# Patient Record
Sex: MALE | Race: WHITE | NOT HISPANIC OR LATINO | Employment: FULL TIME | URBAN - METROPOLITAN AREA
[De-identification: names, ages, dates, MRNs, and addresses within clinical notes are randomized per-mention and may not be internally consistent; named-entity substitution may affect disease eponyms.]

---

## 2017-01-13 ENCOUNTER — TELEPHONE (OUTPATIENT)
Dept: FAMILY MEDICINE | Facility: CLINIC | Age: 52
End: 2017-01-13

## 2017-01-13 DIAGNOSIS — R19.7 DIARRHEA, UNSPECIFIED TYPE: Primary | ICD-10-CM

## 2017-01-13 NOTE — TELEPHONE ENCOUNTER
Let's check on Monday, if the stool remains loose, then repeat test is needed.  Yasmin Rodríguez MD  [facility]  [phone]  r

## 2017-01-13 NOTE — TELEPHONE ENCOUNTER
PT calls re 12/29/16 visit for c-diff   Took last dose vanco one week ago. I do not have anywhere near pain I had in hospital but stools are still soft, is this something to be concerned about?    3 loose stools today but admits to overeating last night.  General questions about how long before stool returns to baseline?   Florentin Gardiner, RN

## 2017-01-16 ENCOUNTER — CARE COORDINATION (OUTPATIENT)
Dept: CARE COORDINATION | Facility: CLINIC | Age: 52
End: 2017-01-16

## 2017-01-16 NOTE — Clinical Note
[place] CARE COORDINATION  [address] [address]  [phone]    January 20, 2017      Jung Severino  [address] [facility] [address]    Dear Jung,  I am the Clinic Care Coordinator that works with your primary care provider's clinic. I wanted to introduce myself and provide you with my contact information for you to be able to call me with any questions or concerns. I have been trying to reach you recently to introduce Clinic Care Coordination and to see if there was anything I could assist you with.  I recently tried to call and was unable to reach you. Below is a description of what Clinic Care Coordination is and how I can further assist you.     The Clinic Care Coordinator role is a Registered Nurse and/or  who understands the health care system. The goal of Clinic Care Coordination is to help you manage your health and improve access to the [facility] system in the most efficient manner.  The Registered Nurse can assist you in meeting your health care goals by providing education, coordinating services, and strengthening the communication among your providers. The  can assist you with financial, behavioral, psychosocial, and chemical dependency and counseling/psychiatric resources.    Please feel free to keep this letter and contact information to contact me at [phone] with any further questions or concerns that may arise. We at [facility] are focused on providing you with the highest-quality healthcare experience possible and that all starts with you.     Sincerely,     Tracy Altamirano Care Coordinator RN  [facility]  [phone]    Enclosed: I have enclosed a copy of a 24 Hour Access Plan. This has helpful phone numbers for you to call when needed. Please keep this in an easy to access place to use as needed.

## 2017-01-16 NOTE — PROGRESS NOTES
Clinic Care Coordination Contact  [facility]/Voicemail    Referral Source: Care Team (triage RN )  Clinical Data: Care Coordinator Outreach C-diff / alcohol withdrawal   Outreach attempted x 1.  Left message on voicemail with call back information and requested return call.  Plan:  Care Coordinator will try to reach patient again in 1-2 business days.    Tracy Altamirano Care Coordinator RN  [facility]  [phone]

## 2017-01-16 NOTE — TELEPHONE ENCOUNTER
No problem, we can do lab again, but I will give it until Friday.  Yasmin Rodríguez MD  [facility]  [phone]

## 2017-01-16 NOTE — TELEPHONE ENCOUNTER
Pt reports.  For the first time this morning, semi-formed. They were pretty loose over the weekend.   If no improvement or stools loosen up pt prefers to call back tomorrow for lab orders/schedule LO appointment.    Dr. Rodríguez, OK for nurse to place lab order (same as 12/20/16) for c Diff Toxin B PCR  if needed?  Florentin Gardiner, RN

## 2017-01-16 NOTE — Clinical Note
Health Care Home - Access Care Plan    About Me  Patient Name:  Jung Severino    YOB: 1965  Age:                            51 year old   [place] MRN:         [id number] Telephone Information:     Home Phone [phone]   Mobile [phone]   Home Phone [phone]       Address:    [address] [place] [address] Email address:  [email or web address]      Emergency Contact(s)  Name Relationship Lgl Grd Work Phone Home Phone Mobile Phone   1. CAROLYN SEVERINO Spouse  [phone]    2. TERRY SEVERINO Daughter   [phone]              Health Maintenance:      My Access Plan  Medical Emergency 911   Questions or concerns during clinic hours Primary Clinic Line, I will call the clinic directly: Primary Clinic: [facility]- [phone]   24 Hour Appointment Line [phone] or  [phone])  (toll free)   24 Hour Nurse Line [phone] (toll free)   Questions or concerns outside clinic hours 24 Hour Appointment Line, I will call the after-hours on-call line:   [facility] [phone] or [phone] ([phone]) (toll-free)   Preferred Urgent Care Preferred Urgent Care: [facility], [phone]   Preferred Hospital Preferred Hospital: [facility]  [phone]   Preferred Pharmacy [facility] [place] [id number] [place] [address] [address]     Behavioral Health Crisis Line [facility], [phone] or 911     My Care Team Members     Relationship Specialty Notifications Yasmin Evans MD PCP - General Family Practice  3/14/16    Phone: [phone] Fax: [phone]           My Medical and Care Information  Problem List   Patient Active Problem List   Diagnosis     Family history of other cardiovascular diseases     Closed fracture of head of radius     Contusion of face, scalp, and neck except eye(s)     Injury, other and  unspecified, hand, except finger     Malaise and fatigue     Anxiety     Mild major depression (H)     CARDIOVASCULAR SCREENING; LDL GOAL LESS THAN 130     Alcohol withdrawal syndrome (H)     C. difficile colitis     Alcohol abuse        Current Medications and Allergies:  See printed Medication Report

## 2017-01-18 NOTE — PROGRESS NOTES
Clinic Care Coordination Contact  [facility]/Voicemail    Referral Source: Care Team (triage RN)  Clinical Data: Care Coordinator Outreach  Outreach attempted x 2.  Left message on voicemail with call back information and requested return call.  Plan: Care Coordinator will f/u in 2 business days with [facility] letter.    Tracy Altamirano Care Coordinator RN  [facility]  [phone]

## 2018-01-05 ENCOUNTER — TELEPHONE (OUTPATIENT)
Dept: FAMILY MEDICINE | Facility: CLINIC | Age: 53
End: 2018-01-05

## 2018-01-05 NOTE — TELEPHONE ENCOUNTER
1/5/2018    Call Regarding Preventive Health Screening Colonoscopy    Attempt 1    Message on voicemail     Comments:       Outreach   SB

## 2018-01-13 NOTE — TELEPHONE ENCOUNTER
1/13/2018    Call Regarding Preventive Health Screening Colonoscopy    Attempt 2    Message on voicemail     Comments:       Outreach   SB

## 2018-01-19 DIAGNOSIS — F51.01 PRIMARY INSOMNIA: ICD-10-CM

## 2018-01-19 NOTE — LETTER
January 23, 2018      Jung Severino  [address] [facility] [address]        Dear ,    Your provider sent a 15 day refill of trazodone to your pharmacy yesterday. This is a reminder from Dr. Rodríguez to schedule an office visit. No further refills will be approved until your next appointment.    Sincerely,    FRANCIA Lerma for Yasmin Rodríguez MD

## 2018-01-19 NOTE — TELEPHONE ENCOUNTER
"Requested Prescriptions   Pending Prescriptions Disp Refills     traZODone (DESYREL) 100 MG tablet [Pharmacy Med Name: TRAZODONE 100MG TABLETS] 90 tablet 0     Sig: TAKE 1 TABLET(100 MG) BY MOUTH EVERY NIGHT AS NEEDED FOR SLEEP    Serotonin Modulators Failed    1/19/2018 11:34 AM       Failed - Recent or future visit with authorizing provider's specialty    Patient had office visit in the last year or has a visit in the next 30 days with authorizing provider.  See \"Patient Info\" tab in inbasket, or \"Choose Columns\" in Meds & Orders section of the refill encounter.            Passed - Patient is age 18 or older        Last Written Prescription Date:  12/19/16  Last Fill Quantity: 90,  # refills: 3   Last Office Visit with G, P or [facility] prescribing provider:  12/29/16 w/Dr Rodríguez   Future Office Visit:       "

## 2018-01-23 RX ORDER — TRAZODONE HYDROCHLORIDE 100 MG/1
TABLET ORAL
Qty: 15 TABLET | Refills: 0 | Status: SHIPPED | OUTPATIENT
Start: 2018-01-23 | End: 2018-03-05

## 2018-02-16 DIAGNOSIS — F51.01 PRIMARY INSOMNIA: ICD-10-CM

## 2018-02-16 NOTE — TELEPHONE ENCOUNTER
2/16/2018    Call Regarding Preventive Health Screening Colonoscopy     Attempt 3     Message on voicemail      Comments:         Outreach ,  Wanda Herrera

## 2018-02-16 NOTE — TELEPHONE ENCOUNTER
"Requested Prescriptions   Pending Prescriptions Disp Refills     traZODone (DESYREL) 100 MG tablet [Pharmacy Med Name: TRAZODONE 100MG TABLETS]  Over a year since last office visit  Last Written Prescription Date:  1/23/2018  Last Fill Quantity: 15 tablet,  # refills: 0   Last office visit: 12/29/2016 with prescribing provider:  Marcos    15 tablet 0     Sig: TAKE 1 TABLET(100 MG) BY MOUTH EVERY NIGHT AS NEEDED FOR SLEEP    Serotonin Modulators Failed    2/16/2018 12:48 PM       Failed - Recent or future visit with authorizing provider's specialty    Patient had office visit in the last year or has a visit in the next 30 days with authorizing provider.  See \"Patient Info\" tab in inbasket, or \"Choose Columns\" in Meds & Orders section of the refill encounter.            Passed - Patient is age 18 or older             "

## 2018-02-19 RX ORDER — TRAZODONE HYDROCHLORIDE 100 MG/1
TABLET ORAL
Refills: 0
Start: 2018-02-19

## 2018-02-19 NOTE — TELEPHONE ENCOUNTER
Denied. No future appointment.    Per 1/19/18 refill encounter Dr. Rodríguez noted:        Given 15 days supply. Last refill        1/23/18 Letter was mailed.  Florentin Gardiner RN

## 2018-02-28 DIAGNOSIS — F51.01 PRIMARY INSOMNIA: ICD-10-CM

## 2018-02-28 DIAGNOSIS — L30.8 OTHER ECZEMA: ICD-10-CM

## 2018-03-01 NOTE — TELEPHONE ENCOUNTER
"Requested Prescriptions   Pending Prescriptions Disp Refills     triamcinolone (KENALOG) 0.1 % cream [Pharmacy Med Name: TRIAMCINOLONE 0.1% CREAM 80GM]  Last Written Prescription Date:  12/29/16  Last Fill Quantity: 85.2 G,  # refills: 3   Last office visit: 12/29/16 with prescribing provider:  JACKIE   Future Office Visit:     80 g 0     Sig: APPLY EXTERNALLY TO THE AFFECTED AREA TWICE DAILY    Topical Steroid Protocol Failed    2/28/2018 12:31 PM       Failed - Recent or future visit with authorizing provider's specialty    Patient had office visit in the last year or has a visit in the next 30 days with authorizing provider.  See \"Patient Info\" tab in inbasket, or \"Choose Columns\" in Meds & Orders section of the refill encounter.            Passed - Patient is age 6 or older       Passed - Authorizing prescriber's most recent note related to this medication read.       Passed - High potency steroid not ordered        traZODone (DESYREL) 100 MG tablet [Pharmacy Med Name: TRAZODONE 100MG TABLETS]  Last Written Prescription Date:  1/23/18  Last Fill Quantity: 15 TABLET,  # refills: 0   Last office visit: 12/29/16 with prescribing provider:  JACKIE   Future Office Visit:     15 tablet 0     Sig: TAKE 1 TABLET(100 MG) BY MOUTH EVERY NIGHT AS NEEDED FOR SLEEP    Serotonin Modulators Failed    2/28/2018 12:31 PM       Failed - Recent or future visit with authorizing provider's specialty    Patient had office visit in the last year or has a visit in the next 30 days with authorizing provider.  See \"Patient Info\" tab in inbasket, or \"Choose Columns\" in Meds & Orders section of the refill encounter.            Passed - Patient is age 18 or older          "

## 2018-03-02 RX ORDER — TRIAMCINOLONE ACETONIDE 1 MG/G
CREAM TOPICAL
Refills: 0
Start: 2018-03-02

## 2018-03-02 RX ORDER — TRAZODONE HYDROCHLORIDE 100 MG/1
TABLET ORAL
Refills: 0
Start: 2018-03-02

## 2018-03-02 NOTE — TELEPHONE ENCOUNTER
Denied.    Message handled by Nurse Triage with [facility] - provider name: Yasmin Rodríguez MD.  Florentin Gardiner RN

## 2018-03-05 ENCOUNTER — OFFICE VISIT (OUTPATIENT)
Dept: FAMILY MEDICINE | Facility: CLINIC | Age: 53
End: 2018-03-05
Payer: COMMERCIAL

## 2018-03-05 VITALS
HEIGHT: 68 IN | RESPIRATION RATE: 20 BRPM | OXYGEN SATURATION: 95 % | HEART RATE: 95 BPM | DIASTOLIC BLOOD PRESSURE: 129 MMHG | SYSTOLIC BLOOD PRESSURE: 214 MMHG | TEMPERATURE: 98.8 F | WEIGHT: 212 LBS | BODY MASS INDEX: 32.13 KG/M2

## 2018-03-05 DIAGNOSIS — I10 BENIGN ESSENTIAL HYPERTENSION: Primary | ICD-10-CM

## 2018-03-05 DIAGNOSIS — L30.8 OTHER ECZEMA: ICD-10-CM

## 2018-03-05 DIAGNOSIS — Z12.11 SCREEN FOR COLON CANCER: ICD-10-CM

## 2018-03-05 DIAGNOSIS — F51.01 PRIMARY INSOMNIA: ICD-10-CM

## 2018-03-05 LAB
ANION GAP SERPL CALCULATED.3IONS-SCNC: 4 MMOL/L (ref 3–14)
BUN SERPL-MCNC: 20 MG/DL (ref 7–30)
CALCIUM SERPL-MCNC: 9 MG/DL (ref 8.5–10.1)
CHLORIDE SERPL-SCNC: 103 MMOL/L (ref 94–109)
CHOLEST SERPL-MCNC: 223 MG/DL
CO2 SERPL-SCNC: 29 MMOL/L (ref 20–32)
CREAT SERPL-MCNC: 0.84 MG/DL (ref 0.66–1.25)
CREAT UR-MCNC: 118 MG/DL
ERYTHROCYTE [DISTWIDTH] IN BLOOD BY AUTOMATED COUNT: 11.9 % (ref 10–15)
GFR SERPL CREATININE-BSD FRML MDRD: >90 ML/MIN/1.7M2
GLUCOSE SERPL-MCNC: 110 MG/DL (ref 70–99)
HCT VFR BLD AUTO: 45.8 % (ref 40–53)
HDLC SERPL-MCNC: 63 MG/DL
HGB BLD-MCNC: 16.2 G/DL (ref 13.3–17.7)
LDLC SERPL CALC-MCNC: 121 MG/DL
MCH RBC QN AUTO: 34.5 PG (ref 26.5–33)
MCHC RBC AUTO-ENTMCNC: 35.4 G/DL (ref 31.5–36.5)
MCV RBC AUTO: 98 FL (ref 78–100)
MICROALBUMIN UR-MCNC: 170 MG/L
MICROALBUMIN/CREAT UR: 144.07 MG/G CR (ref 0–17)
NONHDLC SERPL-MCNC: 160 MG/DL
PLATELET # BLD AUTO: 173 10E9/L (ref 150–450)
POTASSIUM SERPL-SCNC: 3.9 MMOL/L (ref 3.4–5.3)
RBC # BLD AUTO: 4.69 10E12/L (ref 4.4–5.9)
SODIUM SERPL-SCNC: 136 MMOL/L (ref 133–144)
TRIGL SERPL-MCNC: 194 MG/DL
TSH SERPL DL<=0.005 MIU/L-ACNC: 1.83 MU/L (ref 0.4–4)
WBC # BLD AUTO: 5.3 10E9/L (ref 4–11)

## 2018-03-05 PROCEDURE — 80061 LIPID PANEL: CPT | Performed by: FAMILY MEDICINE

## 2018-03-05 PROCEDURE — 85027 COMPLETE CBC AUTOMATED: CPT | Performed by: FAMILY MEDICINE

## 2018-03-05 PROCEDURE — 36415 COLL VENOUS BLD VENIPUNCTURE: CPT | Performed by: FAMILY MEDICINE

## 2018-03-05 PROCEDURE — 82043 UR ALBUMIN QUANTITATIVE: CPT | Performed by: FAMILY MEDICINE

## 2018-03-05 PROCEDURE — 80048 BASIC METABOLIC PNL TOTAL CA: CPT | Performed by: FAMILY MEDICINE

## 2018-03-05 PROCEDURE — 84443 ASSAY THYROID STIM HORMONE: CPT | Performed by: FAMILY MEDICINE

## 2018-03-05 PROCEDURE — 99214 OFFICE O/P EST MOD 30 MIN: CPT | Performed by: FAMILY MEDICINE

## 2018-03-05 RX ORDER — TRIAMCINOLONE ACETONIDE 1 MG/G
CREAM TOPICAL 2 TIMES DAILY
Qty: 85.2 G | Refills: 3 | Status: SHIPPED | OUTPATIENT
Start: 2018-03-05 | End: 2019-03-04

## 2018-03-05 RX ORDER — TRAZODONE HYDROCHLORIDE 100 MG/1
100 TABLET ORAL AT BEDTIME
Qty: 90 TABLET | Refills: 3 | Status: SHIPPED | OUTPATIENT
Start: 2018-03-05 | End: 2019-03-04

## 2018-03-05 RX ORDER — HYDROCHLOROTHIAZIDE 25 MG/1
25 TABLET ORAL DAILY
Qty: 90 TABLET | Refills: 3 | Status: SHIPPED | OUTPATIENT
Start: 2018-03-05 | End: 2020-03-26

## 2018-03-05 ASSESSMENT — PATIENT HEALTH QUESTIONNAIRE - PHQ9: 5. POOR APPETITE OR OVEREATING: SEVERAL DAYS

## 2018-03-05 ASSESSMENT — ANXIETY QUESTIONNAIRES
GAD7 TOTAL SCORE: 1
3. WORRYING TOO MUCH ABOUT DIFFERENT THINGS: NOT AT ALL
5. BEING SO RESTLESS THAT IT IS HARD TO SIT STILL: NOT AT ALL
IF YOU CHECKED OFF ANY PROBLEMS ON THIS QUESTIONNAIRE, HOW DIFFICULT HAVE THESE PROBLEMS MADE IT FOR YOU TO DO YOUR WORK, TAKE CARE OF THINGS AT HOME, OR GET ALONG WITH OTHER PEOPLE: NOT DIFFICULT AT ALL
2. NOT BEING ABLE TO STOP OR CONTROL WORRYING: NOT AT ALL
1. FEELING NERVOUS, ANXIOUS, OR ON EDGE: NOT AT ALL
7. FEELING AFRAID AS IF SOMETHING AWFUL MIGHT HAPPEN: NOT AT ALL
6. BECOMING EASILY ANNOYED OR IRRITABLE: NOT AT ALL

## 2018-03-05 NOTE — MR AVS SNAPSHOT
After Visit Summary   3/5/2018    Jung Severino    MRN: [id number]           Patient Information     Date Of Birth          1965        Visit Information        Provider Department      3/5/2018 1:30 PM Yasmin Rodríguez MD [facility]        Today's Diagnoses     Benign essential hypertension    -  1    Screen for colon cancer        Primary insomnia        Other eczema           Follow-ups after your visit        Additional Services     GASTROENTEROLOGY ADULT REF PROCEDURE ONLY       Last Lab Result: Creatinine (mg/dL)       Date                     Value                 12/24/2016               0.98             ----------  There is no height or weight on file to calculate BMI.     Needed:  No  Language:  English    Patient will be contacted to schedule procedure.     Please be aware that coverage of these services is subject to the terms and limitations of your health insurance plan.  Call member services at your health plan with any benefit or coverage questions.  Any procedures must be performed at a [facility] facility OR coordinated by your clinic's referral office.    Please bring the following with you to your appointment:    (1) Any X-Rays, CTs or MRIs which have been performed.  Contact the facility where they were done to arrange for  prior to your scheduled appointment.    (2) List of current medications   (3) This referral request   (4) Any documents/labs given to you for this referral                  Your next 10 appointments already scheduled     Mar 06, 2018 11:00 AM CST   Nurse Only with SAVANNAH MATHEW MA/LPN   [facility]    [address] [place] [id number] [phone]              Who to contact     If you have questions or need follow up information about today's clinic visit or your schedule please contact [facility] directly at [phone].  Normal or  "non-critical lab and imaging results will be communicated to you by MyChart, letter or phone within 4 business days after the clinic has received the results. If you do not hear from us within 7 days, please contact the clinic through [employer] or phone. If you have a critical or abnormal lab result, we will notify you by phone as soon as possible.  Submit refill requests through [employer] or call your pharmacy and they will forward the refill request to us. Please allow 3 business days for your refill to be completed.          Additional Information About Your Visit        [employer] Information     [employer] gives you secure access to your electronic health record. If you see a primary care provider, you can also send messages to your care team and make appointments. If you have questions, please call your primary care clinic.  If you do not have a primary care provider, please call [phone] and they will assist you.        Care EveryWhere ID     This is your Care EveryWhere ID. This could be used by other organizations to access your [facility] medical records  [id number]        Your Vitals Were     Pulse Temperature Respirations Height Pulse Oximetry BMI (Body Mass Index)    95 98.8  F (37.1  C) (Oral) 20 5' 8\" (1.727 m) 95% 32.23 kg/m2       Blood Pressure from Last 3 Encounters:   03/05/18 (!) 214/129   12/29/16 129/88   12/24/16 130/84    Weight from Last 3 Encounters:   03/05/18 212 lb (96.2 kg)   12/29/16 183 lb (83 kg)   12/24/16 180 lb (81.6 kg)              We Performed the Following     Albumin Random Urine Quantitative with Creat Ratio     Basic metabolic panel     CBC with platelets     DEPRESSION ACTION PLAN (DAP)     GASTROENTEROLOGY ADULT REF PROCEDURE ONLY     Lipid panel reflex to direct LDL Non-fasting     TSH with free T4 reflex          Today's Medication Changes          These changes are accurate as of 3/5/18  2:29 PM.  If you have any questions, ask your nurse or doctor.               Start " taking these medicines.        Dose/Directions    hydrochlorothiazide 25 MG tablet   Commonly known as:  HYDRODIURIL   Used for:  Benign essential hypertension   Started by:  Yasmin Rodríguez MD        Dose:  25 mg   Take 1 tablet (25 mg) by mouth daily   Quantity:  90 tablet   Refills:  3         These medicines have changed or have updated prescriptions.        Dose/Directions    traZODone 100 MG tablet   Commonly known as:  DESYREL   This may have changed:  See the new instructions.   Used for:  Primary insomnia   Changed by:  Yasmin Rodríguez MD        Dose:  100 mg   Take 1 tablet (100 mg) by mouth At Bedtime   Quantity:  90 tablet   Refills:  3            Where to get your medicines      These medications were sent to [facility] [id number] - [facility] [address]  [address], [facility] [address]     Phone:  [phone]     hydrochlorothiazide 25 MG tablet    traZODone 100 MG tablet    triamcinolone 0.1 % cream                Primary Care Provider Office Phone # Fax #    Yasmin Rodríguez [phone] [address] [address] [facility] [address]        Equal Access to Services     [facility] AH: Hadii deidre ku hadasho Soomaali, waaxda luqadaha, qaybta kaalmada adeegyada, lay blank haytonon benton masterson . So [facility] [phone].    ATENCIÓN: Si habla español, tiene a swain disposición servicios gratuitos de asistencia lingüística. Llame al [phone].    We comply with applicable federal civil rights laws and [place] laws. We do not discriminate on the basis of race, color, national origin, age, disability, sex, sexual orientation, or gender identity.            Thank you!     Thank you for choosing [facility]  for your care. Our goal is always to provide you with excellent care. Hearing back from our patients is one way we can continue to improve our services. Please take a few minutes to complete the written survey that  you may receive in the mail after your visit with us. Thank you!             Your Updated Medication List - Protect others around you: Learn how to safely use, store and throw away your medicines at www.disposemymeds.org.          This list is accurate as of 3/5/18  2:29 PM.  Always use your most recent med list.                   Brand Name Dispense Instructions for use Diagnosis    ADVIL PO      Take 200 mg by mouth at onset of headache for moderate pain        hydrochlorothiazide 25 MG tablet    HYDRODIURIL    90 tablet    Take 1 tablet (25 mg) by mouth daily    Benign essential hypertension       multivitamin, therapeutic with minerals Tabs tablet     30 each    Take 1 tablet by mouth daily    Alcohol withdrawal, uncomplicated (H)       traZODone 100 MG tablet    DESYREL    90 tablet    Take 1 tablet (100 mg) by mouth At Bedtime    Primary insomnia       triamcinolone 0.1 % cream    KENALOG    85.2 g    Apply topically 2 times daily    Other eczema       ZANTAC PO      Take 150 mg by mouth every morning

## 2018-03-05 NOTE — PROGRESS NOTES
SUBJECTIVE:   Jung Severino is a 52 year old male who presents to clinic today for the following health issues:      Medication Followup of Trazodone  He is taking trazodone tu for insomnia.    Taking Medication as prescribed: yes    Side Effects:  None    Medication Helping Symptoms:  yes       Hypertension Follow-up      Outpatient blood pressures are not being checked.    Pt has been gaining weight as he has not been exercising, and he did have high blood pressure in the past when he was overweight.    Low Salt Diet: no added salt    He has been sick with URI symptoms., and he has been taking sudafed.      Problem list and histories reviewed & adjusted, as indicated.  Additional history: as documented    Patient Active Problem List   Diagnosis     Family history of other cardiovascular diseases     Closed fracture of head of radius     Contusion of face, scalp, and neck except eye(s)     Injury, other and unspecified, hand, except finger     Malaise and fatigue     Anxiety     Mild major depression (H)     CARDIOVASCULAR SCREENING; LDL GOAL LESS THAN 130     Alcohol withdrawal syndrome (H)     C. difficile colitis     Alcohol abuse     Benign essential hypertension     Past Surgical History:   Procedure Laterality Date     NOT IN USE         Social History   Substance Use Topics     Smoking status: Never Smoker     Smokeless tobacco: Current User     Types: Chew      Comment: chewing tobacco     Alcohol use No     Family History   Problem Relation Age of Onset     HEART DISEASE Father      CANCER No family hx of      DIABETES No family hx of          Current Outpatient Prescriptions   Medication Sig Dispense Refill     traZODone (DESYREL) 100 MG tablet Take 1 tablet (100 mg) by mouth At Bedtime 90 tablet 3     triamcinolone (KENALOG) 0.1 % cream Apply topically 2 times daily 85.2 g 3     hydrochlorothiazide (HYDRODIURIL) 25 MG tablet Take 1 tablet (25 mg) by mouth daily 90 tablet 3     multivitamin,  "therapeutic with minerals (THERA-VIT-M) TABS tablet Take 1 tablet by mouth daily 30 each 0     RaNITidine HCl (ZANTAC PO) Take 150 mg by mouth every morning        Ibuprofen (ADVIL PO) Take 200 mg by mouth at onset of headache for moderate pain       [DISCONTINUED] traZODone (DESYREL) 100 MG tablet TAKE 1 TABLET(100 MG) BY MOUTH EVERY NIGHT AS NEEDED FOR SLEEP 15 tablet 0     Allergies   Allergen Reactions     Sulfa Drugs      Tetanus [Tetanus Toxoids] Nausea and Vomiting     High Fever x4 days       Reviewed and updated as needed this visit by clinical staff  Tobacco  Allergies  Meds  Med Hx  Surg Hx  Fam Hx  Soc Hx      Reviewed and updated as needed this visit by Provider         ROS:  CONSTITUTIONAL: NEGATIVE for fever, chills, change in weight  ENT/MOUTH: runny nose, sore throta, and congestion with cough.  CV: NEGATIVE for chest pain, palpitations or peripheral edema    OBJECTIVE:     BP (!) 214/129 (BP Location: Right arm, Patient Position: Chair, Cuff Size: Adult Large)  Pulse 95  Temp 98.8  F (37.1  C) (Oral)  Resp 20  Ht 5' 8\" (1.727 m)  Wt 212 lb (96.2 kg)  SpO2 95%  BMI 32.23 kg/m2  Body mass index is 32.23 kg/(m^2).  Head: Normocephalic, atraumatic.  Eyes: Conjunctiva clear, non icteric. PERRLA.  Ears: External ears nl, TM is nl   Nose: Septum midline, nasal mucosa congested. No discharge.  There is no tenderness over the maxillary sinuses, there is no tenderness over the frontal sinuses  Mouth / Throat: Normal dentition.  No oral lesions. Pharynx mild erythematous, tonsils no exudate/hypertrophy.  Neck: Supple, no enlarged LN, trachea midline.  LUNGS:  CTA B/L, no wheezing or crackles.  CVS : RRR, no murmur, no rub.        Diagnostic Test Results:  Results for orders placed or performed in visit on 03/05/18 (from the past 24 hour(s))   CBC with platelets   Result Value Ref Range    WBC 5.3 4.0 - 11.0 10e9/L    RBC Count 4.69 4.4 - 5.9 10e12/L    Hemoglobin 16.2 13.3 - 17.7 g/dL    " Hematocrit 45.8 40.0 - 53.0 %    MCV 98 78 - 100 fl    MCH 34.5 (H) 26.5 - 33.0 pg    MCHC 35.4 31.5 - 36.5 g/dL    RDW 11.9 10.0 - 15.0 %    Platelet Count 173 150 - 450 10e9/L       ASSESSMENT/PLAN:             1. Screen for colon cancer  Refer to   - GASTROENTEROLOGY ADULT REF PROCEDURE ONLY    2. Primary insomnia  Refill given, controlled while on medications.  - traZODone (DESYREL) 100 MG tablet; Take 1 tablet (100 mg) by mouth At Bedtime  Dispense: 90 tablet; Refill: 3    3. Other eczema    - triamcinolone (KENALOG) 0.1 % cream; Apply topically 2 times daily  Dispense: 85.2 g; Refill: 3    4. Benign essential hypertension  This is hypertensive urgency, pt is compliant, and will follow up closely, he denies any chest pain or sever headache/dizziness, will start on HCTZ and recheck in AM tomorrow.  Meanwhile, stop sudafed and other cold medicine until then.  - hydrochlorothiazide (HYDRODIURIL) 25 MG tablet; Take 1 tablet (25 mg) by mouth daily  Dispense: 90 tablet; Refill: 3  - Basic metabolic panel  - Lipid panel reflex to direct LDL Non-fasting  - CBC with platelets  - TSH with free T4 reflex  - Albumin Random Urine Quantitative with Creat Ratio    Follow up in Am.    Yasmin Rodríguez MD  [facility]

## 2018-03-06 ENCOUNTER — TELEPHONE (OUTPATIENT)
Dept: FAMILY MEDICINE | Facility: CLINIC | Age: 53
End: 2018-03-06

## 2018-03-06 ASSESSMENT — ANXIETY QUESTIONNAIRES: GAD7 TOTAL SCORE: 1

## 2018-03-06 ASSESSMENT — PATIENT HEALTH QUESTIONNAIRE - PHQ9: SUM OF ALL RESPONSES TO PHQ QUESTIONS 1-9: 2

## 2018-03-06 NOTE — TELEPHONE ENCOUNTER
Can we call Nabeel, his Blood pressure was significantly high, and he suppose to come and do BP check today, but he did not.  I'm very upset about this, his blood pressure was hypertensive urgency and he needs close follow up.  I recommend that he comes in for BP check or head to the ER for blood pressure management if he doesn't wish to come in here.  Yasmin Rodríguez MD  [facility]  [phone]

## 2018-03-07 ENCOUNTER — NURSE TRIAGE (OUTPATIENT)
Dept: NURSING | Facility: CLINIC | Age: 53
End: 2018-03-07

## 2018-03-07 ENCOUNTER — TELEPHONE (OUTPATIENT)
Dept: FAMILY MEDICINE | Facility: CLINIC | Age: 53
End: 2018-03-07

## 2018-03-07 NOTE — TELEPHONE ENCOUNTER
No answer/ home #  Left message cell to call [facility] back ASAP  for urgent message from your doctor.   Florentin Gardiner RN

## 2018-03-08 NOTE — TELEPHONE ENCOUNTER
No problem, but if there is anyway he can get his blood pressure checked and let me know about it, maybe at a pharmacy there, I really don't want him to be walking around with such a high blood pressure.  But I'm not sure what is the best way to contact him.  Yasmin Rodríguez MD  [facility]  [phone]

## 2018-03-08 NOTE — TELEPHONE ENCOUNTER
See FNA telephone encounter. Patient was informed. Also sent pt. CMP.LYt message to check his BP while out of town.    Neyda ABDULLAHI RN, BSN, PHN  [facility] RN

## 2018-03-08 NOTE — TELEPHONE ENCOUNTER
Patient states he had an urgent message from clinic to call.  FNA informed patient of Epic note from PCP of 3/6/18.  Patient states he was not able to make appointment due to weather conditions and work related travel.  He can try to make a clinic appointment for 3/12/18, or go to an  this weekend as he will be back in town.  Requests PCP/clinic advise via [facility] as he is out of town and doesn't have cell phone.  Routed to PCP Pool.

## 2018-03-08 NOTE — TELEPHONE ENCOUNTER
Clinic Action Needed:  Please contact patient via [employer].  Reason for Call:  Patient states he had an urgent message from clinic to call.  FNA informed patient of Epic note from PCP of 3/6/18.  Patient states he was not able to make appointment due to weather conditions and work related travel.  He can try to make a clinic appointment for 3/12/18, or go to an  this weekend as he will be back in town.  Requests PCP/clinic advise via [employer] as he is out of town and doesn't have cell phone.  Routed to:  PCP Pool

## 2018-05-03 ENCOUNTER — TELEPHONE (OUTPATIENT)
Dept: FAMILY MEDICINE | Facility: CLINIC | Age: 53
End: 2018-05-03

## 2018-05-03 NOTE — LETTER
[facility]  [address] [address] [facility] [id number] [phone]  May 3, 2018    Jung Severino  [address] [address] [facility] [address]    Dear Jung,    I care about your health and have reviewed your health plan. I have reviewed your medical conditions, medication list, and lab results and am making recommendations based on this review, to better manage your health.    You are in particular need of attention regarding:  -High Blood Pressure    I am recommending that you:  {recommendations:-schedule a FOLLOWUP OFFICE APPOINTMENT with me to check your blood pressure.       Here is a list of Health Maintenance topics that are due now or due soon:  Health Maintenance Due   Topic Date Due     HIV SCREEN (SYSTEM ASSIGNED)  08/31/1981     HEPATITIS C SCREENING  08/31/1981     COLON CANCER SCREEN (SYSTEM ASSIGNED)  08/31/2013       Please call us at [phone] (or use [facility]) to address the above recommendations.     Thank you for trusting [facility] and we appreciate the opportunity to serve you.  We look forward to supporting your healthcare needs in the future.    Healthy Regards,    Yasmin Rodríguez MD

## 2018-05-03 NOTE — TELEPHONE ENCOUNTER
Panel Management Review      Patient has the following on his problem list:     Depression / Dysthymia review    Measure:  Needs PHQ-9 score of 4 or less during index window.  Administer PHQ-9 and if score is 5 or more, send encounter to provider for next steps.    5 - 7 month window range:     PHQ-9 SCORE 9/18/2013 1/30/2015 3/5/2018   Total Score 3 0 -   Total Score - - 2       If PHQ-9 recheck is 5 or more, route to provider for next steps.    Patient is due for:  None    Hypertension   Last three blood pressure readings:  BP Readings from Last 3 Encounters:   03/05/18 (!) 214/129   12/29/16 129/88   12/24/16 130/84     Blood pressure: FAILED    HTN Guidelines:  Age 18-59 BP range:  Less than 140/90  Age 60-85 with Diabetes:  Less than 140/90  Age 60-85 without Diabetes:  less than 150/90      Composite cancer screening  Chart review shows that this patient is due/due soon for the following Colonoscopy  Summary:    Patient is due/failing the following:   BP CHECK    Action needed:   Patient needs office visit with AA for BP check.    Type of outreach:    Sent letter.  Unable to leave voice mail    Questions for provider review:    None                                                                                                                                    Denise Sahu CMA       Chart routed to Care Team .

## 2018-07-19 ENCOUNTER — TELEPHONE (OUTPATIENT)
Dept: FAMILY MEDICINE | Facility: CLINIC | Age: 53
End: 2018-07-19

## 2018-07-19 NOTE — TELEPHONE ENCOUNTER
My-chart message sent    Stephanie Clemons/[facility]  [place]---[facility]

## 2018-08-13 ENCOUNTER — TELEPHONE (OUTPATIENT)
Dept: FAMILY MEDICINE | Facility: CLINIC | Age: 53
End: 2018-08-13

## 2018-08-13 NOTE — TELEPHONE ENCOUNTER
Panel Management Review      Patient has the following on his problem list:     Depression / Dysthymia review    Measure:  Needs PHQ-9 score of 4 or less during index window.  Administer PHQ-9 and if score is 5 or more, send encounter to provider for next steps.      PHQ-9 SCORE 9/18/2013 1/30/2015 3/5/2018   Total Score 3 0 -   Total Score - - 2       If PHQ-9 recheck is 5 or more, route to provider for next steps.    Patient is due for:  PHQ9    Hypertension   Last three blood pressure readings:  BP Readings from Last 3 Encounters:   03/05/18 (!) 214/129   12/29/16 129/88   12/24/16 130/84     Blood pressure: FAILED    HTN Guidelines:  Age 18-59 BP range:  Less than 140/90  Age 60-85 with Diabetes:  Less than 140/90  Age 60-85 without Diabetes:  less than 150/90      Composite cancer screening  Chart review shows that this patient is due/due soon for the following Colonoscopy  Summary:    Patient is due/failing the following:   BP CHECK, COLONOSCOPY and PHQ9    Action needed:   Patient needs office visit for depression and blood pressure, also schedule colonoscopy.    Type of outreach:    routed to panel pool for outreach    Questions for provider review:    None                                                                                                                                    [facility]       Chart routed to Care Team .

## 2019-01-04 ENCOUNTER — TELEPHONE (OUTPATIENT)
Dept: FAMILY MEDICINE | Facility: CLINIC | Age: 54
End: 2019-01-04

## 2019-01-04 NOTE — TELEPHONE ENCOUNTER
Panel Management Review      Patient has the following on his problem list:     Depression / Dysthymia review    Measure:  Needs PHQ-9 score of 4 or less during index window.  Administer PHQ-9 and if score is 5 or more, send encounter to provider for next steps.        PHQ-9 SCORE 9/18/2013 1/30/2015 3/5/2018   PHQ-9 Total Score 3 0 -   PHQ-9 Total Score - - 2       If PHQ-9 recheck is 5 or more, route to provider for next steps.    Patient is due for:  PHQ9    Hypertension   Last three blood pressure readings:  BP Readings from Last 3 Encounters:   03/05/18 (!) 214/129   12/29/16 129/88   12/24/16 130/84     Blood pressure: FAILED    HTN Guidelines:  Age 18-59 BP range:  Less than 140/90  Age 60-85 with Diabetes:  Less than 140/90  Age 60-85 without Diabetes:  less than 150/90      Composite cancer screening  Chart review shows that this patient is due/due soon for the following Colonoscopy  Summary:    Patient is due/failing the following:   BP CHECK, COLONOSCOPY and PHQ9    Action needed:   Patient needs office visit for hypertension and depression. and Patient needs referral/order: colonoscopy    Type of outreach:    routed to panel pool for outreach    Questions for provider review:    None                                                                                   Zoraida Blue     Chart routed to Care Team .

## 2019-01-04 NOTE — LETTER
[facility]  [address] [address] [id number] [phone]  January 4, 2019    Jung Severino  [address] [address] [facility] [address]    Dear Jung,    I care about your health and have reviewed your health plan. I have reviewed your medical conditions, medication list, and lab results and am making recommendations based on this review, to better manage your health.    You are in particular need of attention regarding:  -Depression  -High Blood Pressure  -Colon Cancer Screening    I am recommending that you:  {recommendations:-schedule a NURSE-ONLY BLOOD PRESSURE APPOINTMENT within the next 1-4 weeks.  -schedule a COLONOSCOPY to look for colon cancer (due every 10 years or 5 years in higher risk situations.)   Colon cancer is now the second leading cause of death in the [place] for both men and women and there are over 130,000 new cases and 50,000 deaths per year from colon cancer.  Colonoscopies can prevent 90-95% of these deaths.  Problem lesions can be removed before they ever become cancer.  This test is not only looking for cancer, but also getting rid of precancerious lesions.  If you do not wish to do a colonoscopy or cannot afford to do one, at this time, there is another option. It is called a FIT test or Fecal Immunochemical Occult Blood Test (take home stool sample kit).  It does not replace the colonoscopy for colorectal cancer screening, but it can detect hidden bleeding in the lower colon.  It does need to be repeated every year and if a positive result is obtained, you would be referred for a colonoscopy.  If you have completed either one of these tests at another facility, please have the records sent to our clinic so that we can best coordinate your care.       Here is a list of Health Maintenance topics that are due now or due soon:  Health Maintenance Due   Topic Date Due     HIV SCREEN (SYSTEM ASSIGNED)  08/31/1981      HEPATITIS C SCREENING  08/31/1981     DTAP/TDAP/TD IMMUNIZATION (1 - Tdap) 08/31/1988     COLON CANCER SCREEN (SYSTEM ASSIGNED)  08/31/2013     ZOSTER IMMUNIZATION (1 of 2) 08/31/2013     ADVANCE DIRECTIVE PLANNING Q5 YRS  08/31/2018     INFLUENZA VACCINE (1) 09/01/2018     PHQ-9 Q6 MONTHS  09/05/2018       Please call us at [phone] (or use [employer]) to address the above recommendations.     Thank you for trusting [facility] and we appreciate the opportunity to serve you.  We look forward to supporting your healthcare needs in the future.    Healthy Regards,    Yasmin Rodríguez MD ss

## 2019-03-04 ENCOUNTER — OFFICE VISIT (OUTPATIENT)
Dept: FAMILY MEDICINE | Facility: CLINIC | Age: 54
End: 2019-03-04
Payer: COMMERCIAL

## 2019-03-04 VITALS
DIASTOLIC BLOOD PRESSURE: 126 MMHG | WEIGHT: 205 LBS | TEMPERATURE: 98.6 F | RESPIRATION RATE: 20 BRPM | SYSTOLIC BLOOD PRESSURE: 170 MMHG | BODY MASS INDEX: 31.17 KG/M2 | HEART RATE: 100 BPM

## 2019-03-04 DIAGNOSIS — L30.8 OTHER ECZEMA: ICD-10-CM

## 2019-03-04 DIAGNOSIS — F51.01 PRIMARY INSOMNIA: ICD-10-CM

## 2019-03-04 DIAGNOSIS — L02.219 CELLULITIS AND ABSCESS OF TRUNK: Primary | ICD-10-CM

## 2019-03-04 DIAGNOSIS — L03.319 CELLULITIS AND ABSCESS OF TRUNK: Primary | ICD-10-CM

## 2019-03-04 PROCEDURE — 99214 OFFICE O/P EST MOD 30 MIN: CPT | Mod: 25 | Performed by: FAMILY MEDICINE

## 2019-03-04 PROCEDURE — 10060 I&D ABSCESS SIMPLE/SINGLE: CPT | Performed by: FAMILY MEDICINE

## 2019-03-04 RX ORDER — DOXYCYCLINE HYCLATE 100 MG
100 TABLET ORAL 2 TIMES DAILY
Qty: 20 TABLET | Refills: 0 | Status: SHIPPED | OUTPATIENT
Start: 2019-03-04 | End: 2019-03-14

## 2019-03-04 RX ORDER — TRAZODONE HYDROCHLORIDE 100 MG/1
100 TABLET ORAL AT BEDTIME
Qty: 90 TABLET | Refills: 3 | Status: SHIPPED | OUTPATIENT
Start: 2019-03-04 | End: 2020-03-10

## 2019-03-04 RX ORDER — SULFAMETHOXAZOLE AND TRIMETHOPRIM 400; 80 MG/1; MG/1
1 TABLET ORAL 2 TIMES DAILY
Qty: 20 TABLET | Refills: 0 | Status: CANCELLED | OUTPATIENT
Start: 2019-03-04

## 2019-03-04 RX ORDER — TRIAMCINOLONE ACETONIDE 1 MG/G
CREAM TOPICAL 2 TIMES DAILY
Qty: 85.2 G | Refills: 3 | Status: SHIPPED | OUTPATIENT
Start: 2019-03-04 | End: 2020-03-23

## 2019-03-04 SDOH — HEALTH STABILITY: MENTAL HEALTH: HOW OFTEN DO YOU HAVE 6 OR MORE DRINKS ON ONE OCCASION?: LESS THAN MONTHLY

## 2019-03-04 ASSESSMENT — ANXIETY QUESTIONNAIRES
IF YOU CHECKED OFF ANY PROBLEMS ON THIS QUESTIONNAIRE, HOW DIFFICULT HAVE THESE PROBLEMS MADE IT FOR YOU TO DO YOUR WORK, TAKE CARE OF THINGS AT HOME, OR GET ALONG WITH OTHER PEOPLE: NOT DIFFICULT AT ALL
1. FEELING NERVOUS, ANXIOUS, OR ON EDGE: SEVERAL DAYS
7. FEELING AFRAID AS IF SOMETHING AWFUL MIGHT HAPPEN: NOT AT ALL
2. NOT BEING ABLE TO STOP OR CONTROL WORRYING: NOT AT ALL
5. BEING SO RESTLESS THAT IT IS HARD TO SIT STILL: NOT AT ALL
3. WORRYING TOO MUCH ABOUT DIFFERENT THINGS: NOT AT ALL
GAD7 TOTAL SCORE: 2
6. BECOMING EASILY ANNOYED OR IRRITABLE: NOT AT ALL

## 2019-03-04 ASSESSMENT — ENCOUNTER SYMPTOMS
RESPIRATORY NEGATIVE: 1
FEVER: 0
CARDIOVASCULAR NEGATIVE: 1
ROS SKIN COMMENTS: ABSCESS

## 2019-03-04 ASSESSMENT — PATIENT HEALTH QUESTIONNAIRE - PHQ9
SUM OF ALL RESPONSES TO PHQ QUESTIONS 1-9: 1
5. POOR APPETITE OR OVEREATING: SEVERAL DAYS

## 2019-03-04 NOTE — PROGRESS NOTES
"HPI    SUBJECTIVE:   Jung Severino is a 53 year old male who presents to clinic today for the following health issues:    Concern - Mass  Onset: has been there a long time but has grown substantially in the past week    Description:   Lump in left mid back    Intensity: 8/10    Progression of Symptoms:  worsening    Accompanying Signs & Symptoms:  Hurts to lie down or if it is bumped.  Swelling, bruising with a small white spot.  Temperature of 100 over the weekend.    Previous history of similar problem:   Had one on his left cheek which was removed 15 years ago.    Precipitating factors:   Worsened by: touch    Alleviating factors:  Improved by: heating pad, Advil    Therapies Tried and outcome: As documented    Small mass on L flank for years, really not bothersome.  Seemed to get much more tender, small warner.  Hasn't tried to drain it himself.  No kalyani fever, but elevated temp.  Felt a bit ill this am, feels better now.    Notes that he had something on his face that he needed surgically removed about 15 years ago.  It never blew up like this one did.    Review of Systems   Constitutional: Positive for malaise/fatigue. Negative for fever.   Respiratory: Negative.    Cardiovascular: Negative.    Skin:        abscess         Physical Exam   Constitutional: He is oriented to person, place, and time. He appears well-developed and well-nourished.   Pulmonary/Chest:           Small umbilication on L flank and large eccentric area of induration and erythema tracking laterally/anteriorly   Neurological: He is alert and oriented to person, place, and time.   Skin: Skin is warm and dry.   Vitals reviewed.    (L03.319,  L02.219) Cellulitis and abscess of trunk  (primary encounter diagnosis)  Comment: Verbal consent obtained.  Area prepped with betadine.  Anesthesia with 1% lidocaine with epi.  Incision into abscess made with #11 blade.  Contents expressed and space thoroughly probed.  Packed with 1/4\" packing.  " Dry dressing applied.  Patient tolerated procedure well.    Concerns for MRSA, pt allergic to sulfa, unable top use Bactrim  Plan: doxycycline hyclate (VIBRA-TABS) 100 MG tablet,        DRAIN SKIN ABSCESS SIMPLE/SINGLE        Cautioned to seek emergency care if pain continues to worsen, development of constitutional sx.    (F51.01) Primary insomnia  Comment:   Plan: traZODone (DESYREL) 100 MG tablet            (L30.8) Other eczema  Comment:   Plan: triamcinolone (KENALOG) 0.1 % external cream              RTC in 2d for dressing change    Josh Salomon MD

## 2019-03-05 ASSESSMENT — ANXIETY QUESTIONNAIRES: GAD7 TOTAL SCORE: 2

## 2019-03-06 ENCOUNTER — ALLIED HEALTH/NURSE VISIT (OUTPATIENT)
Dept: NURSING | Facility: CLINIC | Age: 54
End: 2019-03-06
Payer: COMMERCIAL

## 2019-03-06 DIAGNOSIS — Z48.00 CHANGE OF DRESSING: Primary | ICD-10-CM

## 2019-03-06 PROCEDURE — 99207 ZZC NO CHARGE NURSE ONLY: CPT

## 2019-03-08 ENCOUNTER — ALLIED HEALTH/NURSE VISIT (OUTPATIENT)
Dept: NURSING | Facility: CLINIC | Age: 54
End: 2019-03-08
Payer: COMMERCIAL

## 2019-03-08 DIAGNOSIS — Z48.00 CHANGE OF DRESSING: Primary | ICD-10-CM

## 2019-03-08 DIAGNOSIS — L02.91 ABSCESS: ICD-10-CM

## 2019-03-08 PROCEDURE — 99207 ZZC NO CHARGE NURSE ONLY: CPT

## 2019-03-08 NOTE — PROGRESS NOTES
"Dressing change/packing change to abscess on the left trunk area.  Patient had abscess drained 4 days ago.  Packing was then changed 2 days ago. The dressing was removed and there was a small/moderate amount of serosanguinous drainage on the gauze.  There was still a small amount of redness and swelling. Very much reduced from 2 days ago. Patient was still very sensitive to the touch stating it was  but getting better.  Packing removed.  Again noted small amount of serosanguinous fluid with a pungent odor.    Had Dr. Salomon come and take a look at the wound healing and he stated that the wound seemed to be healing well.    Irrigated wound with sterile saline. Cleansed area around wound with Hibiclens and saline.  Dried area and re-packed with 1/4\" packing until resistance was met. Covered with gauze, telfa and tegaderm. Patient tolerated dressing change well.  Advised to follow up in 3 days.     Aicha Romero RN  "

## 2019-03-11 ENCOUNTER — ALLIED HEALTH/NURSE VISIT (OUTPATIENT)
Dept: NURSING | Facility: CLINIC | Age: 54
End: 2019-03-11
Payer: COMMERCIAL

## 2019-03-11 DIAGNOSIS — L02.91 ABSCESS: ICD-10-CM

## 2019-03-11 DIAGNOSIS — Z48.00 CHANGE OF DRESSING: Primary | ICD-10-CM

## 2019-03-11 PROCEDURE — 99207 ZZC NO CHARGE NURSE ONLY: CPT

## 2019-03-11 NOTE — PROGRESS NOTES
Dressing change/packing change to abscess on the left trunk area.  Patient had abscess drained 1 week ago. The dressing was removed and there was a small/moderate amount of serosanguinous drainage on the gauze.  There was a small amount of redness and swelling. Very much reduced from 3 days ago. Patient was not as sensitive to the touch stating it was  but getting better.  Packing removed.  Again noted small amount of serosanguinous fluid with a pungent odor.     Irrigated wound with sterile saline. Cleansed area around wound with Hibiclens and saline.  Dried area and covered with a pressure bandage of gauze, telfa and tegaderm. Patient tolerated dressing change well.  Advised patient to continue to monitor for increased swelling, redness and drainage.  Remove dressing and reapply new dressing in 3-5 days or as needed.  To follow up if wound does not seem to be healing or having any difficulties.  Patient will be traveling this week.     Aicha Romero RN

## 2019-04-23 ENCOUNTER — TELEPHONE (OUTPATIENT)
Dept: FAMILY MEDICINE | Facility: CLINIC | Age: 54
End: 2019-04-23

## 2019-04-23 NOTE — TELEPHONE ENCOUNTER
Panel Management Review      Patient has the following on his problem list:     Hypertension   Last three blood pressure readings:  BP Readings from Last 3 Encounters:   03/04/19 (!) 170/126   03/05/18 (!) 214/129   12/29/16 129/88     Blood pressure: FAILED    HTN Guidelines:  Less than 140/90      Composite cancer screening  Chart review shows that this patient is due/due soon for the following Colonoscopy  Summary:    Patient is due/failing the following:   COLONOSCOPY and PHYSICAL    Action needed:   Patient needs office visit for physical and colonoscopy.    Type of outreach:    Phone, left message for patient to call back.     Questions for provider review:    None                                                                                                                                    Lynn Rosario CMA (AAMA)    Chart routed to Care Team.

## 2019-04-23 NOTE — LETTER
[address], [address]  [facility] [address]  [phone]  April 25, 2019    Jung Severino  [address] [address] [address]      Dear Jung,    I care about your health and have reviewed your health plan.  I have reviewed your medical conditions, medication list, and lab results and am making recommendations  based on this review, to better manage your health.    You are particularly in need of attention regarding:  -Colon Cancer Screening and -Wellness (Physical) Visit    I am recommending that you:  -schedule a WELLNESS (Physical) APPOINTMENT with me.   (If you go elsewhere for Wellness visits then please disregard this reminder.)    Here is a list of Health Maintenance topics that are due now or due soon:  Health Maintenance Due   Topic Date Due     PREVENTIVE CARE VISIT  1965     HIV SCREEN (SYSTEM ASSIGNED)  08/31/1981     HEPATITIS C SCREENING  08/31/1981     DTAP/TDAP/TD IMMUNIZATION (1 - Tdap) 08/31/1988     COLON CANCER SCREEN (SYSTEM ASSIGNED)  08/31/2013     ZOSTER IMMUNIZATION (1 of 2) 08/31/2013     ADVANCE DIRECTIVE PLANNING Q5 YRS  08/31/2018     INFLUENZA VACCINE (1) 09/01/2018     Please call us at [phone] (or use [employer]) to address the above   recommendations.    Thank you for trusting [facility] and we appreciate the opportunity to serve you.  We look forward to supporting your healthcare needs in the future.    Healthy Regards,    Josh Salomon MD/polina

## 2019-09-27 ENCOUNTER — HEALTH MAINTENANCE LETTER (OUTPATIENT)
Age: 54
End: 2019-09-27

## 2020-03-07 DIAGNOSIS — F51.01 PRIMARY INSOMNIA: ICD-10-CM

## 2020-03-10 RX ORDER — TRAZODONE HYDROCHLORIDE 100 MG/1
TABLET ORAL
Qty: 90 TABLET | Refills: 0 | Status: SHIPPED | OUTPATIENT
Start: 2020-03-10 | End: 2020-03-26

## 2020-03-10 NOTE — TELEPHONE ENCOUNTER
Medication is being filled for 1 time refill only due to:  Patient needs to be seen because it has been more than one year since last visit.      mychart sent and pharmacy note sent  Annetta Lester RN, BSN

## 2020-03-20 ENCOUNTER — DOCUMENTATION ONLY (OUTPATIENT)
Dept: FAMILY MEDICINE | Facility: CLINIC | Age: 55
End: 2020-03-20

## 2020-03-24 ENCOUNTER — APPOINTMENT (OUTPATIENT)
Dept: CT IMAGING | Facility: CLINIC | Age: 55
End: 2020-03-24
Attending: EMERGENCY MEDICINE
Payer: COMMERCIAL

## 2020-03-24 ENCOUNTER — HOSPITAL ENCOUNTER (EMERGENCY)
Facility: CLINIC | Age: 55
Discharge: HOME | End: 2020-03-24
Attending: EMERGENCY MEDICINE | Admitting: EMERGENCY MEDICINE
Payer: COMMERCIAL

## 2020-03-24 VITALS
BODY MASS INDEX: 29.67 KG/M2 | SYSTOLIC BLOOD PRESSURE: 153 MMHG | DIASTOLIC BLOOD PRESSURE: 117 MMHG | WEIGHT: 195.77 LBS | TEMPERATURE: 97.9 F | HEART RATE: 117 BPM | OXYGEN SATURATION: 96 % | RESPIRATION RATE: 20 BRPM | HEIGHT: 68 IN

## 2020-03-24 DIAGNOSIS — E83.42 HYPOMAGNESEMIA: ICD-10-CM

## 2020-03-24 DIAGNOSIS — S30.1XXA TRAUMATIC ECCHYMOSIS OF FLANK, INITIAL ENCOUNTER: ICD-10-CM

## 2020-03-24 DIAGNOSIS — S30.0XXA TRAUMATIC HEMATOMA OF LOWER BACK, INITIAL ENCOUNTER: ICD-10-CM

## 2020-03-24 DIAGNOSIS — E87.6 HYPOKALEMIA: ICD-10-CM

## 2020-03-24 DIAGNOSIS — F10.930 ALCOHOL WITHDRAWAL, UNCOMPLICATED (H): ICD-10-CM

## 2020-03-24 LAB
ALBUMIN SERPL-MCNC: 4 G/DL (ref 3.4–5)
ALBUMIN UR-MCNC: 20 MG/DL
ALP SERPL-CCNC: 99 U/L (ref 40–150)
ALT SERPL W P-5'-P-CCNC: 45 U/L (ref 0–70)
ANION GAP SERPL CALCULATED.3IONS-SCNC: 8 MMOL/L (ref 3–14)
APPEARANCE UR: CLEAR
APTT PPP: 27 SEC (ref 22–37)
AST SERPL W P-5'-P-CCNC: 98 U/L (ref 0–45)
BACTERIA #/AREA URNS HPF: ABNORMAL /HPF
BASOPHILS # BLD AUTO: 0.1 10E9/L (ref 0–0.2)
BASOPHILS NFR BLD AUTO: 0.5 %
BILIRUB SERPL-MCNC: 1 MG/DL (ref 0.2–1.3)
BILIRUB UR QL STRIP: NEGATIVE
BUN SERPL-MCNC: 18 MG/DL (ref 7–30)
CALCIUM SERPL-MCNC: 9.6 MG/DL (ref 8.5–10.1)
CHLORIDE SERPL-SCNC: 104 MMOL/L (ref 94–109)
CO2 SERPL-SCNC: 26 MMOL/L (ref 20–32)
COLOR UR AUTO: ABNORMAL
CREAT BLD-MCNC: 1.1 MG/DL (ref 0.66–1.25)
CREAT SERPL-MCNC: 0.97 MG/DL (ref 0.66–1.25)
DIFFERENTIAL METHOD BLD: ABNORMAL
EOSINOPHIL # BLD AUTO: 0 10E9/L (ref 0–0.7)
EOSINOPHIL NFR BLD AUTO: 0.1 %
ERYTHROCYTE [DISTWIDTH] IN BLOOD BY AUTOMATED COUNT: 11.8 % (ref 10–15)
ETHANOL SERPL-MCNC: 0.02 G/DL
GFR SERPL CREATININE-BSD FRML MDRD: 69 ML/MIN/{1.73_M2}
GFR SERPL CREATININE-BSD FRML MDRD: 86 ML/MIN/{1.73_M2}
GLUCOSE SERPL-MCNC: 145 MG/DL (ref 70–99)
GLUCOSE UR STRIP-MCNC: NEGATIVE MG/DL
HCT VFR BLD AUTO: 41.1 % (ref 40–53)
HGB BLD-MCNC: 14.1 G/DL (ref 13.3–17.7)
HGB UR QL STRIP: ABNORMAL
IMM GRANULOCYTES # BLD: 0.1 10E9/L (ref 0–0.4)
IMM GRANULOCYTES NFR BLD: 0.5 %
INR PPP: 1 (ref 0.86–1.14)
KETONES UR STRIP-MCNC: NEGATIVE MG/DL
LEUKOCYTE ESTERASE UR QL STRIP: NEGATIVE
LYMPHOCYTES # BLD AUTO: 0.7 10E9/L (ref 0.8–5.3)
LYMPHOCYTES NFR BLD AUTO: 7.7 %
MAGNESIUM SERPL-MCNC: 1.3 MG/DL (ref 1.6–2.3)
MCH RBC QN AUTO: 35.5 PG (ref 26.5–33)
MCHC RBC AUTO-ENTMCNC: 34.3 G/DL (ref 31.5–36.5)
MCV RBC AUTO: 104 FL (ref 78–100)
MONOCYTES # BLD AUTO: 0.6 10E9/L (ref 0–1.3)
MONOCYTES NFR BLD AUTO: 6.8 %
MUCOUS THREADS #/AREA URNS LPF: PRESENT /LPF
NEUTROPHILS # BLD AUTO: 7.7 10E9/L (ref 1.6–8.3)
NEUTROPHILS NFR BLD AUTO: 84.4 %
NITRATE UR QL: NEGATIVE
NRBC # BLD AUTO: 0 10*3/UL
NRBC BLD AUTO-RTO: 0 /100
PH UR STRIP: 7 PH (ref 5–7)
PLATELET # BLD AUTO: 137 10E9/L (ref 150–450)
POTASSIUM SERPL-SCNC: 3.2 MMOL/L (ref 3.4–5.3)
PROT SERPL-MCNC: 8.9 G/DL (ref 6.8–8.8)
RBC # BLD AUTO: 3.97 10E12/L (ref 4.4–5.9)
RBC #/AREA URNS AUTO: 1 /HPF (ref 0–2)
SODIUM SERPL-SCNC: 138 MMOL/L (ref 133–144)
SOURCE: ABNORMAL
SP GR UR STRIP: 1 (ref 1–1.03)
SQUAMOUS #/AREA URNS AUTO: <1 /HPF (ref 0–1)
TRANS CELLS #/AREA URNS HPF: 2 /HPF (ref 0–1)
UROBILINOGEN UR STRIP-MCNC: NORMAL MG/DL (ref 0–2)
WBC # BLD AUTO: 9.2 10E9/L (ref 4–11)
WBC #/AREA URNS AUTO: 6 /HPF (ref 0–5)

## 2020-03-24 PROCEDURE — 83735 ASSAY OF MAGNESIUM: CPT | Performed by: EMERGENCY MEDICINE

## 2020-03-24 PROCEDURE — 25000128 H RX IP 250 OP 636: Performed by: EMERGENCY MEDICINE

## 2020-03-24 PROCEDURE — 96361 HYDRATE IV INFUSION ADD-ON: CPT

## 2020-03-24 PROCEDURE — 74177 CT ABD & PELVIS W/CONTRAST: CPT

## 2020-03-24 PROCEDURE — 82565 ASSAY OF CREATININE: CPT

## 2020-03-24 PROCEDURE — 25000132 ZZH RX MED GY IP 250 OP 250 PS 637: Performed by: EMERGENCY MEDICINE

## 2020-03-24 PROCEDURE — 96375 TX/PRO/DX INJ NEW DRUG ADDON: CPT

## 2020-03-24 PROCEDURE — 99285 EMERGENCY DEPT VISIT HI MDM: CPT | Mod: 25

## 2020-03-24 PROCEDURE — 25000125 ZZHC RX 250: Performed by: EMERGENCY MEDICINE

## 2020-03-24 PROCEDURE — 80053 COMPREHEN METABOLIC PANEL: CPT | Performed by: EMERGENCY MEDICINE

## 2020-03-24 PROCEDURE — 80320 DRUG SCREEN QUANTALCOHOLS: CPT | Performed by: EMERGENCY MEDICINE

## 2020-03-24 PROCEDURE — 96366 THER/PROPH/DIAG IV INF ADDON: CPT

## 2020-03-24 PROCEDURE — 85730 THROMBOPLASTIN TIME PARTIAL: CPT | Performed by: EMERGENCY MEDICINE

## 2020-03-24 PROCEDURE — 81001 URINALYSIS AUTO W/SCOPE: CPT | Performed by: EMERGENCY MEDICINE

## 2020-03-24 PROCEDURE — 85025 COMPLETE CBC W/AUTO DIFF WBC: CPT | Performed by: EMERGENCY MEDICINE

## 2020-03-24 PROCEDURE — 93005 ELECTROCARDIOGRAM TRACING: CPT

## 2020-03-24 PROCEDURE — 96376 TX/PRO/DX INJ SAME DRUG ADON: CPT

## 2020-03-24 PROCEDURE — 96365 THER/PROPH/DIAG IV INF INIT: CPT | Mod: 59

## 2020-03-24 PROCEDURE — 25800030 ZZH RX IP 258 OP 636: Performed by: EMERGENCY MEDICINE

## 2020-03-24 PROCEDURE — 85610 PROTHROMBIN TIME: CPT | Performed by: EMERGENCY MEDICINE

## 2020-03-24 RX ORDER — DIAZEPAM 5 MG
5 TABLET ORAL EVERY 6 HOURS PRN
Qty: 12 TABLET | Refills: 0 | Status: SHIPPED | OUTPATIENT
Start: 2020-03-24 | End: 2020-03-26

## 2020-03-24 RX ORDER — LORAZEPAM 2 MG/ML
1 INJECTION INTRAMUSCULAR
Status: COMPLETED | OUTPATIENT
Start: 2020-03-24 | End: 2020-03-24

## 2020-03-24 RX ORDER — POTASSIUM CHLORIDE 1500 MG/1
40 TABLET, EXTENDED RELEASE ORAL ONCE
Status: COMPLETED | OUTPATIENT
Start: 2020-03-24 | End: 2020-03-24

## 2020-03-24 RX ORDER — ONDANSETRON 4 MG/1
4 TABLET, ORALLY DISINTEGRATING ORAL EVERY 8 HOURS PRN
Qty: 10 TABLET | Refills: 0 | Status: SHIPPED | OUTPATIENT
Start: 2020-03-24 | End: 2020-03-26

## 2020-03-24 RX ORDER — LORAZEPAM 2 MG/ML
1 INJECTION INTRAMUSCULAR ONCE
Status: COMPLETED | OUTPATIENT
Start: 2020-03-24 | End: 2020-03-24

## 2020-03-24 RX ORDER — IOPAMIDOL 755 MG/ML
500 INJECTION, SOLUTION INTRAVASCULAR ONCE
Status: COMPLETED | OUTPATIENT
Start: 2020-03-24 | End: 2020-03-24

## 2020-03-24 RX ORDER — METRONIDAZOLE 500 MG/1
500 TABLET ORAL 3 TIMES DAILY
Qty: 14 TABLET | Refills: 1 | Status: SHIPPED | OUTPATIENT
Start: 2020-03-24 | End: 2020-04-02

## 2020-03-24 RX ORDER — MAGNESIUM SULFATE HEPTAHYDRATE 40 MG/ML
2 INJECTION, SOLUTION INTRAVENOUS ONCE
Status: COMPLETED | OUTPATIENT
Start: 2020-03-24 | End: 2020-03-24

## 2020-03-24 RX ORDER — CIPROFLOXACIN 500 MG/1
500 TABLET, FILM COATED ORAL 2 TIMES DAILY
Qty: 14 TABLET | Refills: 0 | Status: SHIPPED | OUTPATIENT
Start: 2020-03-24 | End: 2020-04-02

## 2020-03-24 RX ADMIN — LORAZEPAM 1 MG: 2 INJECTION INTRAMUSCULAR; INTRAVENOUS at 22:07

## 2020-03-24 RX ADMIN — LORAZEPAM 1 MG: 2 INJECTION INTRAMUSCULAR; INTRAVENOUS at 20:24

## 2020-03-24 RX ADMIN — POTASSIUM CHLORIDE 40 MEQ: 1500 TABLET, EXTENDED RELEASE ORAL at 22:08

## 2020-03-24 RX ADMIN — MAGNESIUM SULFATE 2 G: 2 INJECTION INTRAVENOUS at 21:02

## 2020-03-24 RX ADMIN — IOPAMIDOL 98 ML: 755 INJECTION, SOLUTION INTRAVENOUS at 20:38

## 2020-03-24 RX ADMIN — LORAZEPAM 1 MG: 2 INJECTION INTRAMUSCULAR; INTRAVENOUS at 21:12

## 2020-03-24 RX ADMIN — MAGNESIUM SULFATE 2 G: 2 INJECTION INTRAVENOUS at 22:07

## 2020-03-24 RX ADMIN — SODIUM CHLORIDE 64 ML: 9 INJECTION, SOLUTION INTRAVENOUS at 20:39

## 2020-03-24 RX ADMIN — SODIUM CHLORIDE 1000 ML: 9 INJECTION, SOLUTION INTRAVENOUS at 20:22

## 2020-03-24 ASSESSMENT — ENCOUNTER SYMPTOMS
FEVER: 0
CHILLS: 0
WOUND: 0
VOMITING: 0
DIFFICULTY URINATING: 0
COUGH: 0
NAUSEA: 1
BLOOD IN STOOL: 0
SHORTNESS OF BREATH: 0
ABDOMINAL PAIN: 0
NECK PAIN: 0
ANAL BLEEDING: 0
FACIAL ASYMMETRY: 0
COLOR CHANGE: 1
TREMORS: 1
HEMATURIA: 0
DIARRHEA: 1
BACK PAIN: 1
HEADACHES: 0

## 2020-03-24 ASSESSMENT — MIFFLIN-ST. JEOR: SCORE: 1692.5

## 2020-03-24 NOTE — ED AVS SNAPSHOT
[facility] Emergency Department  [address]  [facility] [address]  Phone:  [phone]  Fax:  [phone]                                    Jung Severino   MRN: [id number]    Department:  [facility] Emergency Department   Date of Visit:  3/24/2020           After Visit Summary Signature Page    I have received my discharge instructions, and my questions have been answered. I have discussed any challenges I see with this plan with the nurse or doctor.    ..........................................................................................................................................  Patient/Patient Representative Signature      ..........................................................................................................................................  Patient Representative Print Name and Relationship to Patient    ..................................................               ................................................  Date                                   Time    ..........................................................................................................................................  Reviewed by Signature/Title    ...................................................              ..............................................  Date                                               Time          22EPIC Rev 08/18

## 2020-03-25 LAB — INTERPRETATION ECG - MUSE: NORMAL

## 2020-03-25 NOTE — DISCHARGE INSTRUCTIONS
Discharge Instructions  Diverticulitis (possible)  There are findings on your CT scan of possible mild diverticulitis. You have no localized pain in that area of your abdomen, but you could be early in the process. Start antibiotics if you develop pain in the left lower abdomen. The antibiotics, specifically metronidazole, can give you a very unpleasant reaction if taken with alcohol, so avoid alcohol. Diverticulitis is an infection of a diverticulum, which is a tiny sack-like structure that protrudes off the wall of the colon (large intestine).  These sacks are created over years of increased pressure in the colon (this is diverticulosis), usually as a result of a diet without enough fruits, vegetables, and whole grains. Because these sacks are small, bacteria can get trapped inside them and cause an infection (this is divericulitis).  This infection often causes abdominal (belly) pain, fever, nausea (sick to stomach), and vomiting (throwing up). Diverticulitis is usually treated at home.  However, sometimes diverticulitis needs treatment in the hospital and may even need surgery.    Generally, every Emergency Department visit should have a follow-up clinic visit with either a primary or a specialty clinic/provider. Please follow-up as instructed by your emergency provider today.    Return to the Emergency Department if:   You get an oral temperature above 102oF or as directed by your provider.  You have blood in your stools (bright red or black, tarry stools), or have blood in your vomit.  You keep vomiting or cannot drink liquids or cannot keep your medicine down.  You cannot have a bowel movement or you cannot pass gas.  Your stomach gets bloated or bigger.  You faint or become very weak.  Your pain is too bad to tolerate.  You have new symptoms or anything that worries you.    What can I do to help myself?  Fill any antibiotic prescriptions the provider gave you and take them right away. Be sure to finish the  "whole antibiotic prescription.  For the first day or two at home drink only clear liquids.  This lets your intestines rest.  If your pain has improved after one or two days, you may start eating mild foods. Soda crackers, toast, plain noodles, gelatin, applesauce and bananas are good first choices.  Avoid foods that have acid, are spicy, fatty or fibrous (such as meats, coarse grains, vegetables). You may start eating these foods again in about 3-4 days when you are better.  Once you are back to normal, eat a high fiber diet of fruits, vegetables and whole grains. Some people think you should avoid eating nuts, seeds, and corn, but there is no definite proof this makes any difference in whether you will get diverticulitis again.   Pain and fever can be treated with Tylenol  (acetaminophen) or you might have been prescribed a pain medication.    Probiotics: If you have been given an antibiotic, you may want to also take a probiotic pill or eat yogurt with live cultures. Probiotics have \"good bacteria\" to help your intestines stay healthy. Studies have shown that probiotics help prevent diarrhea and other intestine problems (including C. diff infection) when you take antibiotics. You can buy these without a prescription in the pharmacy section of the store.  If you were given a prescription for medicine here today, be sure to read all of the information (including the package insert) that comes with your prescription.  This will include important information about the medicine, its side effects, and any warnings that you need to know about.  The pharmacist who fills the prescription can provide more information and answer questions you may have about the medicine.  If you have questions or concerns that the pharmacist cannot address, please call or return to the Emergency Department.     Remember that you can always come back to the Emergency Department if you are not able to see your regular provider in the amount of " time listed above, if you get any new symptoms, or if there is anything that worries you.

## 2020-03-25 NOTE — ED TRIAGE NOTES
Fall on Sunday night while going down the wooden steps. Feel down about 4 steps of stairs. Bruising noted to left lower back and left upper arm. Denies any head injury or LOC. Denies cervical tenderness. Per patient, put a hot pack onto his back and now has some blistering to skin. ABCs intact.

## 2020-03-25 NOTE — ED PROVIDER NOTES
".  History     Chief Complaint:  Fall    HPI   Jung Severino is a 54 year old, not anticoagulated male with a history of alcohol abuse and hypertension who presents for evaluation of left sided back pain and bruising after a fall. Two night ago, the patient was running down hardwood stairs in socks when he slipped and fell on his tailbone before sliding down approximately four stairs. He did not hit his head or neck upon impact, nor did he lose consciousness. After the fall, he was able to ambulate and reports only minimal bruising so he did not immediately present for evaluation. Since then, he has had intermittent nausea and pain to the left side, but still nothing significant. This morning, he noted that the bruise on his back nearly \"doubled in size.\" He took 800 mg Advil this morning, and 600 mg this afternoon for the pain. He has also been using cold and hot packs for the pain which has caused some skin changes as well. He also has some left rib pain, but denies any chest pain, dyspnea, abdominal pain, vomiting, hematuria, or difficulty urinating. He had a couple loose stools this morning, however they were non-bloody and somewhat formed. Secondary to the fall, he reports concern for possible alcohol withdrawal as he is a daily drinker, but has not drunk in about 24 hours. He has a history of withdrawal, but no significant complications including seizures, DTs, or otherwise. He successfully withdrew at home with Ativan in 2008 after a treatment and was sober for a period. He was also sober for a period after a hospitalization four years ago for C. Difficile. He denies any recent hospitalizations or antibiotic use.     Allergies:  Sulfa Drugs  Tetanus [Tetanus Toxoids]    Medications:    Zantac  Trazodone    Past Medical History:    Alcohol abuse  Anxiety   Hypertension  History of C. Difficile colitis     Past Surgical History:    The patient does not have any pertinent past surgical history.    Family " "History:    Heart disease    Social History:  Marital Status:   [2]  Presents with a family member.   Positive for chewing tobacco use.   Negative for smoking.   Positive for alcohol use.   Negative for drug use.      Review of Systems   Constitutional: Negative for chills and fever.   Respiratory: Negative for cough and shortness of breath.    Cardiovascular: Positive for chest pain (left rib). Negative for leg swelling.   Gastrointestinal: Positive for diarrhea and nausea. Negative for abdominal pain, anal bleeding, blood in stool and vomiting.   Genitourinary: Negative for difficulty urinating and hematuria.   Musculoskeletal: Positive for back pain (left). Negative for neck pain.   Skin: Positive for color change (bruising left back). Negative for wound.   Neurological: Positive for tremors. Negative for syncope, facial asymmetry and headaches.   All other systems reviewed and are negative.      Physical Exam     Patient Vitals for the past 24 hrs:   BP Temp Temp src Pulse Heart Rate Resp SpO2 Height Weight   03/24/20 2245 (!) 153/117 -- -- 117 116 20 96 % -- --   03/24/20 2230 (!) 173/106 -- -- 115 115 14 96 % -- --   03/24/20 2215 (!) 167/120 -- -- 117 115 25 97 % -- --   03/24/20 2145 (!) 158/101 -- -- 109 113 15 94 % -- --   03/24/20 2130 (!) 153/104 -- -- 106 110 14 -- -- --   03/24/20 2115 (!) 150/120 -- -- 113 111 20 95 % -- --   03/24/20 2045 (!) 178/115 -- -- -- -- -- -- -- --   03/24/20 2030 (!) 154/139 -- -- 115 111 13 97 % -- --   03/24/20 2015 (!) 158/119 -- -- 114 124 11 96 % -- --   03/24/20 2000 (!) 191/134 -- -- 138 149 17 -- -- --   03/24/20 1954 (!) 170/129 97.9  F (36.6  C) Oral 140 140 18 99 % 1.727 m (5' 8\") 88.8 kg (195 lb 12.3 oz)      Physical Exam  General: Adult male laying on the stretcher  Eyes: PERRL, Conjunctive within normal limits. No scleral icterus.  ENT: Moist mucous membranes, oropharynx clear.   CV: Normal S1S2, no murmur, rub or gallop. Tachycardic, regular.  Resp: " Clear to auscultation bilaterally, no wheezes, rales or rhonchi. Normal respiratory effort.  GI: Abdomen is soft, nontender and nondistended. No palpable masses. No rebound or guarding.  MSK: Significant tenderness over the left flank and back with palpable fullness. No extremity edema, nontender. Normal active range of motion of all 4 extremities..  Skin: Warm and dry. Diffuse ecchymosis over the left flank and back. There are a couple of clear small blisters overlying the ecchymotic region.   Neuro: Alert and oriented. Mild tremors. Responds appropriately to all questions and commands. No focal findings appreciated. Normal muscle tone.  Psych: Normal mood and affect. Pleasant.    Emergency Department Course   ECG:  Indication: Alcohol Withdrawal  Time: 2010  Vent. Rate 121 bpm. DE interval 138. QRS duration 86. QT/QTc 318/451. P-R-T axis 60 49 44   Sinus tachycardia  Nonspecific ST abnormality  Abnormal ECG. Read time: 2012.    Imaging:  Radiographic findings were communicated with the patient who voiced understanding of the findings.  CT Chest/Abdomen/Pelvis w/ IV contrast:   1.  In the left flank and posterior aspect of the left back to the left of midline dorsal to the paraspinous musculature there is diffuse high density fluid most compatible with blood products related to diffuse ecchymosis with a more focal subcutaneous   hematoma along the left paraspinal musculature dorsally in the subcutaneous tissues measuring 9.2 x 3.7 x 9.1 cm.   2.  No evidence for underlying acute fracture or dislocation. Old healed posterior left 10th rib fracture. AVN right femoral head without evidence for articular surface collapse.   3.  No evidence for acute thoracic or abdominal aortic injury. No free fluid in the pelvis.   4.  No evidence for pneumothorax or pulmonary contusion. 4 mm mean diameter pulmonary nodule left major fissure. Continued follow-up recommended in 12 months.   5.  Hepatomegaly with diffuse fatty  infiltration of the liver.   6.  No evidence for solid organ injury in the upper abdomen, as per radiology.    Laboratory:  CBC: WBC: 9.2, HGB: 14.1, PLT: 137 (L)  CMP: Glucose 145 (H), K: 3.2 (L), Protein total: 8.9 (H), AST: 98 (H), o/w WNL (Creatinine: 0.97)  Alcohol ethyl: 0.02 (H)  Magnesium: 1.3 (L)  INR: 1.300  PTT: 27    Creatinine POCT: 1.1     UA with Microscopic: Blood: Trace (A), Albumin: 20 (A), WBC: 6 (H), Bacteria: Few (A), Transitional epithelial: 2 (H), Mucous: Present (A), o/w WNL    Procedures:  None    Interventions:  2022 NS 1L IV  2024 Ativan, 1 mg, IV injection  2102 MgS, 2 g, IV infusion   2112 Ativan, 1 mg, IV injection  2207 Ativan, 1 mg, IV injection   MgS, 2 g, IV infusion  2208 Klor-Con, 40 mEq, PO    Emergency Department Course:  Nursing notes and vitals reviewed.   2010: I performed an exam of the patient as documented above.     EKG obtained in the ED, see results above.     IV was inserted and blood was drawn for laboratory testing, results above. Medicine administered as documented above.   The patient was sent for a CT chest/abdomen/pelvis while in the emergency department, results above.      2154: I rechecked the patient and discussed the results of his workup thus far. He notes he is feeling improved. I discussed the results of the CT scan. He denies any recent or current abdominal pain.  I offered admission for further observation and monitoring and pain control due to the hematoma and pain in his flank/back, however he declines at this point.     The patient provided a urine sample here in the emergency department. This was sent for laboratory testing, findings above.      Findings and plan explained to the Patient. Patient discharged home with instructions regarding supportive care, medications, and reasons to return. The importance of close follow-up was reviewed. The patient was prescribed Valium, Zofran, Cipro, and Flagyl.    I personally reviewed the laboratory and  imaging results with the Patient and answered all related questions prior to discharge.    Impression & Plan      Medical Decision Making:  Jung Severino is a 54 year old male with a history of alcoholism who presents to the emergency department with concern for left back and side pain after a fall two days ago down stairs. He notes he stopped drinking yesterday and is having mild withdrawal symptoms as well. He reports coming to the emergency department today as the area of bruising seemed to be enlarged over the last 24 hours. He denies any new trauma. He has no difficulty breathing or chest pain. Here in the emergency department, he is tachycardic and hypertensive on arrival likely secondary to his alcohol withdrawal. He is not anemic. He has evidence of a large flank hematoma and ecchymoses which is confirmed on CT scan without evidence of acute fractures or solid organ injuries. There is also incidental evidence of perhaps mild diverticulitis, although he does not have any focal tenderness in the left abdomen. Ultimately, his alcohol withdrawal symptoms were improved with Ativan. Given a multitude of symptoms in the setting of alcohol withdrawal and hematoma with need for pain control, he was offered admission however he preferred to be at home. I believe he has a muscle spasm related to trauma as well and that valium may be effective for both his spasm and alcohol withdrawal. He lives at home with his wife and appears to be of sound judgement and mind. He was offered detox but declined. He has had periods of sobriety before and a history of withdrawal symptoms with successful treatment at home. He has never had alcohol withdrawal seizures or DTs. Given his overall significant improvement, I feel comfortable discharging him home per his wishes. He can return at any time if symptoms worsen or he changes his mind. Symptomatic prescriptions were given including Valium, Zofran, and metronidazole and Cipro to  take if he should develop left lower quadrant abdominal pain. He is aware that metronidazole will cause significant side effects when used with alcohol and thus he should avoid the coincidental use. All questions answered prior to discharge.     Diagnosis:    ICD-10-CM    1. Traumatic hematoma of lower back, initial encounter  S30.0XXA UA with Microscopic   2. Traumatic ecchymosis of flank, initial encounter  S30.1XXA    3. Alcohol withdrawal, uncomplicated (H)  F10.230    4. Hypomagnesemia  E83.42    5. Hypokalemia  E87.6        Disposition:  discharged to home    Discharge Medications:  Discharge Medication List as of 3/24/2020 11:11 PM      START taking these medications    Details   ciprofloxacin (CIPRO) 500 MG tablet Take 1 tablet (500 mg) by mouth 2 times daily for 7 days, Disp-14 tablet,R-0, Local Print      diazepam (VALIUM) 5 MG tablet Take 1 tablet (5 mg) by mouth every 6 hours as needed for muscle spasms or withdrawal, Disp-12 tablet,R-0, Local Print      metroNIDAZOLE (FLAGYL) 500 MG tablet Take 1 tablet (500 mg) by mouth 3 times daily for 7 days, Disp-14 tablet,R-1, Local PrintEat yogurt or cottage cheese daily to prevent diarrhea that can be caused by taking this medication.      ondansetron (ZOFRAN ODT) 4 MG ODT tab Take 1 tablet (4 mg) by mouth every 8 hours as needed for nausea or vomiting, Disp-10 tablet,R-0, Local Print             Scribe Disclosure:  I, Debby Maria Guadalupe, am serving as a scribe on 3/24/2020 at 8:10 PM to personally document services performed by Marni Bradley MD based on my observations and the provider's statements to me.       3/24/2020   [facility] EMERGENCY DEPARTMENT       Marni Bradley MD  03/25/20 0113

## 2020-03-26 ENCOUNTER — VIRTUAL VISIT (OUTPATIENT)
Dept: FAMILY MEDICINE | Facility: CLINIC | Age: 55
End: 2020-03-26
Payer: COMMERCIAL

## 2020-03-26 DIAGNOSIS — L30.8 OTHER ECZEMA: ICD-10-CM

## 2020-03-26 DIAGNOSIS — K57.32 DIVERTICULITIS OF COLON: ICD-10-CM

## 2020-03-26 DIAGNOSIS — F32.0 MILD MAJOR DEPRESSION (H): ICD-10-CM

## 2020-03-26 DIAGNOSIS — F10.930 ALCOHOL WITHDRAWAL SYNDROME WITHOUT COMPLICATION (H): Primary | ICD-10-CM

## 2020-03-26 DIAGNOSIS — I10 BENIGN ESSENTIAL HYPERTENSION: ICD-10-CM

## 2020-03-26 DIAGNOSIS — F51.01 PRIMARY INSOMNIA: ICD-10-CM

## 2020-03-26 PROCEDURE — 99214 OFFICE O/P EST MOD 30 MIN: CPT | Mod: TEL | Performed by: FAMILY MEDICINE

## 2020-03-26 RX ORDER — DIAZEPAM 5 MG
5 TABLET ORAL EVERY 6 HOURS PRN
Qty: 6 TABLET | Refills: 0 | Status: SHIPPED | OUTPATIENT
Start: 2020-03-26 | End: 2020-04-02

## 2020-03-26 RX ORDER — NIFEDIPINE 30 MG
30 TABLET, EXTENDED RELEASE ORAL DAILY
Qty: 30 TABLET | Refills: 1 | Status: SHIPPED | OUTPATIENT
Start: 2020-03-26 | End: 2021-04-27

## 2020-03-26 RX ORDER — TRIAMCINOLONE ACETONIDE 1 MG/G
CREAM TOPICAL 2 TIMES DAILY
Qty: 80 G | Refills: 3 | Status: SHIPPED | OUTPATIENT
Start: 2020-03-26 | End: 2021-04-27

## 2020-03-26 RX ORDER — ONDANSETRON 4 MG/1
4 TABLET, ORALLY DISINTEGRATING ORAL EVERY 8 HOURS PRN
Qty: 10 TABLET | Refills: 0 | Status: SHIPPED | OUTPATIENT
Start: 2020-03-26 | End: 2020-04-02

## 2020-03-26 RX ORDER — TRAZODONE HYDROCHLORIDE 100 MG/1
100 TABLET ORAL AT BEDTIME
Qty: 90 TABLET | Refills: 3 | Status: SHIPPED | OUTPATIENT
Start: 2020-03-26 | End: 2021-04-27

## 2020-03-26 NOTE — PROGRESS NOTES
"Jung Severino is a 54 year old male who is being evaluated via a billable telephone visit.      The patient has been notified of following:     \"This telephone visit will be conducted via a call between you and your physician/provider. We have found that certain health care needs can be provided without the need for a physical exam.  This service lets us provide the care you need with a short phone conversation.  If a prescription is necessary we can send it directly to your pharmacy.  If lab work is needed we can place an order for that and you can then stop by our lab to have the test done at a later time.    If during the course of the call the physician/provider feels a telephone visit is not appropriate, you will not be charged for this service.\"     Jung Severino complains of   Chief Complaint   Patient presents with     ed follow up       I have reviewed and updated the patient's Past Medical History, Social History, Family History and Medication List.    ALLERGIES  Sulfa drugs and Tetanus [tetanus toxoids]    ED/UC Followup:    Facility: [facility]  Date of visit: 3/24/20  Reason for visit: Traumatic hematoma of lower back, initial encounter   Traumatic ecchymosis of flank, initial encounter   Alcohol withdrawal, uncomplicated (H)   Hypomagnesemia   Hypokalemia   Current Status: Better     The pain is very sever when he came in, but it is getting better, pt has blisters on the lower back due to heat pads, that is getting better.  His nausea is improved, but when he is eating solid food, he has been getting diarrhea, pt is still having muscle spasm,  And he has been taking valium for his anxiety and muscle spasm.   He is still having problem with sleeping at night, and he is asking for refill on his trazodone as it is helping with his sleep.      Assessment/Plan:  1. Benign essential hypertension  Start on new medicine :   - NIFEdipine ER (ADALAT CC) 30 MG 24 hr tablet; Take 1 tablet (30 mg) by mouth " daily  Dispense: 30 tablet; Refill: 1  Recheck in 1 week.   At that time, will repeat BMP and magnesium.    2. Alcohol withdrawal syndrome without complication (H)    - diazepam (VALIUM) 5 MG tablet; Take 1 tablet (5 mg) by mouth every 6 hours as needed for muscle spasms or withdrawal  Dispense: 6 tablet; Refill: 0  - ondansetron (ZOFRAN ODT) 4 MG ODT tab; Take 1 tablet (4 mg) by mouth every 8 hours as needed for nausea or vomiting  Dispense: 10 tablet; Refill: 0    3. Diverticulitis of colon  Take Abx, low fiber diet.    4. Mild major depression (H)      5. Other eczema    - triamcinolone (KENALOG) 0.1 % external cream; Apply topically 2 times daily  Dispense: 80 g; Refill: 3    6. Primary insomnia  Continue on   - traZODone (DESYREL) 100 MG tablet; Take 1 tablet (100 mg) by mouth At Bedtime  Dispense: 90 tablet; Refill: 3    Phone call duration:  21 minutes  This was done as a phone visit in order to mitigate transmission of COVID-19 virus during the epidemic time, some of the medical decisions were made due to the limitations of phone visit (lack of physical exam, inability to do blood tests and so forth)    .    Yasmin Rodríguez MD

## 2020-04-02 ENCOUNTER — VIRTUAL VISIT (OUTPATIENT)
Dept: FAMILY MEDICINE | Facility: CLINIC | Age: 55
End: 2020-04-02
Payer: COMMERCIAL

## 2020-04-02 DIAGNOSIS — F10.10 ALCOHOL ABUSE: ICD-10-CM

## 2020-04-02 DIAGNOSIS — K57.32 DIVERTICULITIS OF COLON: ICD-10-CM

## 2020-04-02 DIAGNOSIS — S20.229A CONTUSION OF BACK, UNSPECIFIED LATERALITY, INITIAL ENCOUNTER: Primary | ICD-10-CM

## 2020-04-02 DIAGNOSIS — I10 ESSENTIAL HYPERTENSION: ICD-10-CM

## 2020-04-02 PROCEDURE — 99214 OFFICE O/P EST MOD 30 MIN: CPT | Mod: TEL | Performed by: FAMILY MEDICINE

## 2020-04-02 NOTE — PROGRESS NOTES
"Subjective     Jung Severino is a 54 year old male who is being evaluated via a billable telephone visit.      The patient has been notified of following:     \"This telephone visit will be conducted via a call between you and your physician/provider. We have found that certain health care needs can be provided without the need for a physical exam.  This service lets us provide the care you need with a short phone conversation.  If a prescription is necessary we can send it directly to your pharmacy.  If lab work is needed we can place an order for that and you can then stop by our lab to have the test done at a later time.    If during the course of the call the physician/provider feels a telephone visit is not appropriate, you will not be charged for this service.\"     Patient has given verbal consent for Telephone visit?  Yes    Jung Severino complains of No chief complaint on file.      ALLERGIES  Sulfa drugs and Tetanus [tetanus toxoids]    ED/UC Followup:    Facility:  [facility]  Date of visit: 3/24/20  Reason for visit: Fall, Alcohol withdrawal   Current Status: Pain is better since yesterday   The pain is better, there is still bruising on the back into the buttocks, and now he is can sit and drive his car and sleep without discomfort.  In regards to his alcohol use, he has been sober, with no nausea, his sleep pattern was disturbed, but now he is sleeping better.    In regards to his diverticulitis, his stool is back to normal, and no nausea, he is eating normally.     Hypertension Follow-up      Do you check your blood pressure regularly outside of the clinic? No     Are you following a low salt diet? No    Are your blood pressures ever more than 140 on the top number (systolic) OR more   than 90 on the bottom number (diastolic), for example 140/90? N/A.               Patient Active Problem List   Diagnosis     Family history of other cardiovascular diseases     Closed fracture of head of " radius     Contusion of face, scalp, and neck except eye(s)     Injury, other and unspecified, hand, except finger     Malaise and fatigue     Anxiety     Mild major depression (H)     CARDIOVASCULAR SCREENING; LDL GOAL LESS THAN 130     Alcohol withdrawal syndrome (H)     C. difficile colitis     Alcohol abuse     Benign essential hypertension     Diverticulitis of colon     Past Surgical History:   Procedure Laterality Date     NOT IN USE         Social History     Tobacco Use     Smoking status: Never Smoker     Smokeless tobacco: Current User     Types: Chew     Tobacco comment: chewing tobacco   Substance Use Topics     Alcohol use: Not Currently     Alcohol/week: 0.0 standard drinks     Binge frequency: Less than monthly     Family History   Problem Relation Age of Onset     Heart Disease Father      Cancer No family hx of      Diabetes No family hx of          Current Outpatient Medications   Medication Sig Dispense Refill     diazepam (VALIUM) 5 MG tablet Take 1 tablet (5 mg) by mouth every 6 hours as needed for muscle spasms or withdrawal 6 tablet 0     multivitamin, therapeutic with minerals (THERA-VIT-M) TABS tablet Take 1 tablet by mouth daily 30 each 0     NIFEdipine ER (ADALAT CC) 30 MG 24 hr tablet Take 1 tablet (30 mg) by mouth daily 30 tablet 1     traZODone (DESYREL) 100 MG tablet Take 1 tablet (100 mg) by mouth At Bedtime 90 tablet 3     triamcinolone (KENALOG) 0.1 % external cream Apply topically 2 times daily 80 g 3     Allergies   Allergen Reactions     Sulfa Drugs      Tetanus [Tetanus Toxoids] Nausea and Vomiting     High Fever x4 days       Reviewed and updated as needed this visit by Provider         Review of Systems   ROS COMP: CONSTITUTIONAL: NEGATIVE for fever, chills, change in weight  RESP: NEGATIVE for significant cough or SOB  CV: NEGATIVE for chest pain, palpitations or peripheral edema       Objective   Reported vitals:  There were no vitals taken for this visit.   healthy, alert  and no distress  Psych: Alert and oriented times 3; coherent speech, normal   rate and volume, able to articulate logical thoughts, able   to abstract reason, no tangential thoughts, no hallucinations   or delusions  His affect is normal.             Assessment/Plan:  1. Contusion of back, unspecified laterality, initial encounter  Improved, tylenol as needed    2. Alcohol abuse  Sober for 2 weeks, improving, continue on MVI, and congratulated about sobriety.   - **Comprehensive metabolic panel FUTURE anytime; Future  - Magnesium; Future    3. Diverticulitis of colon  Much improved, may advance diet in 1 week to normal/high fiber diet, refer to Colonoscopy in 2 to 3 months  - GASTROENTEROLOGY ADULT REF PROCEDURE ONLY [facility] [phone]; [facility]    4. Essential hypertension  Pt to check BP and let us know the results. Adjust medicine accordingly.      Follow up in 3 months.    Phone call duration:  12 minutes    Yasmin Rodríguez MD

## 2020-04-03 DIAGNOSIS — F10.10 ALCOHOL ABUSE: ICD-10-CM

## 2020-04-03 LAB
ALBUMIN SERPL-MCNC: 3.4 G/DL (ref 3.4–5)
ALP SERPL-CCNC: 77 U/L (ref 40–150)
ALT SERPL W P-5'-P-CCNC: 41 U/L (ref 0–70)
ANION GAP SERPL CALCULATED.3IONS-SCNC: 6 MMOL/L (ref 3–14)
AST SERPL W P-5'-P-CCNC: 76 U/L (ref 0–45)
BILIRUB SERPL-MCNC: 0.9 MG/DL (ref 0.2–1.3)
BUN SERPL-MCNC: 11 MG/DL (ref 7–30)
CALCIUM SERPL-MCNC: 8.6 MG/DL (ref 8.5–10.1)
CHLORIDE SERPL-SCNC: 106 MMOL/L (ref 94–109)
CO2 SERPL-SCNC: 25 MMOL/L (ref 20–32)
CREAT SERPL-MCNC: 0.91 MG/DL (ref 0.66–1.25)
GFR SERPL CREATININE-BSD FRML MDRD: >90 ML/MIN/{1.73_M2}
GLUCOSE SERPL-MCNC: 149 MG/DL (ref 70–99)
MAGNESIUM SERPL-MCNC: 2 MG/DL (ref 1.6–2.3)
POTASSIUM SERPL-SCNC: 3.8 MMOL/L (ref 3.4–5.3)
PROT SERPL-MCNC: 7.8 G/DL (ref 6.8–8.8)
SODIUM SERPL-SCNC: 137 MMOL/L (ref 133–144)

## 2020-04-03 PROCEDURE — 36415 COLL VENOUS BLD VENIPUNCTURE: CPT | Performed by: FAMILY MEDICINE

## 2020-04-03 PROCEDURE — 80053 COMPREHEN METABOLIC PANEL: CPT | Performed by: FAMILY MEDICINE

## 2020-04-03 PROCEDURE — 83735 ASSAY OF MAGNESIUM: CPT | Performed by: FAMILY MEDICINE

## 2020-04-10 ENCOUNTER — TELEPHONE (OUTPATIENT)
Dept: FAMILY MEDICINE | Facility: CLINIC | Age: 55
End: 2020-04-10

## 2020-04-10 NOTE — TELEPHONE ENCOUNTER
Nabeel was told to call in his BP readings per Dr Rodríguez    131/89 pulse 85--taken a couple of hours ago.    Please let patient know if this is good.

## 2020-04-10 NOTE — TELEPHONE ENCOUNTER
I think it is better, let's continue on same dose and follow up with him in July.  Yasmin Rodríguez MD  [facility]  [phone]  r

## 2020-05-22 ENCOUNTER — TELEPHONE (OUTPATIENT)
Dept: FAMILY MEDICINE | Facility: CLINIC | Age: 55
End: 2020-05-22

## 2020-05-22 NOTE — TELEPHONE ENCOUNTER
Called patient back.  The last 2 weeks he has been a lot more active and at end of day ankles are swollen.  Feels it is helping bp.  Was (L) ankle first and now if both of them.  Near normal in am.  Does not have compression stockings as this is a new issue.  Feeling fine in general.  He only has a few pills left so wanting to know if you want to change his med?  Please advise.  Call him back with plan.  Bettie Cardenas RN

## 2020-05-22 NOTE — TELEPHONE ENCOUNTER
Nabeel thinks he is having a side effect of his BP medication.  He has swollen ankles.    BP is better with the medication    BP this morning was 131/96    He would like to know what he should do.

## 2020-05-26 NOTE — TELEPHONE ENCOUNTER
Dr. Marcos RASHEED   Spoke to patient - agreeable to video visit - RN sent instructions via my chart and scheduled for 6/1/2020  Patient did stop taking the blood pressure medication and the swelling has gone down in his ankles. He has not checked blood pressure since stopping. Ankles were the size of calfs and had to stop medication, his ankles are now normal sized.   RN asked patient to check bp when home today     Tracy Altamirano, Registered Nurse   [facility]

## 2020-06-01 ENCOUNTER — VIRTUAL VISIT (OUTPATIENT)
Dept: FAMILY MEDICINE | Facility: CLINIC | Age: 55
End: 2020-06-01
Payer: COMMERCIAL

## 2020-06-01 DIAGNOSIS — I10 ESSENTIAL HYPERTENSION: ICD-10-CM

## 2020-06-01 DIAGNOSIS — I10 ESSENTIAL HYPERTENSION: Primary | ICD-10-CM

## 2020-06-01 PROCEDURE — 99213 OFFICE O/P EST LOW 20 MIN: CPT | Mod: 95 | Performed by: FAMILY MEDICINE

## 2020-06-01 RX ORDER — LOSARTAN POTASSIUM 50 MG/1
TABLET ORAL
Qty: 90 TABLET | OUTPATIENT
Start: 2020-06-01

## 2020-06-01 RX ORDER — LOSARTAN POTASSIUM 50 MG/1
50 TABLET ORAL DAILY
Qty: 30 TABLET | Refills: 1 | Status: SHIPPED | OUTPATIENT
Start: 2020-06-01 | End: 2020-10-08

## 2020-06-01 NOTE — PROGRESS NOTES
"Jung Severino is a 54 year old male who is being evaluated via a billable telephone visit.      The patient has been notified of following:     \"This telephone visit will be conducted via a call between you and your physician/provider. We have found that certain health care needs can be provided without the need for a physical exam.  This service lets us provide the care you need with a short phone conversation.  If a prescription is necessary we can send it directly to your pharmacy.  If lab work is needed we can place an order for that and you can then stop by our lab to have the test done at a later time.    Telephone visits are billed at different rates depending on your insurance coverage. During this emergency period, for some insurers they may be billed the same as an in-person visit.  Please reach out to your insurance provider with any questions.    If during the course of the call the physician/provider feels a telephone visit is not appropriate, you will not be charged for this service.\"    Patient has given verbal consent for Telephone visit?  Yes    What phone number would you like to be contacted at? [phone]    How would you like to obtain your AVS? Brian Vincent     Jung Severino is a 54 year old male who presents via phone visit today for the following health issues:    HPI  Hypertension Follow-up      Do you check your blood pressure regularly outside of the clinic? Yes     Are you following a low salt diet? Yes    Are your blood pressures ever more than 140 on the top number (systolic) OR more   than 90 on the bottom number (diastolic), for example 140/90? No  131/89 last checked     Patient had a reaction to last BP med (Nifedipine), pt has noticed swelling in the left ankle, then into both lower legs, so he stopped it in the last week.   After he stopped it, the swelling went down.             Patient Active Problem List   Diagnosis     Family history of other cardiovascular " diseases     Closed fracture of head of radius     Contusion of face, scalp, and neck except eye(s)     Injury, other and unspecified, hand, except finger     Malaise and fatigue     Anxiety     Mild major depression (H)     CARDIOVASCULAR SCREENING; LDL GOAL LESS THAN 130     Alcohol withdrawal syndrome (H)     C. difficile colitis     Alcohol abuse     Benign essential hypertension     Diverticulitis of colon     Past Surgical History:   Procedure Laterality Date     NOT IN USE         Social History     Tobacco Use     Smoking status: Never Smoker     Smokeless tobacco: Current User     Types: Chew     Tobacco comment: chewing tobacco   Substance Use Topics     Alcohol use: Not Currently     Alcohol/week: 0.0 standard drinks     Binge frequency: Less than monthly     Family History   Problem Relation Age of Onset     Heart Disease Father      Cancer No family hx of      Diabetes No family hx of          Current Outpatient Medications   Medication Sig Dispense Refill     losartan (COZAAR) 50 MG tablet Take 1 tablet (50 mg) by mouth daily 30 tablet 1     multivitamin, therapeutic with minerals (THERA-VIT-M) TABS tablet Take 1 tablet by mouth daily 30 each 0     NIFEdipine ER (ADALAT CC) 30 MG 24 hr tablet Take 1 tablet (30 mg) by mouth daily 30 tablet 1     traZODone (DESYREL) 100 MG tablet Take 1 tablet (100 mg) by mouth At Bedtime 90 tablet 3     triamcinolone (KENALOG) 0.1 % external cream Apply topically 2 times daily 80 g 3       Reviewed and updated as needed this visit by Provider         Review of Systems   CONSTITUTIONAL: NEGATIVE for fever, chills, change in weight  CV: leg swelling while on the medicine.       Objective   Reported vitals:  There were no vitals taken for this visit.   healthy, alert and no distress  PSYCH: Alert and oriented times 3; coherent speech, normal   rate and volume, able to articulate logical thoughts, able   to abstract reason, no tangential thoughts, no hallucinations   or  delusions  His affect is normal  RESP: No cough, no audible wheezing, able to talk in full sentences  Remainder of exam unable to be completed due to telephone visits            Assessment/Plan:  1. Essential hypertension  Side effects with Nifedipine, will stop it and start on   - losartan (COZAAR) 50 MG tablet; Take 1 tablet (50 mg) by mouth daily  Dispense: 30 tablet; Refill: 1  Take 1/2 a pill for 1 week, then increase to 1 tab daily.    When asked about alcohol use, pt has stopped alcohol completely since the ER visit in 3/24.    Phone call duration:  8 minutes    Yasmin Rodríguez MD

## 2020-10-08 DIAGNOSIS — I10 ESSENTIAL HYPERTENSION: ICD-10-CM

## 2020-10-08 RX ORDER — LOSARTAN POTASSIUM 50 MG/1
TABLET ORAL
Qty: 30 TABLET | Refills: 1 | Status: SHIPPED | OUTPATIENT
Start: 2020-10-08 | End: 2021-04-27

## 2020-10-08 RX ORDER — LOSARTAN POTASSIUM 50 MG/1
TABLET ORAL
Qty: 90 TABLET | OUTPATIENT
Start: 2020-10-08

## 2020-10-08 NOTE — TELEPHONE ENCOUNTER
Routing refill request to provider for review/approval because:  Failing bp  Allergy listed    Bettie Cardenas, RN

## 2020-10-08 NOTE — LETTER
[facility]  [address] [facility] [address] [address]  [phone]          October 8, 2020    Jung Severino                                                                                                                     [address] [address] [facility] [address]            Dear Jung,  We have made several attempts to contact you.  We have sent in a refill for your medication but we will need to get you to schedule an office visit prior to any further refills.  You can call the clinic and we can assist you in scheduling. [phone]        Sincerely,         Yasmin Rodríguez MD

## 2020-10-08 NOTE — TELEPHONE ENCOUNTER
Approved, but I wonder if patient is ok to come in for Office visit.  Yasmin Rodríguez MD  [facility]  [phone]

## 2021-01-09 ENCOUNTER — HEALTH MAINTENANCE LETTER (OUTPATIENT)
Age: 56
End: 2021-01-09

## 2021-04-27 ENCOUNTER — OFFICE VISIT (OUTPATIENT)
Dept: FAMILY MEDICINE | Facility: CLINIC | Age: 56
End: 2021-04-27
Payer: COMMERCIAL

## 2021-04-27 VITALS
DIASTOLIC BLOOD PRESSURE: 122 MMHG | BODY MASS INDEX: 32.71 KG/M2 | OXYGEN SATURATION: 96 % | WEIGHT: 215.8 LBS | HEIGHT: 68 IN | HEART RATE: 131 BPM | SYSTOLIC BLOOD PRESSURE: 182 MMHG | TEMPERATURE: 98.1 F

## 2021-04-27 DIAGNOSIS — L03.319 CELLULITIS AND ABSCESS OF TRUNK: Primary | ICD-10-CM

## 2021-04-27 DIAGNOSIS — I10 ESSENTIAL HYPERTENSION: ICD-10-CM

## 2021-04-27 DIAGNOSIS — L30.8 OTHER ECZEMA: ICD-10-CM

## 2021-04-27 DIAGNOSIS — L02.219 CELLULITIS AND ABSCESS OF TRUNK: Primary | ICD-10-CM

## 2021-04-27 DIAGNOSIS — F51.01 PRIMARY INSOMNIA: ICD-10-CM

## 2021-04-27 PROCEDURE — 99214 OFFICE O/P EST MOD 30 MIN: CPT | Performed by: PHYSICIAN ASSISTANT

## 2021-04-27 RX ORDER — TRIAMCINOLONE ACETONIDE 1 MG/G
CREAM TOPICAL 2 TIMES DAILY
Qty: 80 G | Refills: 3 | Status: SHIPPED | OUTPATIENT
Start: 2021-04-27 | End: 2022-01-20

## 2021-04-27 RX ORDER — LOSARTAN POTASSIUM 50 MG/1
50 TABLET ORAL DAILY
Qty: 90 TABLET | Refills: 1 | Status: SHIPPED | OUTPATIENT
Start: 2021-04-27 | End: 2021-11-08

## 2021-04-27 RX ORDER — DOXYCYCLINE HYCLATE 100 MG
100 TABLET ORAL 2 TIMES DAILY
Qty: 20 TABLET | Refills: 0 | Status: SHIPPED | OUTPATIENT
Start: 2021-04-27 | End: 2021-05-07

## 2021-04-27 RX ORDER — TRAZODONE HYDROCHLORIDE 100 MG/1
100 TABLET ORAL AT BEDTIME
Qty: 90 TABLET | Refills: 3 | Status: SHIPPED | OUTPATIENT
Start: 2021-04-27 | End: 2022-01-20

## 2021-04-27 ASSESSMENT — MIFFLIN-ST. JEOR: SCORE: 1778.36

## 2021-04-27 ASSESSMENT — PAIN SCALES - GENERAL: PAINLEVEL: EXTREME PAIN (8)

## 2021-04-27 NOTE — PROGRESS NOTES
Assessment & Plan     Essential hypertension    Re-start losartan. Patient will call or send a [employer] message in about a month with some blood pressure readings (his wife is a nurse).     - losartan (COZAAR) 50 MG tablet; Take 1 tablet (50 mg) by mouth daily      Other eczema    Refilled.    - triamcinolone (KENALOG) 0.1 % external cream; Apply topically 2 times daily      Primary insomnia    Refilled. Works well.    - traZODone (DESYREL) 100 MG tablet; Take 1 tablet (100 mg) by mouth At Bedtime      Cellulitis and abscess of trunk    Start on antibiotic. Hot pack. Follow-up in 3 days if it is not draining and is still painful to consider I & D.     - doxycycline hyclate (VIBRA-TABS) 100 MG tablet; Take 1 tablet (100 mg) by mouth 2 times daily for 10 days               There are no Patient Instructions on file for this visit.    No follow-ups on file.    NICOLE [facility]    Carlton Lees is a 55 year old who presents for the following health issues     HPI     Concern - Cyst on Back  Onset: 2 weeks  Description: Middle Back / left side  Intensity: moderate  Progression of Symptoms: worsening  Accompanying Signs & Symptoms: painful, red, raised, doesn't feel good  Previous history of similar problem: fall of 2019  Precipitating factors:        Worsened by: laying supine or back against a chair   Alleviating factors:        Improved by: none  Therapies tried and outcome: Advil - not effective      Hypertension Follow-up       Do you check your blood pressure regularly outside of the clinic? No     Are you following a low salt diet? No    Are your blood pressures ever more than 140 on the top number (systolic) OR more   than 90 on the bottom number (diastolic), for example 140/90? Yes      How many servings of fruits and vegetables do you eat daily?  0-1    On average, how many sweetened beverages do you drink each day (Examples: soda, juice, sweet tea, etc.  Do NOT  "count diet or artificially sweetened beverages)?   0    How many days per week do you exercise enough to make your heart beat faster? 3 or less    How many minutes a day do you exercise enough to make your heart beat faster? 9 or less    How many days per week do you miss taking your medication? 0      Review of Systems   Constitutional, HEENT, cardiovascular, pulmonary, gi and gu systems are negative, except as otherwise noted.        Objective    BP (!) 182/122 (BP Location: Right arm, Patient Position: Sitting, Cuff Size: Adult Large)   Pulse 131   Temp 98.1  F (36.7  C) (Oral)   Ht 1.727 m (5' 8\")   Wt 97.9 kg (215 lb 12.8 oz)   SpO2 96%   BMI 32.81 kg/m    Body mass index is 32.81 kg/m .       Physical Exam   GENERAL: healthy, alert and no distress  EYES: Eyes grossly normal to inspection, PERRL and conjunctivae and sclerae normal  MS: no gross musculoskeletal defects noted, no edema  SKIN: Abscess present on left side of mid back. There is mild erythema surrounding the area. Tender to palpation. Some areas are soft and fluctuant but overall it is firm and non-fluctuant. It seems to be oblong in shape and is about 2-3 cm in length by 1 cm in width.  NEURO: Normal strength and tone, mentation intact and speech normal  PSYCH: mentation appears normal, affect normal/bright                "

## 2021-04-27 NOTE — PATIENT INSTRUCTIONS
Take the complete course of the antibiotic.     Apply hot packs as often as possible.    Follow-up on Friday if not improving and not draining.

## 2021-04-30 ENCOUNTER — OFFICE VISIT (OUTPATIENT)
Dept: FAMILY MEDICINE | Facility: CLINIC | Age: 56
End: 2021-04-30
Payer: COMMERCIAL

## 2021-04-30 VITALS
SYSTOLIC BLOOD PRESSURE: 156 MMHG | WEIGHT: 216.1 LBS | OXYGEN SATURATION: 95 % | RESPIRATION RATE: 18 BRPM | DIASTOLIC BLOOD PRESSURE: 106 MMHG | TEMPERATURE: 98.3 F | BODY MASS INDEX: 32.86 KG/M2 | HEART RATE: 118 BPM

## 2021-04-30 DIAGNOSIS — I10 ESSENTIAL HYPERTENSION: ICD-10-CM

## 2021-04-30 DIAGNOSIS — L02.219 ABSCESS, TRUNK: Primary | ICD-10-CM

## 2021-04-30 PROCEDURE — 99213 OFFICE O/P EST LOW 20 MIN: CPT | Mod: 25 | Performed by: NURSE PRACTITIONER

## 2021-04-30 PROCEDURE — 10060 I&D ABSCESS SIMPLE/SINGLE: CPT | Performed by: NURSE PRACTITIONER

## 2021-04-30 NOTE — PROGRESS NOTES
Pre-Visit Planning :     Future Appointments   Date Time Provider Department Center   4/30/2021  2:40 PM Britney Schaeffer APRN CNP [facility]      Arrival Time for this Appointment:  2:30 PM      Appointment Notes for this encounter:    Abcess on back follow-up from 4/27, still not draining and has not improved (see 4/27 visit for further detail)  Concern - Cyst on Back    4/27/21;      Onset: 2 weeks                   Description: Middle Back / left side                   Intensity: moderate                  Progression of Symptoms: worsening                 Accompanying Signs & Symptoms: painful, red, raised, doesn't feel good                 Previous history of similar problem: fall of 2019                 Precipitating factors:                 Worsened by: laying supine or back against a chair                       Alleviating factors:                 Improved by: none     Therapies tried and outcome: Advil - not effective       Questionnaires Reviewed/Assigned:   PHQ-9. ASHISH-7    Are there any additional questions or concerns you'd like to review with your provider during your visit? N/A          Last OV with provider: 4/27/2021; Terrell English PA-C; Cellulitis and abscess of trunk; Essential hypertension; Other eczema; Primary insomnia            6/1/2020; Dr. Rodríguez; Essential hypertension              Hospital ER Visits:  3/24/2020; [facility]; Dr. Marni Bradley MD; Traumatic hematoma of lower back, initial encounter; Traumatic ecchymosis of flank, initial encounter; Alcohol withdrawal, uncomplicated (H); Hypomagnesemia; Hypokalemia    Is the visit preventive? NO     Specialty Visits:  N/A     Imaging and Lab Review:   4/3/2020; AST- 76  High                                                3/24/2020;  EXAM: CT CHEST/ABDOMEN/PELVIS W CONTRAST; INDICATION: 54-year-old male with recent fall and left chest/rib pain. Patient also notes left back pain with increasing bruising on the left flank and back;                     IMPRESSION:1.  In the left flank and posterior aspect of the left back to the left of midline dorsal to the paraspinous musculature there is diffuse high density fluid most compatible with blood products related to diffuse ecchymosis with a                        more focal subcutaneous  hematoma along the left paraspinal musculature dorsally in the subcutaneous tissues measuring 9.2 x 3.7 x 9.1 cm.2.  No evidence for underlying acute fracture or dislocation. 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 3.  No evidence for acute thoracic or abdominal aortic injury. No free fluid in the pelvis.4.  No evidence for pneumothorax or pulmonary contusion. 4 mm mean               diameter pulmonary nodule left major fissure. Continued follow-up recommended in 12 months.5.  Hepatomegaly with diffuse fatty infiltration of the liver.6.  No evidence for solid organ injury in the upper abdomen.                                       7.  Minimal wall thickening involving the proximal sigmoid colon with associated mild wall thickening subtle early inflammatory change. Findings suspicious for early or mild diverticulitis      Recent Procedures:  2/25/2008; SURGEON:  Surinder Means MD-Orthopaedic Surgery;  PROCEDURE:  Left knee arthroscopy with arthroscopic partial medial and lateral meniscectomies ; POSTOPERATIVE DIAGNOSIS:  Left knee pain with medial and lateral meniscus tears    MEDS ;    Current Outpatient Medications   Medication     doxycycline hyclate (VIBRA-TABS) 100 MG tablet     losartan (COZAAR) 50 MG tablet     multivitamin, therapeutic with minerals (THERA-VIT-M) TABS tablet     traZODone (DESYREL) 100 MG tablet     triamcinolone (KENALOG) 0.1 % external cream     No current facility-administered medications for this visit.       Is there anything on your medication list that needs to be updated?  Ask pt @ check-in     Health  "Maintenance Due   Topic Date Due     PREVENTIVE CARE VISIT  Never done     ANNUAL REVIEW OF HM ORDERS  Never done     ADVANCE CARE PLANNING  Never done     COLORECTAL CANCER SCREENING  Never done     HIV SCREENING  Never done     COVID-19 Vaccine (1) Never done     HEPATITIS C SCREENING  Never done     DTAP/TDAP/TD IMMUNIZATION (1 - Tdap) Never done     ZOSTER IMMUNIZATION (1 of 2) Never done     PHQ-9  09/04/2019     INFLUENZA VACCINE (1) 09/01/2020     Preferred pharmacy: [id number] - [facility] [address]     MyChart:  YES    Questionnaire Review :  Lastly, it appears there are some questions your care team has for you.    If MyChart ACTIVE \"If you get a chance to do your questions on [employer], your appointment will be much faster and smoother so feel free to sign in and go through those, otherwise please plan on arriving early so that you have time to complete them.     Patient preferred phone number: [phone]    Valerie Fay, Registered Nurse, Patient Advocate Liaison [facility]   [phone]    "

## 2021-04-30 NOTE — PROGRESS NOTES
"    Assessment & Plan     Abscess, trunk  Discussed treatment options for symptoms, patient opted for incision and drainage.   Discussed risk and benefits of procedure. Consent was obtained.   Area cleansed in aseptic fashion. 0.25 ml 1% lidocaine with epi injected to superficial tissues of fluctuance. A small puncture incision was made without difficulty, large amount of purulent drainage expressed. Abscess dressed. Discussed after care. Patient to complete his antibiotic therapy.   Patient tolerated procedure well.   Patient opted to discuss capsule excision with Dermatology given reoccurrence x 3.   - DERMATOLOGY ADULT REFERRAL  - DRAIN SKIN ABSCESS SIMPLE/SINGLE    Essential hypertension  Improving. Restarted losartan three days ago without side effects. Will have medically trained family check his blood pressure when they return home next week and notify clinic if sustaining >150/90's.  Follow-up 1 month; sooner as needed.                Tobacco Cessation:   reports that he has never smoked. His smokeless tobacco use includes chew.      BMI:   Estimated body mass index is 32.86 kg/m  as calculated from the following:    Height as of 4/27/21: 1.727 m (5' 8\").    Weight as of this encounter: 98 kg (216 lb 1.6 oz).           Return in about 4 weeks (around 5/28/2021) for Hypertension, Preventive Visit with PCP.    JUANA Turner [facility]    Carlton Lees is a 55 year old who presents for the following health issues     HPI     Follow-up to cellulitis and abscess, seen 4/27/21.   Is taking doxycyline as ordered.   Some hot packing but hard to reach area, no drainage noted.     Losartan 50 mg restarted 3 days ago as well, blood pressure with slight improvement.   Denies chest pain, shortness of breath, headache, dizziness,       Review of Systems   Constitutional, HEENT, cardiovascular, pulmonary, gi and gu systems are negative, except as otherwise noted.      Objective  "   BP (!) 156/106 (BP Location: Right arm, Patient Position: Chair, Cuff Size: Adult Regular)   Pulse 118   Temp 98.3  F (36.8  C) (Oral)   Resp 18   Wt 98 kg (216 lb 1.6 oz)   SpO2 95%   BMI 32.86 kg/m    Body mass index is 32.86 kg/m .  Physical Exam   GENERAL: healthy, alert and no distress  RESP: regular rate and effort  SKIN: left flank with approximately 2x4 cm abscess, easily fluctuant and indurated. Large amount of purulent drainage expressed from abscess, immediate relief to patient.

## 2021-10-23 ENCOUNTER — HEALTH MAINTENANCE LETTER (OUTPATIENT)
Age: 56
End: 2021-10-23

## 2021-11-05 DIAGNOSIS — I10 ESSENTIAL HYPERTENSION: ICD-10-CM

## 2021-11-05 NOTE — LETTER
November 10, 2021      Jung Severino  [address] [address] [address]      Dear Jung,    We recently received a call from your pharmacy requesting a refill of your medication.    A review of your chart indicates that an appointment is required with your provider.  Please call the clinic to schedule your appointment.    We have authorized one refill of your medication to allow time for you to schedule.   If you have a history of diabetes or high cholesterol, please come in fasting for the appointment. Fasting entails nothing to eat or drink 8 hours prior to your appointment; with the exception on water. You may take your medication the day of the appointment.    Thank you,      Terrell English PA-C

## 2021-11-08 RX ORDER — LOSARTAN POTASSIUM 50 MG/1
TABLET ORAL
Qty: 30 TABLET | Refills: 0 | Status: ON HOLD | OUTPATIENT
Start: 2021-11-08 | End: 2021-12-28

## 2021-11-08 NOTE — TELEPHONE ENCOUNTER
Please call, pt needs to be seen.  Yasmin Rodríguez MD  [facility]  [phone]

## 2021-11-08 NOTE — TELEPHONE ENCOUNTER
Routing refill request to provider for review/approval because:  Labs not current:  HOLLY NUÑEZ  Elevated BP    Nalini Greene RN on 11/8/2021 at 10:00 AM

## 2021-12-02 ENCOUNTER — VIRTUAL VISIT (OUTPATIENT)
Dept: INTERNAL MEDICINE | Facility: CLINIC | Age: 56
End: 2021-12-02
Payer: COMMERCIAL

## 2021-12-02 DIAGNOSIS — R05.9 COUGH: Primary | ICD-10-CM

## 2021-12-02 PROCEDURE — 99213 OFFICE O/P EST LOW 20 MIN: CPT | Mod: TEL | Performed by: INTERNAL MEDICINE

## 2021-12-02 NOTE — PROGRESS NOTES
Jung Severino is a 56 year oldwho is being evaluated via a billable telephone visit.       What phone number would you like to be contacted at? [phone]      Assessment & Plan  Cough mild with abrupt onset of symptoms last evening.  Associated symptoms include fever chills body aches.  Today check COVID-19 PCR and nasal smear for influenza AB.     11 minutes spent on the date of the encounter doing chart review, patient visit and documentation        Subjective   Abrupt onset of symptoms including slight cough worse when recumbent plus headache body aches chills fever.  Abrupt onset to the date and time yesterday evening typical for influenza COVID-19 not ruled out.  Pandemic.     Review of Systems   No blood in stool urine or sputum medication list reviewed reconciled.       Jose Forbes MD  Answers for HPI/ROS submitted by the patient on 12/2/2021  How many servings of fruits and vegetables do you eat daily?: 2-3  On average, how many sweetened beverages do you drink each day (Examples: soda, juice, sweet tea, etc.  Do NOT count diet or artificially sweetened beverages)?: 0  How many minutes a day do you exercise enough to make your heart beat faster?: 9 or less  How many days a week do you exercise enough to make your heart beat faster?: 3 or less  How many days per week do you miss taking your medication?: 0

## 2021-12-03 ENCOUNTER — LAB (OUTPATIENT)
Dept: URGENT CARE | Facility: URGENT CARE | Age: 56
End: 2021-12-03
Attending: INTERNAL MEDICINE
Payer: COMMERCIAL

## 2021-12-03 DIAGNOSIS — R05.9 COUGH: ICD-10-CM

## 2021-12-03 LAB
FLUAV AG SPEC QL IA: NEGATIVE
FLUBV AG SPEC QL IA: NEGATIVE

## 2021-12-03 PROCEDURE — U0003 INFECTIOUS AGENT DETECTION BY NUCLEIC ACID (DNA OR RNA); SEVERE ACUTE RESPIRATORY SYNDROME CORONAVIRUS 2 (SARS-COV-2) (CORONAVIRUS DISEASE [COVID-19]), AMPLIFIED PROBE TECHNIQUE, MAKING USE OF HIGH THROUGHPUT TECHNOLOGIES AS DESCRIBED BY CMS-2020-01-R: HCPCS

## 2021-12-03 PROCEDURE — U0005 INFEC AGEN DETEC AMPLI PROBE: HCPCS

## 2021-12-03 PROCEDURE — 87804 INFLUENZA ASSAY W/OPTIC: CPT | Performed by: INTERNAL MEDICINE

## 2021-12-03 ASSESSMENT — PATIENT HEALTH QUESTIONNAIRE - PHQ9: SUM OF ALL RESPONSES TO PHQ QUESTIONS 1-9: 0

## 2021-12-04 LAB — SARS-COV-2 RNA RESP QL NAA+PROBE: POSITIVE

## 2021-12-06 ENCOUNTER — TELEPHONE (OUTPATIENT)
Dept: FAMILY MEDICINE | Facility: CLINIC | Age: 56
End: 2021-12-06
Payer: COMMERCIAL

## 2021-12-06 NOTE — TELEPHONE ENCOUNTER
LMTCB    Please advise patient as below from Dr. Forbes      ----- Message from Jose Forbes MD sent at 12/4/2021  9:19 AM [facility] -----  Please call patient and inform him that he has COVID-19 illness and should rest fluids arthritis strength Tylenol 2 tablets 3 times a day on a scheduled basis for 7 days plus watch his oximetry readings if his oximetry readings go into the low 90s that is 91 90-90 or less than 90 he should seek out emergency room consultation or if his symptoms worsen.  Please call patient for me today.

## 2021-12-07 ENCOUNTER — HOSPITAL ENCOUNTER (EMERGENCY)
Facility: CLINIC | Age: 56
Discharge: HOME | End: 2021-12-07
Attending: PHYSICIAN ASSISTANT | Admitting: PHYSICIAN ASSISTANT
Payer: COMMERCIAL

## 2021-12-07 ENCOUNTER — NURSE TRIAGE (OUTPATIENT)
Dept: NURSING | Facility: CLINIC | Age: 56
End: 2021-12-07
Payer: COMMERCIAL

## 2021-12-07 VITALS
DIASTOLIC BLOOD PRESSURE: 111 MMHG | TEMPERATURE: 99.8 F | RESPIRATION RATE: 18 BRPM | OXYGEN SATURATION: 97 % | SYSTOLIC BLOOD PRESSURE: 144 MMHG | HEART RATE: 107 BPM

## 2021-12-07 DIAGNOSIS — U07.1 COVID-19: ICD-10-CM

## 2021-12-07 PROCEDURE — 99282 EMERGENCY DEPT VISIT SF MDM: CPT

## 2021-12-07 ASSESSMENT — ENCOUNTER SYMPTOMS
FEVER: 1
SHORTNESS OF BREATH: 0
VOMITING: 0
COUGH: 1
FATIGUE: 1
CHILLS: 1
CHEST TIGHTNESS: 0
NAUSEA: 0
ABDOMINAL PAIN: 0

## 2021-12-07 NOTE — DISCHARGE INSTRUCTIONS
Discharge Instructions  COVID-19    COVID-19 is the disease caused by a new coronavirus. The virus spreads from person-to-person primarily by droplets when an infected person coughs or sneezes and the droplets are then breathed in by another person. There are tests available to diagnose COVID-19. You may have been diagnosed with COVID, may be being tested for COVID and have a pending test result, or may have been exposed to COVID.    Symptoms of COVID-19  Many people have no symptoms or mild symptoms.  Symptoms may usually appear 4 to 5 days (up to 14 days) after contact with a person with COVID-19. Some people will get severe symptoms and pneumonia. Usual symptoms are:     ? Fever  ? Cough  ? Trouble breathing    Less common symptoms are: Headache, body aches, sore throat, sneezing, diarrhea, loss of taste or smell.    Isolation and Quarantine    You may have been seen because you have symptoms, had an exposure, or had some other concern about possible COVID. The best way to stop the spread of the virus is to avoid contact with others.    Isolation refers to sick people staying away from people who are not sick. A person in quarantine is limiting activity because they were exposed and are waiting to see if they might become sick.    If you test positive for COVID, you should stay home (isolation) for at least 10 days after your symptoms began, and for 24 hours with no fever and improvement of symptoms--whichever is longer. (Your fever should be gone for 24 hours without using fever-reducing medicine). If you have no symptoms, you should stay home (isolation) for 10 days from the day of the test. If you have been vaccinated for COVID, the vaccination will not cause you to test positive so a positive test result generally is a  true positive .    For example, if you have a fever and cough for 6 days, you need to stay home 4 more days with no fever for a total of 10 days. Or, if you have a fever and cough for 10 days,  you need to stay home one more day with no fever for a total of 11 days.    If you have a high-risk exposure to COVID (you spent 15 minutes or more within six feet of somebody who has COVID), you should stay home (quarantine) for 14 days, unless you are vaccinated. Even if you test negative for COVID, the CDC recommends a 14-day quarantine from the time of your last exposure to that individual (unless you are vaccinated). There are options for a shortened (<14 day quarantine) you can review at:  https://www.health.[facility]./diseases/coronavirus/close.html#long    If you live in the same house as somebody with COVID and cannot separate from them, you will need to quarantine for 14-days after that person's isolation (infectious) period. That means that you may need to quarantine for 24-days after that person became symptomatic/ill.    If you are vaccinated and do not develop symptoms, you do not need to quarantine after exposure.    If you have symptoms but a negative test, you should stay at home until you are symptom-free and without fever for 24 hours, using the same judgment you would for when it is safe to return to work/school from strep throat, influenza, or the common cold. If you worsen, you should consider being re-evaluated.    If you are being tested for COVID because of symptoms and your test is pending, you should stay home until you know your test result.    If I have COVID, how should I protect myself and others?    Do not go to work or school. Have a friend or relative do your shopping. Do not use public transportation (bus, train) or ridesharing (Lyft, Uber).    Separate yourself from other people in your home. As much as possible, you should stay in one room and away from other people in your home. Also, use a separate bathroom, if possible. Avoid handling pets or other animals while sick.     Wear a facemask if you need to be around other people and cover your mouth and nose with a tissue when  you cough or sneeze.     Avoid sharing personal household items. You should not share dishes, drinking glasses, forks/knives/spoons, towels, or bedding with other people in your home. After using these items, they should be washed with soap and water. Clean parts of your home that are touched often (doorknobs, faucets, countertops, etc.) daily.     Wash your hands often with soap and water for at least 20 seconds or use an alcohol-based hand  containing at least 60% alcohol.     Avoid touching your face.    Treat your symptoms. You can take Acetaminophen (Tylenol) to treat body aches and fever as needed for comfort. Ibuprofen (Advil or Motrin) can be used as well if you still have symptoms after taking Tylenol. Drink fluids. Rest.    Watch for worsening symptoms such as shortness of breath/difficulty breathing or very severe weakness.    Employers/workplaces are being asked by the Centers for Disease Control (CDC) to not request notes/documentation for you to return to work or prove that you were ill. You may choose to show your employer this paperwork. Also, repeat testing should not be required to return to work.    Exercise/Sports in rare cases, COVID could affect your heart in a way that makes exercise or participation in sports dangerous.    If you have a mild COVID illness (fever, cough, sore throat, and similar symptoms but no difficulty breathing or abnormalities of the lung): After your COVID symptoms have resolved, wait 14-days before returning to activity.  If you have more than a mild illness (meaning that you have problems with your breathing or lungs) or if you participate in competitive or strenuous activity or have a history of heart disease: Please see your primary doctor/provider prior to return to activity/competition.    Antibody treatments are available for patients with mild to moderate COVID illness in order to prevent severe illness. In general, only patients with risk factors for  severe illness are eligible for treatment. For more information, to see if you are eligible, and to find treatment, go to the [facility]:  [email or web address]./diseases/coronavirus/mnrap.html     Return to the Emergency Department if:    If you are developing worsening breathing, shortness of breath, or feel worse you should seek medical attention.  If you are uncertain, contact your health care provider/clinic. If you need emergency medical attention, call 911 and tell them you have been ill.

## 2021-12-07 NOTE — ED TRIAGE NOTES
A&O x4, ABCs intact. Pt presents with COVID fever. Pt states that he talked to his PCP and his PCP sent him to ED because he shouldn't still have a fever. Pt was diagnosed with COVID this past Saturday. Pt taking tylenol every 4 hours and has not taken ibuprofen in the past day. Pt reports temp has been 100-100.8.

## 2021-12-07 NOTE — TELEPHONE ENCOUNTER
"Pt is caller.    Covid symptoms began 12/1/21, positive covid test 12/3/21.    Fever is persisting and worsening. Today 101.6F oral, without tylenol. Pt has been using tylenol and it does bring fever down a degree or so.     \"Lilttle\" cough, productive brownish/green or clear.    Pt has chills.     No difficulty breath.  No SOB with activity.  No sore throat.    Pt states he is getting better but has worsening fever.    Feels weak from fever, but is able to walk and stand normal.    Urinating at least every 8-12hrs. Drinking fluids well.  Watery, Diarrhea x2 yesterday. None today.  Headache is improving.     No chest pain or pressure or tightness.     Per RN Triage protocol, pt advised to be seen within 24 hours. Pt transferred to [facility] for appt, if no appt pt advised to go to [facility] to be evaluated. Caller given care advice per RN triage protocol. Caller advised to call back with questions, worsening symptoms, or new symptoms of concern. Caller verbalizes understanding and agreement of plan.    Alejandra Tamez RN   12/07/21 12:50 PM  [facility] Nurse Advisor    Reason for Disposition    [1] Fever AND [2] new-onset or worsening    [1] Fever AND [2] within 14 days of COVID-19 Exposure    Fever present > 3 days (72 hours)    Additional Information    Negative: SEVERE difficulty breathing (e.g., struggling for each breath, speaks in single words)    Negative: [1] SEVERE weakness (e.g., can't stand or can barely walk) AND [2] new-onset or WORSE    Negative: Difficult to awaken or acting confused (e.g., disoriented, slurred speech)    Negative: Bluish (or gray) lips or face now    Negative: Sounds like a life-threatening emergency to the triager    Negative: [1] Typical COVID-19 symptoms AND [2] lasting less than 3 weeks    Negative: [1] Chest pain, pressure, or tightness AND [2] new-onset or worsening    Negative: Shock suspected (e.g., cold/pale/clammy skin, too weak to stand, low BP, rapid pulse)    " Negative: Difficult to awaken or acting confused (e.g., disoriented, slurred speech)    Negative: [1] Difficulty breathing AND [2] bluish lips, tongue or face    Negative: New onset rash with multiple purple (or blood-colored) spots or dots    Negative: Sounds like a life-threatening emergency to the triager    Negative: Fever in a cancer patient who is currently (or recently) receiving chemotherapy or radiation therapy, or cancer patient who has metastatic or end-stage cancer and is receiving palliative care    Negative: Pregnant    Negative: Postpartum (from 0 to 6 weeks after delivery)    Negative: Fever onset within 24 hours of receiving vaccine    Negative: Other symptom is present, see that guideline  (e.g., symptoms of cough, runny nose, sore throat, earache, abdominal pain, diarrhea, vomiting)    Negative: [1] Headache AND [2] stiff neck (can't touch chin to chest)    Negative: IV drug abuse    Negative: Difficulty breathing    Negative: [1] Drinking very little AND [2] dehydration suspected (e.g., no urine > 12 hours, very dry mouth, very lightheaded)    Negative: Patient sounds very sick or weak to the triager  (Exception: mild weakness and hasn't taken fever medicine)    Negative: Fever > 104 F (40 C)    Negative: [1] Fever > 101 F (38.3 C) AND [2] age > 60    Negative: [1] Fever > 100.0 F (37.8 C) AND [2] bedridden (e.g., nursing home patient, CVA, chronic illness, recovering from surgery)    Negative: [1] Fever > 100.0 F (37.8 C) AND [2] indwelling urinary catheter (e.g., Monteiro, Coude)    Negative: [1] Fever > 100.0 F (37.8 C) AND [2] has port (portacath), central line, or PICC line    Negative: [1] Fever > 100.0 F (37.8 C) AND [2] diabetes mellitus or weak immune system (e.g., HIV positive, cancer chemo, splenectomy, organ transplant, chronic steroids)    Negative: [1] Fever > 100.0 F (37.8 C) AND [2] surgery in the last month    Negative: Transplant patient (e.g., kidney, liver, lung,  heart)    Protocols used: CORONAVIRUS (COVID-19) PERSISTING SYMPTOMS FOLLOW-UP CALL-A- 8.25.2021, FEVER-A-AH    COVID 19 Nurse Triage Plan/Patient Instructions    Please be aware that novel coronavirus (COVID-19) may be circulating in the community. If you develop symptoms such as fever, cough, or SOB or if you have concerns about the presence of another infection including coronavirus (COVID-19), please contact your health care provider or visit [email or web address].     Disposition/Instructions    In-Person Visit with provider recommended. Reference Visit Selection Guide.    Thank you for taking steps to prevent the spread of this virus.  o Limit your contact with others.  o Wear a simple mask to cover your cough.  o Wash your hands well and often.    Resources    [facility]: About COVID-19: [email or web address]    CDC: What to Do If You're Sick: www.cdc.gov/coronavirus/2019-ncov/about/steps-when-sick.html    CDC: Ending Home Isolation: www.cdc.gov/coronavirus/2019-ncov/hcp/disposition-in-home-patients.html     CDC: Caring for Someone: www.cdc.gov/coronavirus/2019-ncov/if-you-are-sick/care-for-someone.html     [facility]: Interim Guidance for Hospital Discharge to Home: www.health.[facility].mn.us/diseases/coronavirus/hcp/hospdischarge.pdf    [facility] clinical trials (COVID-19 research studies): [email or web address].[email or web address]-clinical-trials     Below are the COVID-19 hotlines at the [facility] ([facility]). Interpreters are available.   o For health questions: Call 715-326-6709 or 1-812.771.9024 (7 a.m. to 7 p.m.)  o For questions about schools and childcare: Call 010-316-7485 or 1-653.544.5080 (7 a.m. to 7 p.m.)

## 2021-12-07 NOTE — ED PROVIDER NOTES
History     Chief Complaint:  Fever       HPI   Jung Severino is a 56 year old male with history of high blood pressure, presents emergency room today for evaluation of a fever in the setting of COVID-19.  The patient has been battling Covid for 6 days.  His symptoms are fatigue, fever, congestion, cough, and feeling unwell.  He had a test this weekend that came back positive.  He is referred to the emergency room after talking to his primary and nurse line who said that his fever should be improving by now.  He has no worsening symptoms.  He has no chest pain, shortness of breath, hemoptysis.    ROS:  Review of Systems   Constitutional: Positive for chills, fatigue and fever.   HENT: Positive for congestion.    Respiratory: Positive for cough. Negative for chest tightness and shortness of breath.    Cardiovascular: Negative for chest pain.   Gastrointestinal: Negative for abdominal pain, nausea and vomiting.        All other systems reviewed and are negative.    Allergies:  Lisinopril  Sulfa Drugs  Tetanus [Tetanus Toxoids]     Medications:    losartan (COZAAR) 50 MG tablet  multivitamin, therapeutic with minerals (THERA-VIT-M) TABS tablet  traZODone (DESYREL) 100 MG tablet  triamcinolone (KENALOG) 0.1 % external cream        Past Medical History:    Past Medical History:   Diagnosis Date     Alcohol abuse 12/29/2016     Anxiety 12/23/2009     Benign essential hypertension 3/5/2018     C. difficile colitis 12/29/2016     [facility] hx-cardiovas dis NEC      Patient Active Problem List   Diagnosis     Family history of other cardiovascular diseases     Closed fracture of head of radius     Contusion of face, scalp, and neck except eye(s)     Injury, other and unspecified, hand, except finger     Malaise and fatigue     Anxiety     Mild major depression (H)     CARDIOVASCULAR SCREENING; LDL GOAL LESS THAN 130     Alcohol withdrawal syndrome (H)     C. difficile colitis     Alcohol abuse     Benign essential  hypertension     Diverticulitis of colon        Past Surgical History:    Past Surgical History:   Procedure Laterality Date     NOT IN USE          Family History:    family history includes Heart Disease in his father.    Social History:   reports that he has never smoked. His smokeless tobacco use includes chew. He reports previous alcohol use. He reports that he does not use drugs.  PCP: Yasmin Rodríguez     Physical Exam     Patient Vitals for the past 24 hrs:   BP Temp Pulse Resp SpO2   12/07/21 1715 (!) 144/111 -- 107 -- 97 %   12/07/21 1528 (!) 136/96 99.8  F (37.7  C) 102 18 99 %        Physical Exam  General: Well appearing, pleasant male, resting on exam bed  HEENT: No evidence of trauma.  Conjunctive are clear. Neck range of motion intact.  Nose and throat clear.  Respiratory: Good effort  Cardiovascular: Good distal perfusion  Gastrointestinal: Nondistended  Musculoskeletal: Atraumatic  Skin: Exposed skin clear.  Neurologic: Alert.  Psych:  Patient is cooperative, with normal affect.  Limited exam taken due to the patient's diagnosis.    Emergency Department Course   ECG:  None    Imaging:  No orders to display        Laboratory:  Labs Ordered and Resulted from Time of ED Arrival to Time of ED Departure - No data to display     Interventions:  Medications - No data to display     Impression & Plan      Medical Decision Making:  Jung Severino is a 56 year old male with history of hypertension, presents emergency room today for evaluation of a fever in the setting of COVID-19.  See HPI.  He is mildly tachycardic with a low-grade temperature.  He is in no acute distress.  He has no cardiopulmonary complaints aside from a cough.  He denies any worsening of his symptoms.  He is ultimately referred here from clinic because he cannot shake his fever.  I discussed that this is a normal course and that some people take even more than 7 to 10 days to improve, therefore he should continue to quarantine, use Tylenol  and ibuprofen for fever, and return with new or worsening symptoms.  He is comfortable with the plan is no further questions.  Return if worse and follow-up as needed.    Diagnosis:    ICD-10-CM    1. COVID-19  U07.1         Discharge Medications:  Discharge Medication List as of 12/7/2021  5:12 PM           12/7/2021   Brandon Martinez PA-C Cyr, Matthew R, PA-C  12/07/21 1722

## 2021-12-10 NOTE — TELEPHONE ENCOUNTER
Message was viewed on [employer]. Closing encounter   Terrell De La Cruz CMA on 12/10/2021 at 12:37 PM

## 2021-12-14 ENCOUNTER — APPOINTMENT (OUTPATIENT)
Dept: GENERAL RADIOLOGY | Facility: CLINIC | Age: 56
DRG: 177 | End: 2021-12-14
Attending: EMERGENCY MEDICINE
Payer: COMMERCIAL

## 2021-12-14 ENCOUNTER — HOSPITAL ENCOUNTER (INPATIENT)
Facility: CLINIC | Age: 56
LOS: 14 days | Discharge: HOME | DRG: 177 | End: 2021-12-28
Attending: EMERGENCY MEDICINE | Admitting: INTERNAL MEDICINE
Payer: COMMERCIAL

## 2021-12-14 ENCOUNTER — TELEPHONE (OUTPATIENT)
Dept: FAMILY MEDICINE | Facility: CLINIC | Age: 56
End: 2021-12-14

## 2021-12-14 ENCOUNTER — HOSPITAL ENCOUNTER (EMERGENCY)
Facility: CLINIC | Age: 56
Discharge: LEFT BEFORE DISCHARGE | DRG: 177 | End: 2021-12-14
Admitting: EMERGENCY MEDICINE
Payer: COMMERCIAL

## 2021-12-14 VITALS
SYSTOLIC BLOOD PRESSURE: 114 MMHG | HEART RATE: 101 BPM | OXYGEN SATURATION: 90 % | RESPIRATION RATE: 22 BRPM | DIASTOLIC BLOOD PRESSURE: 81 MMHG | TEMPERATURE: 97 F

## 2021-12-14 DIAGNOSIS — I10 ESSENTIAL HYPERTENSION: ICD-10-CM

## 2021-12-14 DIAGNOSIS — J12.82 PNEUMONIA DUE TO 2019 NOVEL CORONAVIRUS: ICD-10-CM

## 2021-12-14 DIAGNOSIS — U07.1 PNEUMONIA DUE TO 2019 NOVEL CORONAVIRUS: ICD-10-CM

## 2021-12-14 DIAGNOSIS — N17.9 AKI (ACUTE KIDNEY INJURY) (H): ICD-10-CM

## 2021-12-14 LAB
ALBUMIN SERPL-MCNC: 3.1 G/DL (ref 3.4–5)
ALBUMIN UR-MCNC: 70 MG/DL
ALP SERPL-CCNC: 69 U/L (ref 40–150)
ALT SERPL W P-5'-P-CCNC: 146 U/L (ref 0–70)
ANION GAP SERPL CALCULATED.3IONS-SCNC: 13 MMOL/L (ref 3–14)
ANION GAP SERPL CALCULATED.3IONS-SCNC: 8 MMOL/L (ref 3–14)
APPEARANCE UR: ABNORMAL
AST SERPL W P-5'-P-CCNC: 283 U/L (ref 0–45)
BACTERIA #/AREA URNS HPF: ABNORMAL /HPF
BASOPHILS # BLD AUTO: 0 10E3/UL (ref 0–0.2)
BASOPHILS # BLD AUTO: 0 10E3/UL (ref 0–0.2)
BASOPHILS NFR BLD AUTO: 0 %
BASOPHILS NFR BLD AUTO: 0 %
BILIRUB SERPL-MCNC: 0.4 MG/DL (ref 0.2–1.3)
BILIRUB UR QL STRIP: NEGATIVE
BUN SERPL-MCNC: 63 MG/DL (ref 7–30)
BUN SERPL-MCNC: 72 MG/DL (ref 7–30)
CALCIUM SERPL-MCNC: 9.1 MG/DL (ref 8.5–10.1)
CALCIUM SERPL-MCNC: 9.9 MG/DL (ref 8.5–10.1)
CHLORIDE BLD-SCNC: 104 MMOL/L (ref 94–109)
CHLORIDE BLD-SCNC: 104 MMOL/L (ref 94–109)
CO2 SERPL-SCNC: 19 MMOL/L (ref 20–32)
CO2 SERPL-SCNC: 24 MMOL/L (ref 20–32)
COLOR UR AUTO: YELLOW
CREAT SERPL-MCNC: 1.97 MG/DL (ref 0.66–1.25)
CREAT SERPL-MCNC: 2.03 MG/DL (ref 0.66–1.25)
CRP SERPL-MCNC: 97.3 MG/L (ref 0–8)
D DIMER PPP FEU-MCNC: 1.17 UG/ML FEU (ref 0–0.5)
EOSINOPHIL # BLD AUTO: 0 10E3/UL (ref 0–0.7)
EOSINOPHIL # BLD AUTO: 0 10E3/UL (ref 0–0.7)
EOSINOPHIL NFR BLD AUTO: 0 %
EOSINOPHIL NFR BLD AUTO: 0 %
ERYTHROCYTE [DISTWIDTH] IN BLOOD BY AUTOMATED COUNT: 12.7 % (ref 10–15)
ERYTHROCYTE [DISTWIDTH] IN BLOOD BY AUTOMATED COUNT: 12.9 % (ref 10–15)
GFR SERPL CREATININE-BSD FRML MDRD: 35 ML/MIN/1.73M2
GFR SERPL CREATININE-BSD FRML MDRD: 36 ML/MIN/1.73M2
GLUCOSE BLD-MCNC: 130 MG/DL (ref 70–99)
GLUCOSE BLD-MCNC: 145 MG/DL (ref 70–99)
GLUCOSE UR STRIP-MCNC: NEGATIVE MG/DL
HCT VFR BLD AUTO: 47.4 % (ref 40–53)
HCT VFR BLD AUTO: 49 % (ref 40–53)
HGB BLD-MCNC: 16.4 G/DL (ref 13.3–17.7)
HGB BLD-MCNC: 16.6 G/DL (ref 13.3–17.7)
HGB UR QL STRIP: ABNORMAL
HOLD SPECIMEN: NORMAL
HYALINE CASTS: 13 /LPF
IMM GRANULOCYTES # BLD: 0 10E3/UL
IMM GRANULOCYTES # BLD: 0.1 10E3/UL
IMM GRANULOCYTES NFR BLD: 0 %
IMM GRANULOCYTES NFR BLD: 1 %
KETONES UR STRIP-MCNC: NEGATIVE MG/DL
LEUKOCYTE ESTERASE UR QL STRIP: NEGATIVE
LYMPHOCYTES # BLD AUTO: 0.9 10E3/UL (ref 0.8–5.3)
LYMPHOCYTES # BLD AUTO: 1.4 10E3/UL (ref 0.8–5.3)
LYMPHOCYTES NFR BLD AUTO: 12 %
LYMPHOCYTES NFR BLD AUTO: 15 %
MCH RBC QN AUTO: 31.5 PG (ref 26.5–33)
MCH RBC QN AUTO: 31.6 PG (ref 26.5–33)
MCHC RBC AUTO-ENTMCNC: 33.9 G/DL (ref 31.5–36.5)
MCHC RBC AUTO-ENTMCNC: 34.6 G/DL (ref 31.5–36.5)
MCV RBC AUTO: 91 FL (ref 78–100)
MCV RBC AUTO: 93 FL (ref 78–100)
MONOCYTES # BLD AUTO: 0.6 10E3/UL (ref 0–1.3)
MONOCYTES # BLD AUTO: 1 10E3/UL (ref 0–1.3)
MONOCYTES NFR BLD AUTO: 11 %
MONOCYTES NFR BLD AUTO: 8 %
MUCOUS THREADS #/AREA URNS LPF: PRESENT /LPF
NEUTROPHILS # BLD AUTO: 6 10E3/UL (ref 1.6–8.3)
NEUTROPHILS # BLD AUTO: 6.7 10E3/UL (ref 1.6–8.3)
NEUTROPHILS NFR BLD AUTO: 74 %
NEUTROPHILS NFR BLD AUTO: 79 %
NITRATE UR QL: NEGATIVE
NRBC # BLD AUTO: 0 10E3/UL
NRBC # BLD AUTO: 0 10E3/UL
NRBC BLD AUTO-RTO: 0 /100
NRBC BLD AUTO-RTO: 0 /100
PH UR STRIP: 5.5 [PH] (ref 5–7)
PLATELET # BLD AUTO: 162 10E3/UL (ref 150–450)
PLATELET # BLD AUTO: 165 10E3/UL (ref 150–450)
POTASSIUM BLD-SCNC: 3.3 MMOL/L (ref 3.4–5.3)
POTASSIUM BLD-SCNC: 4 MMOL/L (ref 3.4–5.3)
PROT SERPL-MCNC: 8.5 G/DL (ref 6.8–8.8)
RBC # BLD AUTO: 5.19 10E6/UL (ref 4.4–5.9)
RBC # BLD AUTO: 5.27 10E6/UL (ref 4.4–5.9)
RBC URINE: 2 /HPF
SARS-COV-2 RNA RESP QL NAA+PROBE: POSITIVE
SODIUM SERPL-SCNC: 136 MMOL/L (ref 133–144)
SODIUM SERPL-SCNC: 136 MMOL/L (ref 133–144)
SP GR UR STRIP: 1.02 (ref 1–1.03)
UROBILINOGEN UR STRIP-MCNC: NORMAL MG/DL
WBC # BLD AUTO: 7.6 10E3/UL (ref 4–11)
WBC # BLD AUTO: 9.1 10E3/UL (ref 4–11)
WBC URINE: 2 /HPF

## 2021-12-14 PROCEDURE — 96375 TX/PRO/DX INJ NEW DRUG ADDON: CPT

## 2021-12-14 PROCEDURE — 96374 THER/PROPH/DIAG INJ IV PUSH: CPT

## 2021-12-14 PROCEDURE — 82040 ASSAY OF SERUM ALBUMIN: CPT | Performed by: EMERGENCY MEDICINE

## 2021-12-14 PROCEDURE — 999N000104 HC STATISTIC NO CHARGE

## 2021-12-14 PROCEDURE — 86140 C-REACTIVE PROTEIN: CPT | Performed by: PHYSICIAN ASSISTANT

## 2021-12-14 PROCEDURE — 84145 PROCALCITONIN (PCT): CPT | Performed by: PHYSICIAN ASSISTANT

## 2021-12-14 PROCEDURE — 87635 SARS-COV-2 COVID-19 AMP PRB: CPT | Performed by: EMERGENCY MEDICINE

## 2021-12-14 PROCEDURE — 99223 1ST HOSP IP/OBS HIGH 75: CPT | Mod: AI | Performed by: PHYSICIAN ASSISTANT

## 2021-12-14 PROCEDURE — 71046 X-RAY EXAM CHEST 2 VIEWS: CPT

## 2021-12-14 PROCEDURE — 80053 COMPREHEN METABOLIC PANEL: CPT | Performed by: EMERGENCY MEDICINE

## 2021-12-14 PROCEDURE — 258N000003 HC RX IP 258 OP 636: Performed by: EMERGENCY MEDICINE

## 2021-12-14 PROCEDURE — 81001 URINALYSIS AUTO W/SCOPE: CPT | Performed by: EMERGENCY MEDICINE

## 2021-12-14 PROCEDURE — 36415 COLL VENOUS BLD VENIPUNCTURE: CPT | Performed by: EMERGENCY MEDICINE

## 2021-12-14 PROCEDURE — 99285 EMERGENCY DEPT VISIT HI MDM: CPT | Mod: 25

## 2021-12-14 PROCEDURE — 85025 COMPLETE CBC W/AUTO DIFF WBC: CPT | Performed by: EMERGENCY MEDICINE

## 2021-12-14 PROCEDURE — 71046 X-RAY EXAM CHEST 2 VIEWS: CPT | Mod: 77

## 2021-12-14 PROCEDURE — 120N000001 HC R&B MED SURG/OB

## 2021-12-14 PROCEDURE — 250N000011 HC RX IP 250 OP 636: Performed by: EMERGENCY MEDICINE

## 2021-12-14 PROCEDURE — 85379 FIBRIN DEGRADATION QUANT: CPT | Performed by: PHYSICIAN ASSISTANT

## 2021-12-14 PROCEDURE — C9803 HOPD COVID-19 SPEC COLLECT: HCPCS

## 2021-12-14 PROCEDURE — 84300 ASSAY OF URINE SODIUM: CPT | Performed by: PHYSICIAN ASSISTANT

## 2021-12-14 RX ORDER — DEXAMETHASONE SODIUM PHOSPHATE 10 MG/ML
6 INJECTION, SOLUTION INTRAMUSCULAR; INTRAVENOUS ONCE
Status: COMPLETED | OUTPATIENT
Start: 2021-12-14 | End: 2021-12-14

## 2021-12-14 RX ADMIN — SODIUM CHLORIDE 1000 ML: 9 INJECTION, SOLUTION INTRAVENOUS at 19:20

## 2021-12-14 RX ADMIN — DEXAMETHASONE SODIUM PHOSPHATE 6 MG: 10 INJECTION, SOLUTION INTRAMUSCULAR; INTRAVENOUS at 19:19

## 2021-12-14 ASSESSMENT — ENCOUNTER SYMPTOMS
UNEXPECTED WEIGHT CHANGE: 1
SHORTNESS OF BREATH: 1
COUGH: 1
WEAKNESS: 1

## 2021-12-14 ASSESSMENT — ACTIVITIES OF DAILY LIVING (ADL)
ADLS_ACUITY_SCORE: 6.75
ADLS_ACUITY_SCORE: 6.75

## 2021-12-14 NOTE — LETTER
Jung Severino  [address] [id number]    Thank you for choosing [facility] today for your health care needs.     [facility] is transforming primary care  At [facility], we re dedicated to constantly improve how we serve the health care needs of our patients and communities. We re currently making changes to the way we deliver care.     Changes you ll notice include:    An emphasis on building a relationship with a primary care provider    Access to a PAL (personal advocate and liaison) to help guide you with your care needs    Appointment lengths tailored to your specific needs and greater access to a care team to help you and your provider improve and maintain your health and well-being    Improved online access to your care team    Benefits of a primary care provider  If you don t have a designated primary care provider, we encourage you to get to know our care team online and find a provider you d like to see. Most of our providers have a short video on their online provider page. Visit [email or web address] to explore our providers and locations.    Benefits of having a primary care provider include:      They get to know you - your health history, family history and goals, making it easier to make a health plan together.     You get to know them - making health-related conversations and decisions easier      Primary care doctors help you when you re sick or hurt - but also focus on keeping you healthy with preventive care and screenings.      A doctor who sees you regularly is more likely to notice changes in your health.     You ll be connected to a broad care team who partners with your provider to support you.    Patient Advocate Liaison (PAL)   To help make sure you get the right care, at the right time, we include PALs, or Patient Advocate Liaisons, as part of your care team. Your PAL will be your first line of contact. They ll advocate for your needs and help you  navigate our services, connecting you with care team members and community resources to ensure your care is well coordinated. You ll be introduced to a PAL in an upcoming visit.     Expanded care team access with tailored appointment lengths  Depending on your health care needs, you may have longer or shorter appointments and see additional care team providers - including Medication Therapy Management (MTM) pharmacists, diabetes educators, behavioral health clinicians, or social workers. At times, they may be included in your visit with your provider, or you may see them individually.     Online access to your health care records and care team  [employer] is our online tool that makes it easy to see your health care information and communicate with your care team.     [employer] allows you to:     View your health maintenance plan so you know when you re due for a preventive screening    Send secure messages to your care team    View your health history and visit summaries     Schedule appointments     Complete questionnaires and eCheck-in before appointments      Get care from your provider with an e-visit      View and pay your bill     Sign up at [employer]. Once you have an account, you also can download the mobile daxa.     Connecting to fast and convenient care  When you need fast, convenient care - consider one of the following options:       Video Visit: A convenient care option for visiting with your provider out of the comfort of your own home. Most of the things you come to the clinic to address with your provider can now be done virtually through a video. This includes your chronic medication follow up, questions or concerns you may have, and even your annual Medicare Wellness Visit.       Phone Visit: Another convenient option for follow up of common problems that may require a more in-depth discussion with your provider.       E-visit: When you need acute care quickly, or have a quick question about  your medication, an E-visit is completed through [employer] and your provider will respond within one business day.      Tracy Altamirano, Registered Nurse, PAL (Patient Advocate Liason)   [facility]   [phone]

## 2021-12-14 NOTE — TELEPHONE ENCOUNTER
Please call Nabeel, his BMP is showing acute kidney injury, and chest xray is showing developing pneumonia, I recommend going back to ER.

## 2021-12-14 NOTE — TELEPHONE ENCOUNTER
Wife of patient is calling in stating that they where in the ED today and they did lab work and a chest x-ray . Wife stated that her  has lost 20lbs in the past 2 weeks and that has made her worry.  Patient did not want to wait any longer and left and would like for provider to look at what they did at the ED and call with results and readings.    Neela Otero

## 2021-12-14 NOTE — ED TRIAGE NOTES
Unvaccinated, covid + on Dec 3rd, symptoms started Dec 1st presents with ongoing symptoms including, fever, SOB, hypoxia at home between 87 and 88%, 20lb weight loss and gen aches. 89-90% SpO2 in traige. Otherwise VSS on RA, 22 respirations.

## 2021-12-15 ENCOUNTER — APPOINTMENT (OUTPATIENT)
Dept: CT IMAGING | Facility: CLINIC | Age: 56
DRG: 177 | End: 2021-12-15
Attending: HOSPITALIST
Payer: COMMERCIAL

## 2021-12-15 LAB
ALBUMIN SERPL-MCNC: 2.6 G/DL (ref 3.4–5)
ALP SERPL-CCNC: 57 U/L (ref 40–150)
ALT SERPL W P-5'-P-CCNC: 117 U/L (ref 0–70)
ANION GAP SERPL CALCULATED.3IONS-SCNC: 7 MMOL/L (ref 3–14)
AST SERPL W P-5'-P-CCNC: 206 U/L (ref 0–45)
BILIRUB DIRECT SERPL-MCNC: 0.2 MG/DL (ref 0–0.2)
BILIRUB SERPL-MCNC: 0.5 MG/DL (ref 0.2–1.3)
BUN SERPL-MCNC: 43 MG/DL (ref 7–30)
CALCIUM SERPL-MCNC: 8.8 MG/DL (ref 8.5–10.1)
CHLORIDE BLD-SCNC: 110 MMOL/L (ref 94–109)
CO2 SERPL-SCNC: 23 MMOL/L (ref 20–32)
CREAT SERPL-MCNC: 1.23 MG/DL (ref 0.66–1.25)
CREAT UR-MCNC: 105 MG/DL
CRP SERPL-MCNC: 116 MG/L (ref 0–8)
D DIMER PPP FEU-MCNC: 1.02 UG/ML FEU (ref 0–0.5)
ERYTHROCYTE [DISTWIDTH] IN BLOOD BY AUTOMATED COUNT: 12.9 % (ref 10–15)
FRACT EXCRET NA UR+SERPL-RTO: 0.1 %
GFR SERPL CREATININE-BSD FRML MDRD: 64 ML/MIN/1.73M2
GLUCOSE BLD-MCNC: 166 MG/DL (ref 70–99)
HCT VFR BLD AUTO: 43 % (ref 40–53)
HGB BLD-MCNC: 14.1 G/DL (ref 13.3–17.7)
LACTATE SERPL-SCNC: 1.4 MMOL/L (ref 0.7–2)
MAGNESIUM SERPL-MCNC: 2.6 MG/DL (ref 1.6–2.3)
MCH RBC QN AUTO: 31.1 PG (ref 26.5–33)
MCHC RBC AUTO-ENTMCNC: 32.8 G/DL (ref 31.5–36.5)
MCV RBC AUTO: 95 FL (ref 78–100)
PLATELET # BLD AUTO: 162 10E3/UL (ref 150–450)
POTASSIUM BLD-SCNC: 4 MMOL/L (ref 3.4–5.3)
POTASSIUM BLD-SCNC: 4.6 MMOL/L (ref 3.4–5.3)
POTASSIUM BLD-SCNC: 4.6 MMOL/L (ref 3.4–5.3)
PROCALCITONIN SERPL-MCNC: 0.14 NG/ML
PROT SERPL-MCNC: 7.5 G/DL (ref 6.8–8.8)
RBC # BLD AUTO: 4.53 10E6/UL (ref 4.4–5.9)
SODIUM SERPL-SCNC: 140 MMOL/L (ref 133–144)
SODIUM UR-SCNC: 9 MMOL/L
WBC # BLD AUTO: 3.3 10E3/UL (ref 4–11)

## 2021-12-15 PROCEDURE — 85379 FIBRIN DEGRADATION QUANT: CPT | Performed by: PHYSICIAN ASSISTANT

## 2021-12-15 PROCEDURE — 250N000013 HC RX MED GY IP 250 OP 250 PS 637: Performed by: HOSPITALIST

## 2021-12-15 PROCEDURE — 82310 ASSAY OF CALCIUM: CPT | Performed by: PHYSICIAN ASSISTANT

## 2021-12-15 PROCEDURE — 83605 ASSAY OF LACTIC ACID: CPT | Performed by: HOSPITALIST

## 2021-12-15 PROCEDURE — 250N000011 HC RX IP 250 OP 636: Performed by: HOSPITALIST

## 2021-12-15 PROCEDURE — 86140 C-REACTIVE PROTEIN: CPT | Performed by: PHYSICIAN ASSISTANT

## 2021-12-15 PROCEDURE — 250N000012 HC RX MED GY IP 250 OP 636 PS 637: Performed by: PHYSICIAN ASSISTANT

## 2021-12-15 PROCEDURE — 84132 ASSAY OF SERUM POTASSIUM: CPT | Performed by: INTERNAL MEDICINE

## 2021-12-15 PROCEDURE — 250N000013 HC RX MED GY IP 250 OP 250 PS 637: Performed by: INTERNAL MEDICINE

## 2021-12-15 PROCEDURE — 83735 ASSAY OF MAGNESIUM: CPT | Performed by: PHYSICIAN ASSISTANT

## 2021-12-15 PROCEDURE — 250N000009 HC RX 250: Performed by: PHYSICIAN ASSISTANT

## 2021-12-15 PROCEDURE — C9113 INJ PANTOPRAZOLE SODIUM, VIA: HCPCS | Performed by: HOSPITALIST

## 2021-12-15 PROCEDURE — 120N000001 HC R&B MED SURG/OB

## 2021-12-15 PROCEDURE — 82248 BILIRUBIN DIRECT: CPT | Performed by: PHYSICIAN ASSISTANT

## 2021-12-15 PROCEDURE — 36415 COLL VENOUS BLD VENIPUNCTURE: CPT | Performed by: HOSPITALIST

## 2021-12-15 PROCEDURE — 99233 SBSQ HOSP IP/OBS HIGH 50: CPT | Performed by: HOSPITALIST

## 2021-12-15 PROCEDURE — 71275 CT ANGIOGRAPHY CHEST: CPT

## 2021-12-15 PROCEDURE — 258N000003 HC RX IP 258 OP 636: Performed by: PHYSICIAN ASSISTANT

## 2021-12-15 PROCEDURE — 250N000011 HC RX IP 250 OP 636: Performed by: INTERNAL MEDICINE

## 2021-12-15 PROCEDURE — XW033E5 INTRODUCTION OF REMDESIVIR ANTI-INFECTIVE INTO PERIPHERAL VEIN, PERCUTANEOUS APPROACH, NEW TECHNOLOGY GROUP 5: ICD-10-PCS | Performed by: HOSPITALIST

## 2021-12-15 PROCEDURE — 36415 COLL VENOUS BLD VENIPUNCTURE: CPT | Performed by: PHYSICIAN ASSISTANT

## 2021-12-15 PROCEDURE — 250N000013 HC RX MED GY IP 250 OP 250 PS 637: Performed by: PHYSICIAN ASSISTANT

## 2021-12-15 PROCEDURE — 258N000003 HC RX IP 258 OP 636: Performed by: HOSPITALIST

## 2021-12-15 PROCEDURE — 250N000009 HC RX 250: Performed by: INTERNAL MEDICINE

## 2021-12-15 PROCEDURE — 84132 ASSAY OF SERUM POTASSIUM: CPT | Performed by: PHYSICIAN ASSISTANT

## 2021-12-15 PROCEDURE — 250N000011 HC RX IP 250 OP 636: Performed by: PHYSICIAN ASSISTANT

## 2021-12-15 PROCEDURE — 85027 COMPLETE CBC AUTOMATED: CPT | Performed by: PHYSICIAN ASSISTANT

## 2021-12-15 RX ORDER — BENZONATATE 100 MG/1
100 CAPSULE ORAL
Status: DISCONTINUED | OUTPATIENT
Start: 2021-12-15 | End: 2021-12-28 | Stop reason: HOSPADM

## 2021-12-15 RX ORDER — IOPAMIDOL 755 MG/ML
500 INJECTION, SOLUTION INTRAVASCULAR ONCE
Status: COMPLETED | OUTPATIENT
Start: 2021-12-15 | End: 2021-12-15

## 2021-12-15 RX ORDER — CEFTRIAXONE 1 G/1
1 INJECTION, POWDER, FOR SOLUTION INTRAMUSCULAR; INTRAVENOUS EVERY 24 HOURS
Status: DISCONTINUED | OUTPATIENT
Start: 2021-12-15 | End: 2021-12-23

## 2021-12-15 RX ORDER — HYDRALAZINE HYDROCHLORIDE 20 MG/ML
10 INJECTION INTRAMUSCULAR; INTRAVENOUS EVERY 4 HOURS PRN
Status: DISCONTINUED | OUTPATIENT
Start: 2021-12-15 | End: 2021-12-28 | Stop reason: HOSPADM

## 2021-12-15 RX ORDER — PROCHLORPERAZINE MALEATE 5 MG
10 TABLET ORAL EVERY 6 HOURS PRN
Status: DISCONTINUED | OUTPATIENT
Start: 2021-12-15 | End: 2021-12-28 | Stop reason: HOSPADM

## 2021-12-15 RX ORDER — PROCHLORPERAZINE 25 MG
25 SUPPOSITORY, RECTAL RECTAL EVERY 12 HOURS PRN
Status: DISCONTINUED | OUTPATIENT
Start: 2021-12-15 | End: 2021-12-28 | Stop reason: HOSPADM

## 2021-12-15 RX ORDER — ALBUTEROL SULFATE 90 UG/1
2 AEROSOL, METERED RESPIRATORY (INHALATION) EVERY 4 HOURS PRN
Status: DISCONTINUED | OUTPATIENT
Start: 2021-12-15 | End: 2021-12-28 | Stop reason: HOSPADM

## 2021-12-15 RX ORDER — ONDANSETRON 2 MG/ML
4 INJECTION INTRAMUSCULAR; INTRAVENOUS EVERY 6 HOURS PRN
Status: DISCONTINUED | OUTPATIENT
Start: 2021-12-15 | End: 2021-12-28 | Stop reason: HOSPADM

## 2021-12-15 RX ORDER — AZITHROMYCIN 500 MG/5ML
500 INJECTION, POWDER, LYOPHILIZED, FOR SOLUTION INTRAVENOUS ONCE
Status: COMPLETED | OUTPATIENT
Start: 2021-12-15 | End: 2021-12-15

## 2021-12-15 RX ORDER — TRAZODONE HYDROCHLORIDE 100 MG/1
100 TABLET ORAL AT BEDTIME
Status: DISCONTINUED | OUTPATIENT
Start: 2021-12-15 | End: 2021-12-28 | Stop reason: HOSPADM

## 2021-12-15 RX ORDER — AMLODIPINE BESYLATE 5 MG/1
5 TABLET ORAL DAILY
Status: DISCONTINUED | OUTPATIENT
Start: 2021-12-15 | End: 2021-12-28 | Stop reason: HOSPADM

## 2021-12-15 RX ORDER — SODIUM CHLORIDE 9 MG/ML
INJECTION, SOLUTION INTRAVENOUS CONTINUOUS
Status: DISCONTINUED | OUTPATIENT
Start: 2021-12-15 | End: 2021-12-16

## 2021-12-15 RX ORDER — GUAIFENESIN AND DEXTROMETHORPHAN HYDROBROMIDE 600; 30 MG/1; MG/1
1 TABLET, EXTENDED RELEASE ORAL 2 TIMES DAILY
Status: DISCONTINUED | OUTPATIENT
Start: 2021-12-15 | End: 2021-12-28 | Stop reason: HOSPADM

## 2021-12-15 RX ORDER — AMOXICILLIN 250 MG
2 CAPSULE ORAL 2 TIMES DAILY PRN
Status: DISCONTINUED | OUTPATIENT
Start: 2021-12-15 | End: 2021-12-28 | Stop reason: HOSPADM

## 2021-12-15 RX ORDER — CODEINE PHOSPHATE AND GUAIFENESIN 10; 100 MG/5ML; MG/5ML
5-10 SOLUTION ORAL EVERY 4 HOURS PRN
Status: DISCONTINUED | OUTPATIENT
Start: 2021-12-15 | End: 2021-12-28 | Stop reason: HOSPADM

## 2021-12-15 RX ORDER — ACETAMINOPHEN 325 MG/1
975 TABLET ORAL EVERY 8 HOURS PRN
Status: DISCONTINUED | OUTPATIENT
Start: 2021-12-15 | End: 2021-12-28 | Stop reason: HOSPADM

## 2021-12-15 RX ORDER — POTASSIUM CHLORIDE 1500 MG/1
40 TABLET, EXTENDED RELEASE ORAL ONCE
Status: COMPLETED | OUTPATIENT
Start: 2021-12-15 | End: 2021-12-15

## 2021-12-15 RX ORDER — ONDANSETRON 4 MG/1
4 TABLET, ORALLY DISINTEGRATING ORAL EVERY 6 HOURS PRN
Status: DISCONTINUED | OUTPATIENT
Start: 2021-12-15 | End: 2021-12-28 | Stop reason: HOSPADM

## 2021-12-15 RX ORDER — AMOXICILLIN 250 MG
1 CAPSULE ORAL 2 TIMES DAILY PRN
Status: DISCONTINUED | OUTPATIENT
Start: 2021-12-15 | End: 2021-12-28 | Stop reason: HOSPADM

## 2021-12-15 RX ADMIN — DEXAMETHASONE 6 MG: 2 TABLET ORAL at 13:24

## 2021-12-15 RX ADMIN — SODIUM CHLORIDE 95 ML: 9 INJECTION, SOLUTION INTRAVENOUS at 12:07

## 2021-12-15 RX ADMIN — SODIUM CHLORIDE 50 ML: 9 INJECTION, SOLUTION INTRAVENOUS at 00:59

## 2021-12-15 RX ADMIN — BENZONATATE 100 MG: 100 CAPSULE, LIQUID FILLED ORAL at 13:25

## 2021-12-15 RX ADMIN — BENZONATATE 100 MG: 100 CAPSULE, LIQUID FILLED ORAL at 00:40

## 2021-12-15 RX ADMIN — GUAIFENESIN AND DEXTROMETHORPHAN HYDROBROMIDE 1 TABLET: 600; 30 TABLET, EXTENDED RELEASE ORAL at 08:29

## 2021-12-15 RX ADMIN — TRAZODONE HYDROCHLORIDE 100 MG: 100 TABLET ORAL at 21:58

## 2021-12-15 RX ADMIN — BENZONATATE 100 MG: 100 CAPSULE, LIQUID FILLED ORAL at 07:15

## 2021-12-15 RX ADMIN — BENZONATATE 100 MG: 100 CAPSULE, LIQUID FILLED ORAL at 19:34

## 2021-12-15 RX ADMIN — POTASSIUM CHLORIDE 40 MEQ: 1500 TABLET, EXTENDED RELEASE ORAL at 02:25

## 2021-12-15 RX ADMIN — GUAIFENESIN AND DEXTROMETHORPHAN HYDROBROMIDE 1 TABLET: 600; 30 TABLET, EXTENDED RELEASE ORAL at 21:58

## 2021-12-15 RX ADMIN — PANTOPRAZOLE SODIUM 40 MG: 40 INJECTION, POWDER, FOR SOLUTION INTRAVENOUS at 16:07

## 2021-12-15 RX ADMIN — ENOXAPARIN SODIUM 40 MG: 40 INJECTION SUBCUTANEOUS at 00:41

## 2021-12-15 RX ADMIN — AZITHROMYCIN MONOHYDRATE 500 MG: 500 INJECTION, POWDER, LYOPHILIZED, FOR SOLUTION INTRAVENOUS at 13:25

## 2021-12-15 RX ADMIN — TRAZODONE HYDROCHLORIDE 100 MG: 100 TABLET ORAL at 00:40

## 2021-12-15 RX ADMIN — GUAIFENESIN AND DEXTROMETHORPHAN HYDROBROMIDE 1 TABLET: 600; 30 TABLET, EXTENDED RELEASE ORAL at 00:40

## 2021-12-15 RX ADMIN — REMDESIVIR 200 MG: 100 INJECTION, POWDER, LYOPHILIZED, FOR SOLUTION INTRAVENOUS at 00:41

## 2021-12-15 RX ADMIN — AMLODIPINE BESYLATE 5 MG: 5 TABLET ORAL at 08:29

## 2021-12-15 RX ADMIN — CEFTRIAXONE 1 G: 1 INJECTION, POWDER, FOR SOLUTION INTRAMUSCULAR; INTRAVENOUS at 16:07

## 2021-12-15 RX ADMIN — REMDESIVIR 100 MG: 100 INJECTION, POWDER, LYOPHILIZED, FOR SOLUTION INTRAVENOUS at 19:35

## 2021-12-15 RX ADMIN — SODIUM CHLORIDE: 9 INJECTION, SOLUTION INTRAVENOUS at 00:41

## 2021-12-15 RX ADMIN — IOPAMIDOL 77 ML: 755 INJECTION, SOLUTION INTRAVENOUS at 12:07

## 2021-12-15 ASSESSMENT — ACTIVITIES OF DAILY LIVING (ADL)
ADLS_ACUITY_SCORE: 3.75
ADLS_ACUITY_SCORE: 3.75
ADLS_ACUITY_SCORE: 2.75
ADLS_ACUITY_SCORE: 3.75
ADLS_ACUITY_SCORE: 6.75
ADLS_ACUITY_SCORE: 3.75
ADLS_ACUITY_SCORE: 6.75
ADLS_ACUITY_SCORE: 3.75

## 2021-12-15 ASSESSMENT — MIFFLIN-ST. JEOR: SCORE: 1687.18

## 2021-12-15 NOTE — ED TRIAGE NOTES
Pt was seen in the ed earlier today shortness of breath, signed out AMA before lab results.  Pt was called back with abnormal labs.

## 2021-12-15 NOTE — PLAN OF CARE
End of Shift Summary  For vital signs and complete assessments, please see documentation flowsheets.     Pertinent assessments: A&O x4. Denies pain. LS fine crackles posteriorly, productive cough. Requiring 2 L O2. Up ad leny, urinal provided.     Major Shift Events: Uneventful.     Treatment Plan: Remdesivir, decadron, lovenox, supplemental oxygen, cough meds &  aggressive pulmo toilet, IS.

## 2021-12-15 NOTE — H&P
[facility]  Admission History and Physical Examination    NAME: Jung Severino   : 1965   MRN: [id number]     Date of Admission:  2021    Assessment & Plan   Jung Severino is a 56 year old UNVACCINATED male with a PMH significant for HTN, obesity who developed fever and chills on  and confirmed COVID positive on 12/3. He continues to have intermittent fever (up to 103) and fatigue and was seen in the ED on ; discharged as he was HD stable and not hypoxic. He returned yesterday to ED due to concerns for 20 lbs weight loss and episodes of hypoxia (upper 80's) since this weekend.  However he left ED after having only labs and CXR performed given the wait time. PCP called him today and informed him of ARF and asked him to return to the ED.     Work up in the ED today shows hypokalemia, ARF with Bun/Cr of 63/1.97 from 2.03 earlier today. WBC normal, and concentrated UA. CXR shows opacities c/w PNA.   O2 sats was 88% with activity and 92% at rest. He was started on steroids and supplemental O2 and recommended for admission.     #Acute hypoxic respiratory failure due to COVID 19  # COVID 19 pneumonitis   -  Mild hypoxia, CXR with opacities all c/w COVID 19  -  No pleuritic CP, less likely to be PE. CT imaging not ideal given TAMMI, but consider VQ scan if hypoxia worsens  -  Last LGF (100.0) 2 days ago, normal WBC so less suspicious for superimposed bacterial infection, but will check pro calcitonin given duration of his sxs   -  Cont Dexamethasone (1/10)  -  Discussed Remdesivir, pt wants to start (). LFTs elevated borderline, will follow, may need to stop Remdesvir if LFT cont to rise.  -  Cont 2L O2  -  Aggressive Pulm toilet, IS  -  Cough support with tessalon/mucinex/robitussin    #Acute renal failure   -  Suspect due to hypovolemia from poor po intake and on-going ARB use  -  FeNa and UA suggest pre-renal cause  -  Cont IVF hydration   -  Hold ARB   -  Follow labs   -   Consider renal US if Cr does not improve with IVF     #HTN  -  Losartan on hold due to TAMMI  -  Will add PRN hydralazine     Awaiting formal pharmacy reconciliation to resume home medications.     DVT Prophylaxis: subcutaneous lovenox   Code Status: Full Code  Dispo: Home in 2-3 days     Lynn Jerome PA-C    Primary Care Physician   Yasmin Rodríguez    Chief Complaint   abnl labs     History is obtained from the patient    Discussed with Dr. Mckeon in the ED, full chart review including lab work, imaging, and vital signs were reviewed.     History of Present Illness   Jung Severino is a 56 year old UNVACCINATED male with a PMH significant for HTN, obesity who developed fever and chills on 12/1 and confirmed COVID positive on 12/3. He continues to have intermittent fever (up to 103) and fatigue and was seen in the ED on 12/7; discharged as he was HD stable and not hypoxic. He returned yesterday to ED due to concerns for 20 lbs weight loss and episodes of hypoxia (upper 80's) since this weekend.  However he left ED after having only labs and CXR performed given the wait time. PCP called him today and informed him of ARF and asked him to return to the ED.     Work up in the ED today shows hypokalemia, ARF with Bun/Cr of 63/1.97 from 2.03 earlier today. WBC normal, and concentrated UA. CXR shows opacities c/w PNA.   O2 sats was 88% with activity and 92% at rest. He was started on steroids and supplemental O2 and recommended for admission.     Past Medical History    I have reviewed this patient's medical history and updated it with pertinent information if needed.   Past Medical History:   Diagnosis Date     Alcohol abuse 12/29/2016     Anxiety 12/23/2009     Benign essential hypertension 3/5/2018     C. difficile colitis 12/29/2016     [facility] hx-cardiovas dis NEC        Past Surgical History   I have reviewed this patient's surgical history and updated it with pertinent information if needed.  Past Surgical History:    Procedure Laterality Date     NOT IN USE         Prior to Admission Medications   Prior to Admission Medications   Prescriptions Last Dose Informant Patient Reported? Taking?   losartan (COZAAR) 50 MG tablet   No No   Sig: TAKE 1 TABLET(50 MG) BY MOUTH DAILY   multivitamin, therapeutic with minerals (THERA-VIT-M) TABS tablet   No No   Sig: Take 1 tablet by mouth daily   traZODone (DESYREL) 100 MG tablet   No No   Sig: Take 1 tablet (100 mg) by mouth At Bedtime   triamcinolone (KENALOG) 0.1 % external cream   No No   Sig: Apply topically 2 times daily      Facility-Administered Medications: None     Allergies   Allergies   Allergen Reactions     Lisinopril      Watery eyes.     Sulfa Drugs      Tetanus [Tetanus Toxoids] Nausea and Vomiting     High Fever x4 days       Social History   I have reviewed this patient's social history and updated it with pertinent information if needed. Jung CARMONA Amandadarryl  reports that he has never smoked. His smokeless tobacco use includes chew. He reports previous alcohol use. He reports that he does not use drugs.    Family History   I have reviewed this patient's family history and updated it with pertinent information if needed.   Family History   Problem Relation Age of Onset     Heart Disease Father      Cancer No family hx of      Diabetes No family hx of        Review of Systems   The 10 point Review of Systems is negative other than noted in the HPI or here.     Physical Exam   Temp: 97.2  F (36.2  C) Temp src: Oral BP: 122/84 Pulse: 86   Resp: 20 SpO2: 92 %      Vital Signs with Ranges  Temp:  [97  F (36.1  C)-97.2  F (36.2  C)] 97.2  F (36.2  C)  Pulse:  [] 86  Resp:  [20-22] 20  BP: (114-122)/(81-96) 122/84  SpO2:  [88 %-92 %] 92 %  0 lbs 0 oz    Constitutional: Awake, alert,  no apparent distress.  Eyes: Conjunctiva and pupils examined and normal.  HEENT: Moist mucous membranes, normal dentition.  Respiratory: Clear to auscultation bilaterally, no crackles or  wheezing.  Cardiovascular: Regular rate and rhythm, normal S1 and S2, and no murmur noted.  GI: Soft, non-distended, non-tender, bowel sounds present. No rebound tenderness or guarding.  Lymph/Hematologic: No anterior cervical or supraclavicular adenopathy.  Skin: No rashes, no cyanosis, no edema.  Musculoskeletal: No deformities noted.  No erythema or tenderness. Moving all extremities.  Neurologic: No focal deficits noted. Speech is clear. Coordination and strength grossly normal.   Psychiatric: Appropriate affect.    Data   Data reviewed today:      Imaging:   Recent Results (from the past 24 hour(s))   Chest XR,  PA & LAT    Narrative    XR CHEST 2 VW 12/14/2021 2:00 PM    HISTORY: SOB, productive cough    COMPARISON: 3/24/2020      Impression    IMPRESSION: Mild linear opacities in the left lung base could  represent developing pneumonia or linear atelectasis. No pleural  effusion or pneumothorax. Normal heart size.    EMA OVALLE MD         SYSTEM ID:  [id number]   XR Chest 2 Views    Narrative    EXAM: XR CHEST 2 VW  LOCATION: [facility]  DATE/TIME: 12/14/2021 8:37 PM    INDICATION: covid, hypoxia  COMPARISON: 12/14/2021 at 1351 hours and 07/02/2018 radiographs. 03/24/2020 CT.       Impression    IMPRESSION: No pleural fluid or pneumothorax. Heterogeneous pulmonary opacities could be seen with atypical infection such as COVID 19 pneumonia. These opacities are minimally more conspicuous than on the comparison study from earlier today. Normal size   of the heart.        Recent Labs   Lab 12/14/21  1915 12/14/21  1345   WBC 9.1 7.6   HGB 16.4 16.6   MCV 91 93    162    136   POTASSIUM 3.3* 4.0   CHLORIDE 104 104   CO2 19* 24   BUN 63* 72*   CR 1.97* 2.03*   ANIONGAP 13 8   GLADYS 9.1 9.9   * 145*   ALBUMIN 3.1*  --    PROTTOTAL 8.5  --    BILITOTAL 0.4  --    ALKPHOS 69  --    *  --    *  --            Lynn Jerome PA-C  [facility]  December 15,  2021  Securely message with the Vocera Web Console (learn more here)  Text page via Talkdesk Paging/Directory

## 2021-12-15 NOTE — PHARMACY-ADMISSION MEDICATION HISTORY
Admission medication history interview status for this patient is complete. See [facility] admission navigator for allergy information, prior to admission medications and immunization status.     Medication history interview done, indicate source(s): Patient  Medication history resources (including written lists, pill bottles, clinic record): [facility] dispense records  Pharmacy: [id number] [address] [phone]    Changes made to PTA medication list:  Added: Melatonin      Actions taken by pharmacist (provider contacted, etc):None     Additional medication history information:None    Medication reconciliation/reorder completed by provider prior to medication history?  Yes       Prior to Admission medications    Medication Sig Last Dose Taking? Auth Provider   losartan (COZAAR) 50 MG tablet TAKE 1 TABLET(50 MG) BY MOUTH DAILY 12/14/2021 at AM Yes Yasmin Rodríguez MD   melatonin 5 MG tablet Take 5 mg by mouth At Bedtime 12/13/2021 at HS Yes Unknown, Entered By History   multivitamin, therapeutic with minerals (THERA-VIT-M) TABS tablet Take 1 tablet by mouth daily 12/14/2021 at AM Yes Vianney Mas MD   traZODone (DESYREL) 100 MG tablet Take 1 tablet (100 mg) by mouth At Bedtime 12/13/2021 at HS Yes Terrell English PA-C   triamcinolone (KENALOG) 0.1 % external cream Apply topically 2 times daily Past Week at Unknown time Yes Terrell English PA-C

## 2021-12-15 NOTE — CONSULTS
"CLINICAL NUTRITION SERVICES  -  ASSESSMENT NOTE      MALNUTRITION:  % Weight Loss:  > 5% in 1 month (severe malnutrition)  % Intake:  </= 50% for >/= 5 days (severe malnutrition)  Subcutaneous Fat Loss:  Unable to observe  Muscle Loss:  Unable to observe  Fluid Retention:  None documented    Malnutrition Diagnosis: Severe malnutrition  In Context of:  Acute illness or injury        REASON FOR ASSESSMENT  Jung Severino is a 56 year old male seen by Registered Dietitian for Admission Nutrition Risk Screen for positive.    PMH of: Unvaccinated against COVID, HTN, obesity.    Admit 2/2: Acute respiratory failure, COVID+, TAMMI.    NUTRITION HISTORY  - Information obtained from patient and chart.  - Diet at home: Regular.  - Usual intakes: Meals TID.  - Barriers to PO intakes: Decreased appetite, some nausea, loss of taste/smell.  Feels he has been eating less than half of normal for the past \"4 weeks\".    - Use of oral supplements: None.  - Allergies: NKFA.      CURRENT NUTRITION ORDERS  Diet Order:     Regular    Current Intake/Tolerance:  Limited timeframe since admission.      NUTRITION FOCUSED PHYSICAL ASSESSMENT FOR DIAGNOSING MALNUTRITION)  No    Obtained from Chart/Interdisciplinary Team:  - Requiring 2 L NC  - No documentation of PI  - Stooling patterns reviewed    ANTHROPOMETRICS  Height: 5' 8\"  Weight: 196 lbs 12.8 oz  Body mass index is 29.92 kg/m .  Weight Status:  Overweight BMI 25-29.9  Weight History:  Wt Readings from Last 10 Encounters:   12/15/21 89.3 kg (196 lb 12.8 oz)   04/30/21 98 kg (216 lb 1.6 oz)   04/27/21 97.9 kg (215 lb 12.8 oz)   03/24/20 88.8 kg (195 lb 12.3 oz)   03/04/19 93 kg (205 lb)   03/05/18 96.2 kg (212 lb)   12/29/16 83 kg (183 lb)   12/24/16 81.6 kg (180 lb)   01/30/16 83.9 kg (185 lb)   05/22/15 86.2 kg (190 lb)     - No recent wts for comparison.  - Patient himself reports UBW at home was 213# and lost from this weight in the past 4 weeks.  Weighing 195# on home scale.  - 8% " wt loss in <1 month per report.    LABS  Labs reviewed:  Electrolytes  Potassium (mmol/L)   Date Value   12/15/2021 4.0   12/14/2021 3.3 (L)   12/14/2021 4.0   04/03/2020 3.8   03/24/2020 3.2 (L)   03/05/2018 3.9     Phosphorus (mg/dL)   Date Value   12/21/2016 3.0   12/18/2016 2.3 (L)   12/17/2016 3.2    Blood Glucose  Glucose (mg/dL)   Date Value   12/14/2021 130 (H)   12/14/2021 145 (H)   04/03/2020 149 (H)   03/24/2020 145 (H)   03/05/2018 110 (H)   12/22/2016 105 (H)   12/21/2016 120 (H)     Hemoglobin A1C POCT (%)   Date Value   12/12/2016 5.5    Inflammatory Markers  CRP Inflammation (mg/L)   Date Value   12/14/2021 97.3 (H)     WBC (10e9/L)   Date Value   03/24/2020 9.2   03/05/2018 5.3   12/21/2016 9.6     WBC Count (10e3/uL)   Date Value   12/14/2021 9.1   12/14/2021 7.6     Albumin (g/dL)   Date Value   12/14/2021 3.1 (L)   04/03/2020 3.4   03/24/2020 4.0   12/20/2016 2.8 (L)      Magnesium (mg/dL)   Date Value   12/15/2021 2.6 (H)   04/03/2020 2.0   03/24/2020 1.3 (L)   12/24/2016 2.2     Sodium (mmol/L)   Date Value   12/14/2021 136   12/14/2021 136   04/03/2020 137   03/24/2020 138   03/05/2018 136    Renal  Urea Nitrogen (mg/dL)   Date Value   12/14/2021 63 (H)   12/14/2021 72 (H)   04/03/2020 11   03/24/2020 18   03/05/2018 20     Creatinine (mg/dL)   Date Value   12/14/2021 1.97 (H)   12/14/2021 2.03 (H)   04/03/2020 0.91   03/24/2020 0.97   03/05/2018 0.84     Additional  Triglycerides (mg/dL)   Date Value   03/05/2018 194 (H)   12/17/2016 96   08/12/2009 117     Ketones Urine (mg/dL)   Date Value   12/14/2021 Negative   03/24/2020 Negative        B/P: 144/102, T: 98.2, P: 88, R: 18      MEDICATIONS  Medications reviewed:    remdesivir  100 mg Intravenous Q24H    And     [START ON 12/16/2021] sodium chloride 0.9%  50 mL Intravenous Q24H     amLODIPine  5 mg Oral Daily     benzonatate  100 mg Oral TID     dexamethasone  6 mg Oral Daily     dextromethorphan-guaiFENesin  1 tablet Oral BID      enoxaparin ANTICOAGULANT  40 mg Subcutaneous Q24H     traZODone  100 mg Oral At Bedtime        - MEDICATION INSTRUCTIONS -       sodium chloride 100 mL/hr at 12/15/21 0041          ASSESSED NUTRITION NEEDS PER APPROVED PRACTICE GUIDELINES:    Dosing Weight 89 kg   Estimated Energy Needs: 20-25 Kcal/Kg  Justification: maintenance  Estimated Protein Needs: 1-1.2 g pro/Kg  Justification: preservation of lean body mass  Estimated Fluid Needs: per MD      NUTRITION DIAGNOSIS:  Inadequate oral intake related to decreased appetite, nausea, and loss of taste smell w/ COVID as evidenced by meeting <50% needs w/ 8% wt loss over the past 4 weeks per report.    NUTRITION INTERVENTIONS  Recommendations / Nutrition Prescription  Diet per MD.  Encouraged high calorie/high protein self-selection, small/frequent meals as needed.    Ok for prn Ensure if skipping meals or having smaller portions.        Implementation  Nutrition education: Provided education on above.    Medical Food Supplement: As above.     Collaboration and Referral of Nutrition care: Discussed POC with team during rounds.     Nutrition Goals  Patient to consume at least 50-75% of meals or supplements TID.       MONITORING AND EVALUATION:  Progress towards goals will be monitored and evaluated per protocol and Practice Guidelines          Vanessa Nath RDN, LD  Clinical Dietitian  3rd floor/ICU: [phone]  All other floors: [phone]  Weekend/holiday: [phone]

## 2021-12-15 NOTE — ED PROVIDER NOTES
History   Chief Complaint:  Cough       HPI   Jung Severino is a 56 year old male with history of hypertension who presents with a cough. The patient states he was here earlier today and left without being seen due to wait time. He was seen by his primary and had a high creatinine. The patient had Covid a few weeks ago. He describes feeling weak for the last 2 weeks. He has had about 20 pounds of weight loss. He has used an at home oximeter which showed low O2. He has been coughing almost constantly. He has been short of breath while walking around.     Review of Systems   Constitutional: Positive for unexpected weight change.   Respiratory: Positive for cough and shortness of breath.    Neurological: Positive for weakness.   All other systems reviewed and are negative.      Allergies:  Lisinopril  Sulfa Drugs  Tetanus [Tetanus Toxoids]    Medications:  Cozaar  Desyrel    Past Medical History:     Hypertension   Anxiety  Alcohol abuse  Depression  Diverticulitis    Family History:    Father- heart disease    Social History:  Presents with his wife      Physical Exam     Patient Vitals for the past 24 hrs:   BP Temp Temp src Pulse Resp SpO2   12/14/21 1906 116/89 97.2  F (36.2  C) Oral 114 20 (!) 88 %       Physical Exam  Constitutional: Alert, attentive, GCS 15  HENT:    Nose: Nose normal.    Mouth/Throat: Oropharynx is clear, mucous membranes are moist  Eyes: EOM are normal, anicteric, conjugate gaze  CV: regular rate and rhythm; no murmurs  Chest: Tachypneic, lungs otherwise clear  GI:  non tender. No distension. No guarding or rebound.    MSK: No LE edema, no tenderness to palpation of BLE.  Neurological: Alert, attentive, moving all extremities equally.   Skin: Skin is warm and dry.     Emergency Department Course     Imaging:  XR Chest 2 Views   Final Result   IMPRESSION: No pleural fluid or pneumothorax. Heterogeneous pulmonary opacities could be seen with atypical infection such as COVID 19  pneumonia. These opacities are minimally more conspicuous than on the comparison study from earlier today. Normal size    of the heart.         Report per radiology    Laboratory:  Labs Ordered and Resulted from Time of ED Arrival to Time of ED Departure   COMPREHENSIVE METABOLIC PANEL - Abnormal       Result Value    Sodium 136      Potassium 3.3 (*)     Chloride 104      Carbon Dioxide (CO2) 19 (*)     Anion Gap 13      Urea Nitrogen 63 (*)     Creatinine 1.97 (*)     Calcium 9.1      Glucose 130 (*)     Alkaline Phosphatase 69       (*)      (*)     Protein Total 8.5      Albumin 3.1 (*)     Bilirubin Total 0.4      GFR Estimate 36 (*)    ROUTINE UA WITH MICROSCOPIC - Abnormal    Color Urine Yellow      Appearance Urine Slightly Cloudy (*)     Glucose Urine Negative      Bilirubin Urine Negative      Ketones Urine Negative      Specific Gravity Urine 1.019      Blood Urine Large (*)     pH Urine 5.5      Protein Albumin Urine 70  (*)     Urobilinogen Urine Normal      Nitrite Urine Negative      Leukocyte Esterase Urine Negative      Bacteria Urine Few (*)     Mucus Urine Present (*)     RBC Urine 2      WBC Urine 2      Hyaline Casts Urine 13 (*)    COVID-19 VIRUS (CORONAVIRUS) BY PCR - Abnormal    SARS CoV2 PCR Positive (*)    CRP INFLAMMATION - Abnormal    CRP Inflammation 97.3 (*)    D DIMER QUANTITATIVE - Abnormal    D-Dimer Quantitative 1.17 (*)    CBC WITH PLATELETS AND DIFFERENTIAL    WBC Count 9.1      RBC Count 5.19      Hemoglobin 16.4      Hematocrit 47.4      MCV 91      MCH 31.6      MCHC 34.6      RDW 12.7      Platelet Count 165      % Neutrophils 74      % Lymphocytes 15      % Monocytes 11      % Eosinophils 0      % Basophils 0      % Immature Granulocytes 0      NRBCs per 100 WBC 0      Absolute Neutrophils 6.7      Absolute Lymphocytes 1.4      Absolute Monocytes 1.0      Absolute Eosinophils 0.0      Absolute Basophils 0.0      Absolute Immature Granulocytes 0.0      Absolute  NRBCs 0.0     PROCALCITONIN      Emergency Department Course:    Reviewed:  I reviewed nursing notes, vitals, past medical history and Care Everywhere    Assessments:   I obtained history and examined the patient as noted above.    I rechecked the patient and explained findings.     Consults:   I spoke with Jerome accepting for Dr. Richardson.     Interventions:  : Decadron 6 mg IV   : NS 1L IV Bolus      Disposition:  The patient was admitted to the hospital under the care of Dr. Richardson.     Impression & Plan     Medical Decision Makin-year-old male who is unvaccinated against Covid presenting for evaluation of gradually increasing shortness of breath with positive test 2 weeks ago as well as elevated creatinine.  He was in the ER earlier today however left without being seen however have a had screening blood drawn and his PCP noted his creatinine recommended to come back.  Creatinine is up to 2, baseline is closer to 0.9, he does report last oral intake.  He denies any flank pain, urinary symptoms or abdominal pain.  I suspect likely prerenal TAMMI in the setting of Covid and dehydration.  He was given IV fluids, oxygen sats were noted to be 88% with mild exertion consistent with likely Covid pneumonia.  Chest x-ray is consistent with this.  Given gradual onset of symptoms I do not suspect PE.  He was given IV fluids and a single dose of Decadron, will plan for medicine admission for continued treatment of acute hypoxic respiratory failure secondary to Covid pneumonia and TAMMI, likely secondary to dehydration.     Diagnosis:    ICD-10-CM    1. Pneumonia due to 2019 novel coronavirus  U07.1     J12.82    2. TAMMI (acute kidney injury) (H)  N17.9      Arron Hays MD  [employer]  9:45 PM 21       Scribe Disclosure:  I, Rukhsana Toro, am serving as a scribe at 9:12 PM on 2021 to document services personally performed by Arron Hays MD  based on my observations and the provider's statements to me.            Arron Hays MD  12/14/21 [id number]

## 2021-12-15 NOTE — ED NOTES
[facility]  ED Nurse Handoff Report    Jung Severino is a 56 year old male   ED Chief complaint: Cough  . ED Diagnosis:   Final diagnoses:   Pneumonia due to 2019 novel coronavirus   TAMMI (acute kidney injury) (H)     Allergies:   Allergies   Allergen Reactions     Lisinopril      Watery eyes.     Sulfa Drugs      Tetanus [Tetanus Toxoids] Nausea and Vomiting     High Fever x4 days       Code Status: Full Code  Activity level - Baseline/Home:  Independent. Activity Level - Current:   Stand by Assist. Lift room needed: No. Bariatric: No   Needed: No   Isolation: Yes. Infection: Not Applicable  COVID r/o and special precautions.     Vital Signs:   Vitals:    12/14/21 1906 12/14/21 2145 12/14/21 2200   BP: 116/89 (!) 121/96 122/84   Pulse: 114 94 86   Resp: 20     Temp: 97.2  F (36.2  C)     TempSrc: Oral     SpO2: (!) 88% 90% 92%       Cardiac Rhythm:  ,      Pain level:    Patient confused: No. Patient Falls Risk: Yes.   Elimination Status: Has voided   Patient Report - Initial Complaint: Cough. Focused Assessment:    Pt was seen in the ed earlier today shortness of breath, signed out AMA before lab results.  Pt was called back with abnormal labs.        Tests Performed: labs, ekg, chest xray. Abnormal Results:   Labs Ordered and Resulted from Time of ED Arrival to Time of ED Departure   COMPREHENSIVE METABOLIC PANEL - Abnormal       Result Value    Sodium 136      Potassium 3.3 (*)     Chloride 104      Carbon Dioxide (CO2) 19 (*)     Anion Gap 13      Urea Nitrogen 63 (*)     Creatinine 1.97 (*)     Calcium 9.1      Glucose 130 (*)     Alkaline Phosphatase 69       (*)      (*)     Protein Total 8.5      Albumin 3.1 (*)     Bilirubin Total 0.4      GFR Estimate 36 (*)    ROUTINE UA WITH MICROSCOPIC - Abnormal    Color Urine Yellow      Appearance Urine Slightly Cloudy (*)     Glucose Urine Negative      Bilirubin Urine Negative      Ketones Urine Negative      Specific  Gravity Urine 1.019      Blood Urine Large (*)     pH Urine 5.5      Protein Albumin Urine 70  (*)     Urobilinogen Urine Normal      Nitrite Urine Negative      Leukocyte Esterase Urine Negative      Bacteria Urine Few (*)     Mucus Urine Present (*)     RBC Urine 2      WBC Urine 2      Hyaline Casts Urine 13 (*)    COVID-19 VIRUS (CORONAVIRUS) BY PCR - Abnormal    SARS CoV2 PCR Positive (*)    CRP INFLAMMATION - Abnormal    CRP Inflammation 97.3 (*)    D DIMER QUANTITATIVE - Abnormal    D-Dimer Quantitative 1.17 (*)    CBC WITH PLATELETS AND DIFFERENTIAL    WBC Count 9.1      RBC Count 5.19      Hemoglobin 16.4      Hematocrit 47.4      MCV 91      MCH 31.6      MCHC 34.6      RDW 12.7      Platelet Count 165      % Neutrophils 74      % Lymphocytes 15      % Monocytes 11      % Eosinophils 0      % Basophils 0      % Immature Granulocytes 0      NRBCs per 100 WBC 0      Absolute Neutrophils 6.7      Absolute Lymphocytes 1.4      Absolute Monocytes 1.0      Absolute Eosinophils 0.0      Absolute Basophils 0.0      Absolute Immature Granulocytes 0.0      Absolute NRBCs 0.0     PROCALCITONIN     XR Chest 2 Views   Final Result   IMPRESSION: No pleural fluid or pneumothorax. Heterogeneous pulmonary opacities could be seen with atypical infection such as COVID 19 pneumonia. These opacities are minimally more conspicuous than on the comparison study from earlier today. Normal size    of the heart.         .   Treatments provided:  VS monitoring  Family Comments: wife was at bedside  OBS brochure/video discussed/provided to patient:  N/A  ED Medications:   Medications   dexamethasone PF (DECADRON) injection 6 mg (6 mg Intravenous Given 12/14/21 1919)   0.9% sodium chloride BOLUS (1,000 mLs Intravenous New Bag 12/14/21 1920)     Drips infusing:  No  For the majority of the shift, the patient's behavior Green. Interventions performed were NA.    Sepsis treatment initiated: No     Patient tested for COVID 19 prior to  admission: YES    ED Nurse Name/Phone Number: Aleks Boone RN,   10:33 PM  RECEIVING UNIT ED HANDOFF REVIEW    Above ED Nurse Handoff Report was reviewed: Yes  Reviewed by: Katelin Perez RN on December 14, 2021 at 11:35 PM

## 2021-12-15 NOTE — CONSULTS
Care Management  Note    Pt identified as a Service Bundle #3.  He follows with [facility] FERRARO. No needs or assessment needed at this time. Please consult CM/SW  if discharge needs should arise. I will schedule him with f/u for post hospitalization.    Naida Kaiser RN BSN   Inpatient Care Coordination  [facility]  [phone]    Karen Kaiser, RN

## 2021-12-15 NOTE — TELEPHONE ENCOUNTER
RN spoke to patient and advised of message below from Dr. Rodríguez     Explained lab results - wife will drive to ER     Advised patient that we are unable to manage this outpatient     Tracy Altamirano, Registered Nurse, PAL (Patient Advocate Liason)   [facility]   [phone]

## 2021-12-15 NOTE — PROGRESS NOTES
[facility]    Hospitalist Progress Note             Date of Admission:  12/14/2021                   Day of hospitalization: 1    Assessment and Plan:      Jung Severino is a 56 year old UNVACCINATED male with a PMH significant for HTN, obesity who developed fever and chills on 12/1 and confirmed COVID positive on 12/3. He continues to have intermittent fever (up to 103) and fatigue and was seen in the ED on 12/7; discharged as he was HD stable and not hypoxic. He returned yesterday to ED due to concerns for 20 lbs weight loss and episodes of hypoxia (upper 80's) since this weekend.  However he left ED after having only labs and CXR performed given the wait time. PCP called him today and informed him of ARF and asked him to return to the ED.      Work up in the ED today shows hypokalemia, ARF with Bun/Cr of 63/1.97 from 2.03 earlier today. WBC normal, and concentrated UA. CXR shows opacities c/w PNA.   O2 sats was 88% with activity and 92% at rest. He was started on steroids and supplemental O2 and recommended for admission.      #Acute hypoxic respiratory failure due to COVID 19  # COVID 19 pneumonitis   -  hypoxia worse today, CXR with opacities all c/w COVID 19  - in the setting of worsening hypoxia, and elevated D-dimer will order CT Pulmonary Embolism as kidney function improved today  -  Pro calcitonin is .014, in the setting of progressive hypoxia will start empiric antibiotics, ceftriaxone and azithromycin   -  Cont Dexamethasone (1/10)  -  Discussed Remdesivir, pt wants to start (1/5). LFTs elevated borderline, will follow, may need to stop Remdesvir if LFT cont to rise.  -  Cont 2L O2  -  Aggressive Pulm toilet, IS  -  Cough support with tessalon/mucinex/robitussin     #Acute renal failure   -Resolved most likely prerenal, continue IV fluids another 24 hours.  Gentle hydration in the setting of Covid pneumonitis     #HTN  -  Losartan on hold due to TAMMI  -  Will add PRN  "hydralazine         # Code status:  Full   # Anticipated discharge date and Disposition: 1-2 days  # DVT: Lovenox   # IVF: Normal saline at 100 ml/hour                    Iris Mchugh MD  Text Page (7am - 6pm, M-F)               Subjective   Chief Complaint: sob  Subjective: fells ok, denies MORATAYA, wants to go home. Requesting to be discharged as he has to go to work.  Positive cough, no nausea vomiting abdominal pain       Objective   BP (!) 144/102 (BP Location: Left arm)   Pulse 88   Temp 98.2  F (36.8  C) (Oral)   Resp 18   Ht 1.727 m (5' 8\")   Wt 89.3 kg (196 lb 12.8 oz)   SpO2 92%   BMI 29.92 kg/m       Physical Exam  General: Pt in NAD, normal appearance  HEENT: OP clear MMM, no JVD  Lungs: Bibasilar crackles, normal breathing  without accessory muscle usage, no wheezing, rhonchi or crackles  Cardiac: +S1, S2, RRR, no MRG, no edema  Abdominal: normal bowel sounds, NT/ND, no hepatosplenomegaly  Skin: warm, dry, normal turgor, no rash  Psyche: A& O x3, appropriate affect             Intake/Output Summary (Last 24 hours) at 12/15/2021 1204  Last data filed at 12/15/2021 0500  Gross per 24 hour   Intake --   Output 400 ml   Net -400 ml           Labs and Imaging Results:      Recent Labs   Lab 12/15/21  0909 12/14/21 1915   WBC 3.3* 9.1   HGB 14.1 16.4    165        Recent Labs   Lab 12/15/21  0909 12/14/21 1915    136   CO2 23 19*   BUN 43* 63*      No results for input(s): INR, PTT in the last 168 hours.   No results for input(s): CKMB in the last 168 hours.    Invalid input(s): TROPONINT     Recent Labs   Lab 12/15/21  0909 12/14/21 1915   ALBUMIN 2.6* 3.1*   * 283*   * 146*   ALKPHOS 57 69        Micro:     Radio:  XR Chest 2 Views   Final Result   IMPRESSION: No pleural fluid or pneumothorax. Heterogeneous pulmonary opacities could be seen with atypical infection such as COVID 19 pneumonia. These opacities are minimally more conspicuous than on the comparison study from " earlier today. Normal size    of the heart.       CT Chest Pulmonary Embolism w Contrast    (Results Pending)           Medications:      Scheduled Meds:      remdesivir  100 mg Intravenous Q24H    And     [START ON 12/16/2021] sodium chloride 0.9%  50 mL Intravenous Q24H     amLODIPine  5 mg Oral Daily     benzonatate  100 mg Oral TID     dexamethasone  6 mg Oral Daily     dextromethorphan-guaiFENesin  1 tablet Oral BID     enoxaparin ANTICOAGULANT  40 mg Subcutaneous Q24H     traZODone  100 mg Oral At Bedtime     Continuous Infusions:      - MEDICATION INSTRUCTIONS -       sodium chloride 100 mL/hr at 12/15/21 0041     PRN Meds:  acetaminophen, albuterol, guaiFENesin-codeine, hydrALAZINE, - MEDICATION INSTRUCTIONS -, ondansetron **OR** ondansetron, prochlorperazine **OR** prochlorperazine **OR** prochlorperazine, senna-docusate **OR** senna-docusate

## 2021-12-16 LAB
ANION GAP SERPL CALCULATED.3IONS-SCNC: 7 MMOL/L (ref 3–14)
BUN SERPL-MCNC: 28 MG/DL (ref 7–30)
CALCIUM SERPL-MCNC: 8.3 MG/DL (ref 8.5–10.1)
CHLORIDE BLD-SCNC: 114 MMOL/L (ref 94–109)
CO2 SERPL-SCNC: 20 MMOL/L (ref 20–32)
CREAT SERPL-MCNC: 0.88 MG/DL (ref 0.66–1.25)
CRP SERPL-MCNC: 64.7 MG/L (ref 0–8)
D DIMER PPP FEU-MCNC: 2.07 UG/ML FEU (ref 0–0.5)
ERYTHROCYTE [DISTWIDTH] IN BLOOD BY AUTOMATED COUNT: 13 % (ref 10–15)
GFR SERPL CREATININE-BSD FRML MDRD: >90 ML/MIN/1.73M2
GLUCOSE BLD-MCNC: 150 MG/DL (ref 70–99)
HCT VFR BLD AUTO: 42.6 % (ref 40–53)
HGB BLD-MCNC: 13.8 G/DL (ref 13.3–17.7)
MCH RBC QN AUTO: 31.2 PG (ref 26.5–33)
MCHC RBC AUTO-ENTMCNC: 32.4 G/DL (ref 31.5–36.5)
MCV RBC AUTO: 96 FL (ref 78–100)
PLATELET # BLD AUTO: 189 10E3/UL (ref 150–450)
POTASSIUM BLD-SCNC: 4.2 MMOL/L (ref 3.4–5.3)
RBC # BLD AUTO: 4.42 10E6/UL (ref 4.4–5.9)
SODIUM SERPL-SCNC: 141 MMOL/L (ref 133–144)
WBC # BLD AUTO: 6 10E3/UL (ref 4–11)

## 2021-12-16 PROCEDURE — 36415 COLL VENOUS BLD VENIPUNCTURE: CPT | Performed by: PHYSICIAN ASSISTANT

## 2021-12-16 PROCEDURE — C9113 INJ PANTOPRAZOLE SODIUM, VIA: HCPCS | Performed by: HOSPITALIST

## 2021-12-16 PROCEDURE — 86140 C-REACTIVE PROTEIN: CPT | Performed by: PHYSICIAN ASSISTANT

## 2021-12-16 PROCEDURE — 80048 BASIC METABOLIC PNL TOTAL CA: CPT | Performed by: PHYSICIAN ASSISTANT

## 2021-12-16 PROCEDURE — 258N000003 HC RX IP 258 OP 636: Performed by: PHYSICIAN ASSISTANT

## 2021-12-16 PROCEDURE — 85379 FIBRIN DEGRADATION QUANT: CPT | Performed by: PHYSICIAN ASSISTANT

## 2021-12-16 PROCEDURE — 250N000013 HC RX MED GY IP 250 OP 250 PS 637: Performed by: HOSPITALIST

## 2021-12-16 PROCEDURE — 250N000012 HC RX MED GY IP 250 OP 636 PS 637: Performed by: INTERNAL MEDICINE

## 2021-12-16 PROCEDURE — 250N000011 HC RX IP 250 OP 636: Performed by: HOSPITALIST

## 2021-12-16 PROCEDURE — 250N000011 HC RX IP 250 OP 636: Performed by: PHYSICIAN ASSISTANT

## 2021-12-16 PROCEDURE — 85027 COMPLETE CBC AUTOMATED: CPT | Performed by: PHYSICIAN ASSISTANT

## 2021-12-16 PROCEDURE — 99233 SBSQ HOSP IP/OBS HIGH 50: CPT | Performed by: HOSPITALIST

## 2021-12-16 PROCEDURE — 120N000001 HC R&B MED SURG/OB

## 2021-12-16 PROCEDURE — 250N000009 HC RX 250: Performed by: PHYSICIAN ASSISTANT

## 2021-12-16 PROCEDURE — 250N000013 HC RX MED GY IP 250 OP 250 PS 637: Performed by: PHYSICIAN ASSISTANT

## 2021-12-16 RX ORDER — LOSARTAN POTASSIUM 50 MG/1
50 TABLET ORAL DAILY
Status: DISCONTINUED | OUTPATIENT
Start: 2021-12-16 | End: 2021-12-28 | Stop reason: HOSPADM

## 2021-12-16 RX ADMIN — BENZONATATE 100 MG: 100 CAPSULE, LIQUID FILLED ORAL at 13:48

## 2021-12-16 RX ADMIN — ENOXAPARIN SODIUM 40 MG: 40 INJECTION SUBCUTANEOUS at 00:55

## 2021-12-16 RX ADMIN — GUAIFENESIN AND CODEINE PHOSPHATE 10 ML: 10; 100 LIQUID ORAL at 08:44

## 2021-12-16 RX ADMIN — BENZONATATE 100 MG: 100 CAPSULE, LIQUID FILLED ORAL at 19:03

## 2021-12-16 RX ADMIN — ENOXAPARIN SODIUM 40 MG: 40 INJECTION SUBCUTANEOUS at 21:04

## 2021-12-16 RX ADMIN — SODIUM CHLORIDE, PRESERVATIVE FREE 50 ML: 5 INJECTION INTRAVENOUS at 19:04

## 2021-12-16 RX ADMIN — GUAIFENESIN AND DEXTROMETHORPHAN HYDROBROMIDE 1 TABLET: 600; 30 TABLET, EXTENDED RELEASE ORAL at 21:04

## 2021-12-16 RX ADMIN — GUAIFENESIN AND DEXTROMETHORPHAN HYDROBROMIDE 1 TABLET: 600; 30 TABLET, EXTENDED RELEASE ORAL at 08:44

## 2021-12-16 RX ADMIN — REMDESIVIR 100 MG: 100 INJECTION, POWDER, LYOPHILIZED, FOR SOLUTION INTRAVENOUS at 19:03

## 2021-12-16 RX ADMIN — CEFTRIAXONE 1 G: 1 INJECTION, POWDER, FOR SOLUTION INTRAMUSCULAR; INTRAVENOUS at 13:48

## 2021-12-16 RX ADMIN — PANTOPRAZOLE SODIUM 40 MG: 40 INJECTION, POWDER, FOR SOLUTION INTRAVENOUS at 08:45

## 2021-12-16 RX ADMIN — LOSARTAN POTASSIUM 50 MG: 50 TABLET, FILM COATED ORAL at 13:49

## 2021-12-16 RX ADMIN — AMLODIPINE BESYLATE 5 MG: 5 TABLET ORAL at 08:44

## 2021-12-16 RX ADMIN — BENZONATATE 100 MG: 100 CAPSULE, LIQUID FILLED ORAL at 08:44

## 2021-12-16 RX ADMIN — GUAIFENESIN AND CODEINE PHOSPHATE 10 ML: 10; 100 LIQUID ORAL at 13:49

## 2021-12-16 RX ADMIN — DEXAMETHASONE 6 MG: 2 TABLET ORAL at 13:52

## 2021-12-16 RX ADMIN — TRAZODONE HYDROCHLORIDE 100 MG: 100 TABLET ORAL at 21:04

## 2021-12-16 ASSESSMENT — ACTIVITIES OF DAILY LIVING (ADL)
ADLS_ACUITY_SCORE: 3.75

## 2021-12-16 NOTE — PROGRESS NOTES
[facility]    Hospitalist Progress Note             Date of Admission:  12/14/2021                   Day of hospitalization: 2    Assessment and Plan:      Jung Severino is a 56 year old UNVACCINATED male with a PMH significant for HTN, obesity who developed fever and chills on 12/1 and confirmed COVID positive on 12/3. He continues to have intermittent fever (up to 103) and fatigue and was seen in the ED on 12/7; discharged as he was HD stable and not hypoxic. He returned yesterday to ED due to concerns for 20 lbs weight loss and episodes of hypoxia (upper 80's) since this weekend.  However he left ED after having only labs and CXR performed given the wait time. PCP called him today and informed him of ARF and asked him to return to the ED.      Work up in the ED today shows hypokalemia, ARF with Bun/Cr of 63/1.97 from 2.03 earlier today. WBC normal, and concentrated UA. CXR shows opacities c/w PNA.   O2 sats was 88% with activity and 92% at rest. He was started on steroids and supplemental O2 and recommended for admission.      #Acute hypoxic respiratory failure due to COVID 19  # COVID 19 pneumonitis   -  hypoxia worse today; however, his CRP is improving  -CT angiogram ordered in the setting of his worsening hypoxia which was negative for PE but did show diffuse bilateral infiltrates.  -  Pro calcitonin is .014, in the setting of progressive hypoxia he was started on empiric antibiotics, ceftriaxone and azithromycin   -  Cont Dexamethasone (1/10)  -  Discussed Remdesivir, pt wants to start (1/5). LFTs elevated borderline, will follow, seem to be improving, may need to stop Remdesvir if LFT cont to rise.  -  Aggressive Pulm toilet, IS  -  Cough support with tessalon/mucinex/robitussin     #Acute renal failure   -Resolved most likely prerenal, stopped IVF    #HTN  -  Losartan has been restarted was held previously in the setting of his TAMMI.  -  Will add PRN hydralazine         # Code  "status:  Full   # Anticipated discharge date and Disposition: 1-2 days  # DVT: Lovenox   # IVF: Normal saline at 100 ml/hour                    Iris Mchugh MD  Text Page (7am - 6pm, M-F)               Subjective   Chief Complaint: sob  Subjective: fells ok, denies MORATAYA, wants to go home. Requesting to be discharged as he has to go to work.  Positive cough, no nausea vomiting abdominal pain       Objective   BP (!) 160/109 (BP Location: Right arm)   Pulse 87   Temp 98.4  F (36.9  C) (Oral)   Resp 20   Ht 1.727 m (5' 8\")   Wt 89.3 kg (196 lb 12.8 oz)   SpO2 95%   BMI 29.92 kg/m       Physical Exam  General: Pt in NAD, normal appearance  HEENT: OP clear MMM, no JVD  Lungs: Bibasilar crackles, normal breathing  without accessory muscle usage, no wheezing, rhonchi or crackles  Cardiac: +S1, S2, RRR, no MRG, no edema  Abdominal: normal bowel sounds, NT/ND, no hepatosplenomegaly  Skin: warm, dry, normal turgor, no rash  Psyche: A& O x3, appropriate affect             Intake/Output Summary (Last 24 hours) at 12/15/2021 1204  Last data filed at 12/15/2021 0500  Gross per 24 hour   Intake --   Output 400 ml   Net -400 ml           Labs and Imaging Results:      Recent Labs   Lab 12/16/21  0642 12/15/21  0909   WBC 6.0 3.3*   HGB 13.8 14.1    162        Recent Labs   Lab 12/16/21  0642 12/15/21  0909    140   CO2 20 23   BUN 28 43*      No results for input(s): INR, PTT in the last 168 hours.   No results for input(s): CKMB in the last 168 hours.    Invalid input(s): TROPONINT     Recent Labs   Lab 12/15/21  0909 12/14/21  1915   ALBUMIN 2.6* 3.1*   * 283*   * 146*   ALKPHOS 57 69        Micro:     Radio:  CT Chest Pulmonary Embolism w Contrast   Final Result   IMPRESSION:   1.  No evidence of pulmonary embolism.   2.  Ground-glass and interstitial opacities with a typical appearance   of COVID-19 are present.      MELONIE BLANTON MD            SYSTEM ID:  [id number]      XR Chest 2 Views "   Final Result   IMPRESSION: No pleural fluid or pneumothorax. Heterogeneous pulmonary opacities could be seen with atypical infection such as COVID 19 pneumonia. These opacities are minimally more conspicuous than on the comparison study from earlier today. Normal size    of the heart.               Medications:      Scheduled Meds:      remdesivir  100 mg Intravenous Q24H    And     sodium chloride 0.9%  50 mL Intravenous Q24H     amLODIPine  5 mg Oral Daily     benzonatate  100 mg Oral TID     cefTRIAXone  1 g Intravenous Q24H     dexamethasone  6 mg Oral Daily     dextromethorphan-guaiFENesin  1 tablet Oral BID     enoxaparin ANTICOAGULANT  40 mg Subcutaneous Q24H     pantoprazole (PROTONIX) IV  40 mg Intravenous Daily with breakfast     traZODone  100 mg Oral At Bedtime     Continuous Infusions:      - MEDICATION INSTRUCTIONS -       sodium chloride 50 mL/hr at 12/15/21 1936     PRN Meds:  acetaminophen, albuterol, guaiFENesin-codeine, hydrALAZINE, - MEDICATION INSTRUCTIONS -, ondansetron **OR** ondansetron, prochlorperazine **OR** prochlorperazine **OR** prochlorperazine, senna-docusate **OR** senna-docusate

## 2021-12-16 NOTE — PLAN OF CARE
To Do:  End of Shift Summary  For vital signs and complete assessments, please see documentation flowsheets.     Pertinent assessments: A&O x4. Denies pain. Frequent productive cough. Requiring 4 LPM O2. Up ad leny, urinal provided.     Major Shift Events: Started on IV zithromax and rocephin. Chest CT NEG.     Treatment Plan: Remdesivir, decadron, lovenox, supplemental oxygen, cough meds &  aggressive pulmo toilet, IS.     Bedside Nurse: Sylvia Ortega RN

## 2021-12-16 NOTE — PLAN OF CARE
End of Shift Summary  For vital signs and complete assessments, please see documentation flowsheets.     Pertinent assessments: A&O x4. Face & neck flushed. Denies pain. BP's elevated. LS fine crackles posteriorly, productive cough. Pulmonary hygiene/IS encouraged. Up ad leny, urinal provided.      Major Shift Events: Oxygen needs increased, now on 7 L via oxymask.     Treatment Plan: Remdesivir, decadron, lovenox, supplemental oxygen, cough meds &  aggressive pulmo toilet, IS.

## 2021-12-17 LAB
ALBUMIN SERPL-MCNC: 2.6 G/DL (ref 3.4–5)
ALP SERPL-CCNC: 61 U/L (ref 40–150)
ALT SERPL W P-5'-P-CCNC: 115 U/L (ref 0–70)
AST SERPL W P-5'-P-CCNC: 90 U/L (ref 0–45)
BILIRUB DIRECT SERPL-MCNC: 0.2 MG/DL (ref 0–0.2)
BILIRUB SERPL-MCNC: 0.7 MG/DL (ref 0.2–1.3)
CRP SERPL-MCNC: 34.4 MG/L (ref 0–8)
D DIMER PPP FEU-MCNC: 1.6 UG/ML FEU (ref 0–0.5)
ERYTHROCYTE [DISTWIDTH] IN BLOOD BY AUTOMATED COUNT: 13 % (ref 10–15)
HCT VFR BLD AUTO: 44.1 % (ref 40–53)
HGB BLD-MCNC: 14.4 G/DL (ref 13.3–17.7)
MAGNESIUM SERPL-MCNC: 1.7 MG/DL (ref 1.6–2.3)
MCH RBC QN AUTO: 31.2 PG (ref 26.5–33)
MCHC RBC AUTO-ENTMCNC: 32.7 G/DL (ref 31.5–36.5)
MCV RBC AUTO: 96 FL (ref 78–100)
PLATELET # BLD AUTO: 238 10E3/UL (ref 150–450)
POTASSIUM BLD-SCNC: 4.4 MMOL/L (ref 3.4–5.3)
PROT SERPL-MCNC: 7.4 G/DL (ref 6.8–8.8)
RBC # BLD AUTO: 4.61 10E6/UL (ref 4.4–5.9)
WBC # BLD AUTO: 9.4 10E3/UL (ref 4–11)

## 2021-12-17 PROCEDURE — 250N000013 HC RX MED GY IP 250 OP 250 PS 637: Performed by: PHYSICIAN ASSISTANT

## 2021-12-17 PROCEDURE — 120N000001 HC R&B MED SURG/OB

## 2021-12-17 PROCEDURE — 250N000011 HC RX IP 250 OP 636: Performed by: PHYSICIAN ASSISTANT

## 2021-12-17 PROCEDURE — 99233 SBSQ HOSP IP/OBS HIGH 50: CPT | Performed by: HOSPITALIST

## 2021-12-17 PROCEDURE — 999N000157 HC STATISTIC RCP TIME EA 10 MIN

## 2021-12-17 PROCEDURE — 84132 ASSAY OF SERUM POTASSIUM: CPT | Performed by: HOSPITALIST

## 2021-12-17 PROCEDURE — 83735 ASSAY OF MAGNESIUM: CPT | Performed by: HOSPITALIST

## 2021-12-17 PROCEDURE — 85379 FIBRIN DEGRADATION QUANT: CPT | Performed by: HOSPITALIST

## 2021-12-17 PROCEDURE — C9113 INJ PANTOPRAZOLE SODIUM, VIA: HCPCS | Performed by: HOSPITALIST

## 2021-12-17 PROCEDURE — 250N000013 HC RX MED GY IP 250 OP 250 PS 637: Performed by: HOSPITALIST

## 2021-12-17 PROCEDURE — 86140 C-REACTIVE PROTEIN: CPT | Performed by: HOSPITALIST

## 2021-12-17 PROCEDURE — 36415 COLL VENOUS BLD VENIPUNCTURE: CPT | Performed by: HOSPITALIST

## 2021-12-17 PROCEDURE — 80076 HEPATIC FUNCTION PANEL: CPT | Performed by: INTERNAL MEDICINE

## 2021-12-17 PROCEDURE — 85027 COMPLETE CBC AUTOMATED: CPT | Performed by: HOSPITALIST

## 2021-12-17 PROCEDURE — 250N000011 HC RX IP 250 OP 636: Performed by: HOSPITALIST

## 2021-12-17 PROCEDURE — 250N000012 HC RX MED GY IP 250 OP 636 PS 637: Performed by: INTERNAL MEDICINE

## 2021-12-17 PROCEDURE — 250N000009 HC RX 250: Performed by: PHYSICIAN ASSISTANT

## 2021-12-17 PROCEDURE — 258N000003 HC RX IP 258 OP 636: Performed by: PHYSICIAN ASSISTANT

## 2021-12-17 PROCEDURE — 36415 COLL VENOUS BLD VENIPUNCTURE: CPT | Performed by: INTERNAL MEDICINE

## 2021-12-17 RX ORDER — AZITHROMYCIN 250 MG/1
250 TABLET, FILM COATED ORAL DAILY
Status: COMPLETED | OUTPATIENT
Start: 2021-12-17 | End: 2021-12-20

## 2021-12-17 RX ADMIN — DEXAMETHASONE 6 MG: 2 TABLET ORAL at 09:05

## 2021-12-17 RX ADMIN — GUAIFENESIN AND DEXTROMETHORPHAN HYDROBROMIDE 1 TABLET: 600; 30 TABLET, EXTENDED RELEASE ORAL at 21:22

## 2021-12-17 RX ADMIN — ENOXAPARIN SODIUM 40 MG: 40 INJECTION SUBCUTANEOUS at 21:22

## 2021-12-17 RX ADMIN — TRAZODONE HYDROCHLORIDE 100 MG: 100 TABLET ORAL at 21:22

## 2021-12-17 RX ADMIN — AMLODIPINE BESYLATE 5 MG: 5 TABLET ORAL at 09:05

## 2021-12-17 RX ADMIN — BENZONATATE 100 MG: 100 CAPSULE, LIQUID FILLED ORAL at 09:05

## 2021-12-17 RX ADMIN — SODIUM CHLORIDE, PRESERVATIVE FREE 50 ML: 5 INJECTION INTRAVENOUS at 18:52

## 2021-12-17 RX ADMIN — GUAIFENESIN AND CODEINE PHOSPHATE 10 ML: 10; 100 LIQUID ORAL at 05:20

## 2021-12-17 RX ADMIN — GUAIFENESIN AND DEXTROMETHORPHAN HYDROBROMIDE 1 TABLET: 600; 30 TABLET, EXTENDED RELEASE ORAL at 09:05

## 2021-12-17 RX ADMIN — PANTOPRAZOLE SODIUM 40 MG: 40 INJECTION, POWDER, FOR SOLUTION INTRAVENOUS at 09:05

## 2021-12-17 RX ADMIN — CEFTRIAXONE 1 G: 1 INJECTION, POWDER, FOR SOLUTION INTRAMUSCULAR; INTRAVENOUS at 14:16

## 2021-12-17 RX ADMIN — REMDESIVIR 100 MG: 100 INJECTION, POWDER, LYOPHILIZED, FOR SOLUTION INTRAVENOUS at 18:51

## 2021-12-17 RX ADMIN — BENZONATATE 100 MG: 100 CAPSULE, LIQUID FILLED ORAL at 18:50

## 2021-12-17 RX ADMIN — AZITHROMYCIN MONOHYDRATE 250 MG: 250 TABLET ORAL at 16:33

## 2021-12-17 RX ADMIN — BENZONATATE 100 MG: 100 CAPSULE, LIQUID FILLED ORAL at 12:14

## 2021-12-17 RX ADMIN — LOSARTAN POTASSIUM 50 MG: 50 TABLET, FILM COATED ORAL at 09:05

## 2021-12-17 ASSESSMENT — ACTIVITIES OF DAILY LIVING (ADL)
ADLS_ACUITY_SCORE: 3.75

## 2021-12-17 NOTE — PLAN OF CARE
To Do:  End of Shift Summary  For vital signs and complete assessments, please see documentation flowsheets.     Pertinent assessments: A&O x4. Face & neck flushed. Hands are tremulous. Denies pain. LS fine coarse- productive frequent cough. Given PRN robitussin and scheduled tessalon. Pulmonary hygiene/IS encouraged. Up ad leny, urinal provided.      Major Shift Events: Oxygen increased to 12 LPM oxymask. De-sat to 70% w/activity. BP's elevated, Norvasc re-started. Loss of IV access.    Treatment Plan: Remdesivir, decadron, lovenox, supplemental oxygen, cough meds &  aggressive pulmo toilet, IS.     Bedside Nurse: Sylvia Ortega RN

## 2021-12-17 NOTE — PLAN OF CARE
Pertinent assessments: A&O x4. Up SBA. Using urinal at bedside. VSS. 15L oxy mask. LS dim. Frequent productive cough. Dyspnea on exertion. Using IS. Encouraged self proning over night.      Major Shift Events: Uneventful    Treatment Plan: Remdesivir, decadron, lovenox, supplemental oxygen, cough meds &  aggressive pulmo toilet, IS.     Bedside Nurse: Katty Julian RN

## 2021-12-17 NOTE — PLAN OF CARE
End of Shift Summary  For vital signs and complete assessments, please see documentation flowsheets.     Pertinent assessments: Pt A&Ox4, resting in bed on side. O2 increased to 30L 100%FiO2 on HFNC. Extreme MORATAYA, pt on bedrest and using urinal at bedside.     Major Shift Events: HFNC    Treatment Plan: Remdesivir, decadron, lovenox, HFNC, tessalon, encourage IS and proning, rocephin

## 2021-12-17 NOTE — PROGRESS NOTES
[facility]    Hospitalist Progress Note             Date of Admission:  12/14/2021                   Day of hospitalization: 3    Assessment and Plan:      Jung Severino is a 56 year old UNVACCINATED male with a PMH significant for HTN, obesity who developed fever and chills on 12/1 and confirmed COVID positive on 12/3. He continues to have intermittent fever (up to 103) and fatigue and was seen in the ED on 12/7; discharged as he was HD stable and not hypoxic. He returned yesterday to ED due to concerns for 20 lbs weight loss and episodes of hypoxia (upper 80's) since this weekend.  However he left ED after having only labs and CXR performed given the wait time. PCP called him today and informed him of ARF and asked him to return to the ED.      Work up in the ED today shows hypokalemia, ARF with Bun/Cr of 63/1.97 from 2.03 earlier today. WBC normal, and concentrated UA. CXR shows opacities c/w PNA.   O2 sats was 88% with activity and 92% at rest. He was started on steroids and supplemental O2 and recommended for admission.      #Acute hypoxic respiratory failure due to COVID 19  # COVID 19 pneumonitis   -  hypoxia worse today, on high flow nasal cannula now with max support at 100%  - CRP is improving yesterday, repeat today, D-dimer yesterday was at 2.07 will repeat today.   - He aready had a CT angiogram which was negative for acute PE on 12/15  -  Pro calcitonin is .014, in the setting of progressive hypoxia will start empiric antibiotics, ceftriaxone and azithromycin   -  Cont Dexamethasone (1/10)  -  Discussed Remdesivir, pt wants to start (1/5). LFTs elevated borderline, improving   -  Discussed Baricitintib with patient.  He wants to discuss in detail side effect profile with his friend prior to considering this medication  -  Aggressive Pulm toilet, IS  -  Cough support with tessalon/mucinex/robitussin     #Acute renal failure   -Resolved most likely prerenal, continue IV fluids  "another 24 hours.  Gentle hydration in the setting of Covid pneumonitis     #HTN  -Losartan restarted today     I did try to call wife but she did not answer    # Code status:  Full, patient unsure of his CODE STATUS he would like to think about it further full code for now  # Anticipated discharge date and Disposition: Unknown  # DVT: Lovenox   # IVF:  Fluids have been stopped              Iris Mchugh MD  Text Page (7am - 6pm, M-F)               Subjective   Chief Complaint: sob  Subjective: fells ok, states he in the morning he had a coughing fit and increased O2 requirements.  He feels okay now.  He denies any nausea vomiting chest pain fevers chills yesterday nonproductive cough       Objective   /85 (BP Location: Right arm)   Pulse 81   Temp 98  F (36.7  C) (Oral)   Resp 22   Ht 1.727 m (5' 8\")   Wt 89.3 kg (196 lb 12.8 oz)   SpO2 94%   BMI 29.92 kg/m       Physical Exam  General: Pt in NAD, normal appearance  HEENT: OP clear MMM, no JVD  Lungs: Bibasilar crackles, normal breathing  without accessory muscle usage, no wheezing, rhonchi or crackles  Cardiac: +S1, S2, RRR, no MRG, no edema  Abdominal: normal bowel sounds, NT/ND, no hepatosplenomegaly  Skin: warm, dry, normal turgor, no rash  Psyche: A& O x3, appropriate affect             Intake/Output Summary (Last 24 hours) at 12/15/2021 1204  Last data filed at 12/15/2021 0500  Gross per 24 hour   Intake --   Output 400 ml   Net -400 ml           Labs and Imaging Results:      Recent Labs   Lab 12/17/21  0901 12/16/21  0642   WBC 9.4 6.0   HGB 14.4 13.8    189        Recent Labs   Lab 12/16/21  0642 12/15/21  0909    140   CO2 20 23   BUN 28 43*      No results for input(s): INR, PTT in the last 168 hours.   No results for input(s): CKMB in the last 168 hours.    Invalid input(s): TROPONINT     Recent Labs   Lab 12/17/21  0901 12/15/21  0909   ALBUMIN 2.6* 2.6*   AST 90* 206*   * 117*   ALKPHOS 61 57        Micro:   "   Radio:  CT Chest Pulmonary Embolism w Contrast   Final Result   IMPRESSION:   1.  No evidence of pulmonary embolism.   2.  Ground-glass and interstitial opacities with a typical appearance   of COVID-19 are present.      MELONIE BLANTON MD            SYSTEM ID:  [id number]      XR Chest 2 Views   Final Result   IMPRESSION: No pleural fluid or pneumothorax. Heterogeneous pulmonary opacities could be seen with atypical infection such as COVID 19 pneumonia. These opacities are minimally more conspicuous than on the comparison study from earlier today. Normal size    of the heart.               Medications:      Scheduled Meds:      remdesivir  100 mg Intravenous Q24H    And     sodium chloride 0.9%  50 mL Intravenous Q24H     amLODIPine  5 mg Oral Daily     benzonatate  100 mg Oral TID     cefTRIAXone  1 g Intravenous Q24H     dexamethasone  6 mg Oral Daily     dextromethorphan-guaiFENesin  1 tablet Oral BID     enoxaparin ANTICOAGULANT  40 mg Subcutaneous Q24H     losartan  50 mg Oral Daily     pantoprazole (PROTONIX) IV  40 mg Intravenous Daily with breakfast     traZODone  100 mg Oral At Bedtime     Continuous Infusions:      - MEDICATION INSTRUCTIONS -       PRN Meds:  acetaminophen, albuterol, guaiFENesin-codeine, hydrALAZINE, - MEDICATION INSTRUCTIONS -, ondansetron **OR** ondansetron, prochlorperazine **OR** prochlorperazine **OR** prochlorperazine, senna-docusate **OR** senna-docusate

## 2021-12-17 NOTE — PROGRESS NOTES
RT Note:    Patient started on HFNC for PEEP/O2 therapy. Settings 30LPM, 100%. Tried starting patient on flow of 40LPM and then 35LPM, but patient could not tolerate due to the air feeling too dry. Explained it is likely because the humidifier was just started and it should be more comfortable as the heater/humidity warms up more. Will adjust HFNC settings as tolerated. RT will continue to follow.

## 2021-12-18 LAB
HOLD SPECIMEN: NORMAL
MAGNESIUM SERPL-MCNC: 1.7 MG/DL (ref 1.6–2.3)
POTASSIUM BLD-SCNC: 4.4 MMOL/L (ref 3.4–5.3)

## 2021-12-18 PROCEDURE — 99233 SBSQ HOSP IP/OBS HIGH 50: CPT | Performed by: INTERNAL MEDICINE

## 2021-12-18 PROCEDURE — 250N000011 HC RX IP 250 OP 636: Performed by: HOSPITALIST

## 2021-12-18 PROCEDURE — 999N000157 HC STATISTIC RCP TIME EA 10 MIN

## 2021-12-18 PROCEDURE — 250N000013 HC RX MED GY IP 250 OP 250 PS 637: Performed by: PHYSICIAN ASSISTANT

## 2021-12-18 PROCEDURE — 250N000012 HC RX MED GY IP 250 OP 636 PS 637: Performed by: INTERNAL MEDICINE

## 2021-12-18 PROCEDURE — 258N000003 HC RX IP 258 OP 636: Performed by: PHYSICIAN ASSISTANT

## 2021-12-18 PROCEDURE — C9113 INJ PANTOPRAZOLE SODIUM, VIA: HCPCS | Performed by: HOSPITALIST

## 2021-12-18 PROCEDURE — 120N000001 HC R&B MED SURG/OB

## 2021-12-18 PROCEDURE — 999N000215 HC STATISTIC HFNC ADULT NON-CPAP

## 2021-12-18 PROCEDURE — 250N000013 HC RX MED GY IP 250 OP 250 PS 637: Performed by: INTERNAL MEDICINE

## 2021-12-18 PROCEDURE — 250N000011 HC RX IP 250 OP 636: Performed by: PHYSICIAN ASSISTANT

## 2021-12-18 PROCEDURE — 250N000013 HC RX MED GY IP 250 OP 250 PS 637: Performed by: HOSPITALIST

## 2021-12-18 PROCEDURE — 84132 ASSAY OF SERUM POTASSIUM: CPT | Performed by: HOSPITALIST

## 2021-12-18 PROCEDURE — 83735 ASSAY OF MAGNESIUM: CPT | Performed by: HOSPITALIST

## 2021-12-18 PROCEDURE — XW0DXM6 INTRODUCTION OF BARICITINIB INTO MOUTH AND PHARYNX, EXTERNAL APPROACH, NEW TECHNOLOGY GROUP 6: ICD-10-PCS | Performed by: HOSPITALIST

## 2021-12-18 PROCEDURE — 250N000009 HC RX 250: Performed by: PHYSICIAN ASSISTANT

## 2021-12-18 PROCEDURE — 36415 COLL VENOUS BLD VENIPUNCTURE: CPT | Performed by: HOSPITALIST

## 2021-12-18 RX ADMIN — REMDESIVIR 100 MG: 100 INJECTION, POWDER, LYOPHILIZED, FOR SOLUTION INTRAVENOUS at 19:53

## 2021-12-18 RX ADMIN — SODIUM CHLORIDE, PRESERVATIVE FREE 50 ML: 5 INJECTION INTRAVENOUS at 19:54

## 2021-12-18 RX ADMIN — TRAZODONE HYDROCHLORIDE 100 MG: 100 TABLET ORAL at 21:37

## 2021-12-18 RX ADMIN — PANTOPRAZOLE SODIUM 40 MG: 40 INJECTION, POWDER, FOR SOLUTION INTRAVENOUS at 08:27

## 2021-12-18 RX ADMIN — BARICITINIB 4 MG: 2 TABLET, FILM COATED ORAL at 11:47

## 2021-12-18 RX ADMIN — CEFTRIAXONE 1 G: 1 INJECTION, POWDER, FOR SOLUTION INTRAMUSCULAR; INTRAVENOUS at 14:20

## 2021-12-18 RX ADMIN — BENZONATATE 100 MG: 100 CAPSULE, LIQUID FILLED ORAL at 11:47

## 2021-12-18 RX ADMIN — GUAIFENESIN AND DEXTROMETHORPHAN HYDROBROMIDE 1 TABLET: 600; 30 TABLET, EXTENDED RELEASE ORAL at 21:37

## 2021-12-18 RX ADMIN — DEXAMETHASONE 6 MG: 2 TABLET ORAL at 08:27

## 2021-12-18 RX ADMIN — ENOXAPARIN SODIUM 40 MG: 40 INJECTION SUBCUTANEOUS at 21:37

## 2021-12-18 RX ADMIN — AZITHROMYCIN MONOHYDRATE 250 MG: 250 TABLET ORAL at 08:27

## 2021-12-18 RX ADMIN — GUAIFENESIN AND DEXTROMETHORPHAN HYDROBROMIDE 1 TABLET: 600; 30 TABLET, EXTENDED RELEASE ORAL at 08:27

## 2021-12-18 RX ADMIN — AMLODIPINE BESYLATE 5 MG: 5 TABLET ORAL at 08:27

## 2021-12-18 RX ADMIN — BENZONATATE 100 MG: 100 CAPSULE, LIQUID FILLED ORAL at 08:27

## 2021-12-18 RX ADMIN — BENZONATATE 100 MG: 100 CAPSULE, LIQUID FILLED ORAL at 19:53

## 2021-12-18 ASSESSMENT — ACTIVITIES OF DAILY LIVING (ADL)
ADLS_ACUITY_SCORE: 3.75

## 2021-12-18 NOTE — PLAN OF CARE
End of Shift Summary  For vital signs and complete assessments, please see documentation flowsheets.     Pertinent assessments: Pt A&Ox4, VSS. resting in bed on side. Lung sounds diminished on 30L 90% FiO2 on HFNC. Extreme MORATAYA, pt on bedrest and using urinal at bedside. Frequent productive cough.    Major Shift Events: none    Treatment Plan: Remdesivir, decadron, lovenox, baricitinib, HFNC, tessalon, encourage IS and proning, rocephin

## 2021-12-18 NOTE — PLAN OF CARE
Pertinent assessments: Pt A&Ox4, VSS. resting in bed on side. O2 increased to 30L 100%FiO2 on HFNC. Extreme MORATAYA, pt on bedrest and using urinal at bedside. Frequent productive cough. LS dim.     Major Shift Events: Uneventful    Treatment Plan: Remdesivir, decadron, lovenox, HFNC, tessalon, encourage IS and proning, rocephin  Bedside Nurse: Katty Julian RN

## 2021-12-18 NOTE — PROGRESS NOTES
[facility]  Hospitalist Progress Note    Tucker Hernandez MD 12/18/2021  Text Page      Reason for Stay (Diagnosis): Covid pneumonia         Assessment and Plan:      Summary of Stay: Jung Severino is a 56 year old  male with a PMH significant for HTN, obesity who developed fever and chills on 12/1 and confirmed COVID positive on 12/3. He continued to have intermittent fever (up to 103) and fatigue and was seen in the ED on 12/7; discharged as he was HD stable and not hypoxic. He returned yesterday to ED due to concerns for 20 lbs weight loss and episodes of hypoxia (upper 80's) since this weekend.  However he left ED after having only labs and CXR performed given the wait time. PCP called him  and informed him of ARF and asked him to return to the ED. He has not had Covid vaccine.     Work up in the ED  showed hypokalemia, ARF with Bun/Cr of 63/1.97. WBC normal, and concentrated UA. CXR shows opacities c/w PNA.   O2 sats was 88% with activity and 92% at rest. He was started on steroids and supplemental O2 and recommended for admission.      #Acute hypoxic respiratory failure due to COVID 19  # COVID 19 pneumonitis   -  hypoxia severe but stable over the last 24 hours, on high flow nasal cannula now at 30 L/min and at 90% FiO2  - Inflammatory markers improving  - He everett had a CT angiogram which was negative for acute PE on 12/15  -  Pro calcitonin was .014, in the setting of progressive hypoxia, started empiric antibiotics with ceftriaxone and azithromycin on 12/15  -  Cont Dexamethasone started 12/14  -  Discussed Remdesivir, started 12/15 to complete tomorrow  -  Discussed Baricitintib with patient including the risks and benefits and emergency use authorization information.  He agreed to start this today (12/18)  -  Aggressive Pulm toilet, IS  -  Cough support with tessalon/mucinex/robitussin     #Acute renal failure   -Resolved with IV hydration     #HTN  -Losartan restarted  "          # Code status:  Full, patient unsure of his CODE STATUS he would like to think about it further full code for now  # Anticipated discharge date and Disposition:  Several more days, when hypoxia improves  # DVT: Lovenox      Entered: Tucker Hernandez 12/18/2021, 3:58 PM               Interval History (Subjective):      Feeling better than yesterday.  He self proning.                  Physical Exam:      Last Vital Signs:  /86 (BP Location: Right arm)   Pulse 88   Temp 98  F (36.7  C) (Oral)   Resp 22   Ht 1.727 m (5' 8\")   Wt 89.3 kg (196 lb 12.8 oz)   SpO2 91%   BMI 29.92 kg/m      Vital signs reviewed  General:  Alert, calm, NAD  CV: regular rate and rhythm, no murmurs or rubs  Lungs:  Clear to ascultation bilaterally, normal respiratory effort  HEENT:  Pupil round, equal, conjuctivae, sclerae and lids normal, neck is supple  Abdomen:  Soft, nontender, nondistended, no masses, normal bowel sounds  Extremities:  No edema  Neuro: normal strength and sensation in all 4 extremities, cranial nerves grossly intact  Psychiatric:  Mood and affect within normal limits             Medications:      All current medications were reviewed with changes reflected in problem list.         Data:      All new lab and imaging data was reviewed.   Labs yesterday:  CRP 34.4 from 64.7  D-dimer 1.6 from 2.0   from 117  AST 90 from 206  "

## 2021-12-19 LAB
ALBUMIN SERPL-MCNC: 2.3 G/DL (ref 3.4–5)
ALP SERPL-CCNC: 54 U/L (ref 40–150)
ALT SERPL W P-5'-P-CCNC: 83 U/L (ref 0–70)
ANION GAP SERPL CALCULATED.3IONS-SCNC: 6 MMOL/L (ref 3–14)
AST SERPL W P-5'-P-CCNC: 39 U/L (ref 0–45)
BILIRUB SERPL-MCNC: 0.7 MG/DL (ref 0.2–1.3)
BUN SERPL-MCNC: 25 MG/DL (ref 7–30)
CALCIUM SERPL-MCNC: 9.1 MG/DL (ref 8.5–10.1)
CHLORIDE BLD-SCNC: 106 MMOL/L (ref 94–109)
CO2 SERPL-SCNC: 27 MMOL/L (ref 20–32)
CREAT SERPL-MCNC: 0.93 MG/DL (ref 0.66–1.25)
GFR SERPL CREATININE-BSD FRML MDRD: 90 ML/MIN/1.73M2
GLUCOSE BLD-MCNC: 118 MG/DL (ref 70–99)
HOLD SPECIMEN: NORMAL
MAGNESIUM SERPL-MCNC: 1.9 MG/DL (ref 1.6–2.3)
POTASSIUM BLD-SCNC: 4.2 MMOL/L (ref 3.4–5.3)
PROT SERPL-MCNC: 6.8 G/DL (ref 6.8–8.8)
SODIUM SERPL-SCNC: 139 MMOL/L (ref 133–144)

## 2021-12-19 PROCEDURE — 250N000011 HC RX IP 250 OP 636: Performed by: PHYSICIAN ASSISTANT

## 2021-12-19 PROCEDURE — 999N000215 HC STATISTIC HFNC ADULT NON-CPAP

## 2021-12-19 PROCEDURE — 999N000157 HC STATISTIC RCP TIME EA 10 MIN

## 2021-12-19 PROCEDURE — 250N000012 HC RX MED GY IP 250 OP 636 PS 637: Performed by: INTERNAL MEDICINE

## 2021-12-19 PROCEDURE — 120N000001 HC R&B MED SURG/OB

## 2021-12-19 PROCEDURE — 36415 COLL VENOUS BLD VENIPUNCTURE: CPT | Performed by: INTERNAL MEDICINE

## 2021-12-19 PROCEDURE — C9113 INJ PANTOPRAZOLE SODIUM, VIA: HCPCS | Performed by: HOSPITALIST

## 2021-12-19 PROCEDURE — 250N000011 HC RX IP 250 OP 636: Performed by: HOSPITALIST

## 2021-12-19 PROCEDURE — 250N000013 HC RX MED GY IP 250 OP 250 PS 637: Performed by: PHYSICIAN ASSISTANT

## 2021-12-19 PROCEDURE — 250N000013 HC RX MED GY IP 250 OP 250 PS 637: Performed by: INTERNAL MEDICINE

## 2021-12-19 PROCEDURE — 250N000013 HC RX MED GY IP 250 OP 250 PS 637: Performed by: HOSPITALIST

## 2021-12-19 PROCEDURE — 80053 COMPREHEN METABOLIC PANEL: CPT | Performed by: INTERNAL MEDICINE

## 2021-12-19 PROCEDURE — 99233 SBSQ HOSP IP/OBS HIGH 50: CPT | Performed by: INTERNAL MEDICINE

## 2021-12-19 PROCEDURE — 83735 ASSAY OF MAGNESIUM: CPT | Performed by: INTERNAL MEDICINE

## 2021-12-19 RX ADMIN — ENOXAPARIN SODIUM 40 MG: 40 INJECTION SUBCUTANEOUS at 21:18

## 2021-12-19 RX ADMIN — BARICITINIB 4 MG: 2 TABLET, FILM COATED ORAL at 08:17

## 2021-12-19 RX ADMIN — CEFTRIAXONE 1 G: 1 INJECTION, POWDER, FOR SOLUTION INTRAMUSCULAR; INTRAVENOUS at 13:59

## 2021-12-19 RX ADMIN — GUAIFENESIN AND DEXTROMETHORPHAN HYDROBROMIDE 1 TABLET: 600; 30 TABLET, EXTENDED RELEASE ORAL at 08:17

## 2021-12-19 RX ADMIN — DEXAMETHASONE 6 MG: 2 TABLET ORAL at 08:17

## 2021-12-19 RX ADMIN — TRAZODONE HYDROCHLORIDE 100 MG: 100 TABLET ORAL at 21:18

## 2021-12-19 RX ADMIN — PANTOPRAZOLE SODIUM 40 MG: 40 INJECTION, POWDER, FOR SOLUTION INTRAVENOUS at 08:17

## 2021-12-19 RX ADMIN — BENZONATATE 100 MG: 100 CAPSULE, LIQUID FILLED ORAL at 08:17

## 2021-12-19 RX ADMIN — BENZONATATE 100 MG: 100 CAPSULE, LIQUID FILLED ORAL at 18:21

## 2021-12-19 RX ADMIN — AMLODIPINE BESYLATE 5 MG: 5 TABLET ORAL at 08:17

## 2021-12-19 RX ADMIN — AZITHROMYCIN MONOHYDRATE 250 MG: 250 TABLET ORAL at 08:17

## 2021-12-19 RX ADMIN — LOSARTAN POTASSIUM 50 MG: 50 TABLET, FILM COATED ORAL at 08:17

## 2021-12-19 RX ADMIN — BENZONATATE 100 MG: 100 CAPSULE, LIQUID FILLED ORAL at 11:24

## 2021-12-19 RX ADMIN — GUAIFENESIN AND DEXTROMETHORPHAN HYDROBROMIDE 1 TABLET: 600; 30 TABLET, EXTENDED RELEASE ORAL at 21:18

## 2021-12-19 ASSESSMENT — ACTIVITIES OF DAILY LIVING (ADL)
ADLS_ACUITY_SCORE: 3.75
ADLS_ACUITY_SCORE: 5.75
ADLS_ACUITY_SCORE: 3.75
ADLS_ACUITY_SCORE: 5.75
ADLS_ACUITY_SCORE: 3.75
ADLS_ACUITY_SCORE: 5.75
ADLS_ACUITY_SCORE: 3.75
ADLS_ACUITY_SCORE: 5.75
ADLS_ACUITY_SCORE: 3.75

## 2021-12-19 NOTE — PLAN OF CARE
Continues with remdesivir and lovenox. Sats in low to mid 90's on high flow oxygen. Expressing anxiety over his condition. Encourage on his progress. Using IS. Voiding spontaneously. Had bm this evening. Ordered and ate supper.

## 2021-12-19 NOTE — PROGRESS NOTES
[facility]  Hospitalist Progress Note    Tucker Hernandez MD 12/19/2021  Text Page      Reason for Stay (Diagnosis): Covid pneumonia         Assessment and Plan:      Summary of Stay: Jung Severino is a 56 year old  male with a PMH significant for HTN, obesity who developed fever and chills on 12/1 and confirmed COVID positive on 12/3. He continued to have intermittent fever (up to 103) and fatigue and was seen in the ED on 12/7; discharged as he was HD stable and not hypoxic. He returned yesterday to ED due to concerns for 20 lbs weight loss and episodes of hypoxia (upper 80's) since this weekend.  However he left ED after having only labs and CXR performed given the wait time. PCP called him  and informed him of ARF and asked him to return to the ED. He has not had Covid vaccine.     Work up in the ED  showed hypokalemia, ARF with Bun/Cr of 63/1.97. WBC normal, and concentrated UA. CXR shows opacities c/w PNA.   O2 sats was 88% with activity and 92% at rest. He was started on steroids and supplemental O2 and recommended for admission.      #Acute hypoxic respiratory failure due to COVID 19  # COVID 19 pneumonitis   -  hypoxia severe but stable over the last 24 hours, on high flow nasal cannula now at 30 L/min and improved a bit from 90% to 80% FiO2  - Inflammatory markers improving  - He everett had a CT angiogram which was negative for acute PE on 12/15  -  Pro calcitonin was .014, in the setting of progressive hypoxia, started empiric antibiotics with ceftriaxone and azithromycin on 12/15  -  Cont Dexamethasone started 12/14  -  Discussed Remdesivir, started 12/15 to complete tomorrow  -  Discussed Baricitintib with patient including the risks and benefits and emergency use authorization information.  He agreed to start this(12/18)  -  Aggressive Pulm toilet, IS  -  Cough support with tessalon/mucinex/robitussin     #Acute renal failure   -Resolved with IV hydration     #HTN  -Losartan  "restarted           # Code status:  Full, patient unsure of his CODE STATUS he would like to think about it further full code for now  # Anticipated discharge date and Disposition:  Several more days, when hypoxia improves  # DVT: Lovenox      Entered: Tucker Hernandez 12/19/2021, 1:47 PM               Interval History (Subjective):      He got winded going to the commode yesterday.  He is self proning.                  Physical Exam:      Last Vital Signs:  /85 (BP Location: Right arm)   Pulse 89   Temp 98  F (36.7  C) (Oral)   Resp 18   Ht 1.727 m (5' 8\")   Wt 89.3 kg (196 lb 12.8 oz)   SpO2 91%   BMI 29.92 kg/m      Vital signs reviewed  General:  Alert, calm, NAD  CV: regular rate and rhythm, no murmurs or rubs  Lungs:  Clear to ascultation bilaterally, normal respiratory effort  HEENT:  Pupil round, equal, conjuctivae, sclerae and lids normal, neck is supple  Abdomen:  Soft, nontender, nondistended, no masses, normal bowel sounds  Extremities:  No edema  Neuro: normal strength and sensation in all 4 extremities, cranial nerves grossly intact  Psychiatric:  Mood and affect within normal limits             Medications:      All current medications were reviewed with changes reflected in problem list.         Data:      All new lab and imaging data was reviewed.   Labs yesterday:  CRP 34.4 from 64.7  D-dimer 1.6 from 2.0   from 117  AST 90 from 206  "

## 2021-12-19 NOTE — PLAN OF CARE
Pertinent assessments: A&Ox4. VSS on HFNC 30L 80% FiO2. Extreme MORATAYA, pt on bedrest and using urinal at bedside. Frequent productive cough. LS diminished. Denies pain    Major Shift Events: none    Treatment Plan: Remdesivir, decadron, lovenox, baricitinib, HFNC, tessalon, encourage IS and proning, rocephin

## 2021-12-19 NOTE — PLAN OF CARE
End of Shift Summary  For vital signs and complete assessments, please see documentation flowsheets.     Pertinent assessments: A&Ox4. VSS on HFNC 30L 80% FiO2. Extreme MORATAYA, pt on bedrest and using urinal at bedside. Frequent productive cough, scheduled tessalon given. LS diminished.     Major Shift Events: none    Treatment Plan: Remdesivir, decadron, lovenox, baricitinib, HFNC, tessalon, encourage IS and proning, rocephin

## 2021-12-20 LAB
MAGNESIUM SERPL-MCNC: 2 MG/DL (ref 1.6–2.3)
POTASSIUM BLD-SCNC: 4.7 MMOL/L (ref 3.4–5.3)

## 2021-12-20 PROCEDURE — 999N000157 HC STATISTIC RCP TIME EA 10 MIN

## 2021-12-20 PROCEDURE — 250N000013 HC RX MED GY IP 250 OP 250 PS 637: Performed by: PHYSICIAN ASSISTANT

## 2021-12-20 PROCEDURE — 36415 COLL VENOUS BLD VENIPUNCTURE: CPT | Performed by: INTERNAL MEDICINE

## 2021-12-20 PROCEDURE — 250N000013 HC RX MED GY IP 250 OP 250 PS 637: Performed by: INTERNAL MEDICINE

## 2021-12-20 PROCEDURE — 250N000011 HC RX IP 250 OP 636: Performed by: PHYSICIAN ASSISTANT

## 2021-12-20 PROCEDURE — 250N000013 HC RX MED GY IP 250 OP 250 PS 637: Performed by: HOSPITALIST

## 2021-12-20 PROCEDURE — 120N000001 HC R&B MED SURG/OB

## 2021-12-20 PROCEDURE — 999N000215 HC STATISTIC HFNC ADULT NON-CPAP

## 2021-12-20 PROCEDURE — 83735 ASSAY OF MAGNESIUM: CPT | Performed by: INTERNAL MEDICINE

## 2021-12-20 PROCEDURE — 84132 ASSAY OF SERUM POTASSIUM: CPT | Performed by: INTERNAL MEDICINE

## 2021-12-20 PROCEDURE — C9113 INJ PANTOPRAZOLE SODIUM, VIA: HCPCS | Performed by: HOSPITALIST

## 2021-12-20 PROCEDURE — 250N000011 HC RX IP 250 OP 636: Performed by: HOSPITALIST

## 2021-12-20 PROCEDURE — 99233 SBSQ HOSP IP/OBS HIGH 50: CPT | Performed by: INTERNAL MEDICINE

## 2021-12-20 PROCEDURE — 250N000012 HC RX MED GY IP 250 OP 636 PS 637: Performed by: INTERNAL MEDICINE

## 2021-12-20 RX ORDER — HYDROXYZINE HYDROCHLORIDE 10 MG/1
10 TABLET, FILM COATED ORAL EVERY 4 HOURS PRN
Status: COMPLETED | OUTPATIENT
Start: 2021-12-20 | End: 2021-12-24

## 2021-12-20 RX ADMIN — BENZONATATE 100 MG: 100 CAPSULE, LIQUID FILLED ORAL at 18:24

## 2021-12-20 RX ADMIN — LOSARTAN POTASSIUM 50 MG: 50 TABLET, FILM COATED ORAL at 09:13

## 2021-12-20 RX ADMIN — BENZONATATE 100 MG: 100 CAPSULE, LIQUID FILLED ORAL at 12:27

## 2021-12-20 RX ADMIN — ENOXAPARIN SODIUM 40 MG: 40 INJECTION SUBCUTANEOUS at 21:06

## 2021-12-20 RX ADMIN — BARICITINIB 4 MG: 2 TABLET, FILM COATED ORAL at 09:11

## 2021-12-20 RX ADMIN — PANTOPRAZOLE SODIUM 40 MG: 40 INJECTION, POWDER, FOR SOLUTION INTRAVENOUS at 09:13

## 2021-12-20 RX ADMIN — GUAIFENESIN AND DEXTROMETHORPHAN HYDROBROMIDE 1 TABLET: 600; 30 TABLET, EXTENDED RELEASE ORAL at 21:05

## 2021-12-20 RX ADMIN — CEFTRIAXONE 1 G: 1 INJECTION, POWDER, FOR SOLUTION INTRAMUSCULAR; INTRAVENOUS at 14:38

## 2021-12-20 RX ADMIN — GUAIFENESIN AND DEXTROMETHORPHAN HYDROBROMIDE 1 TABLET: 600; 30 TABLET, EXTENDED RELEASE ORAL at 09:12

## 2021-12-20 RX ADMIN — AMLODIPINE BESYLATE 5 MG: 5 TABLET ORAL at 09:13

## 2021-12-20 RX ADMIN — DEXAMETHASONE 6 MG: 2 TABLET ORAL at 09:12

## 2021-12-20 RX ADMIN — BENZONATATE 100 MG: 100 CAPSULE, LIQUID FILLED ORAL at 09:13

## 2021-12-20 RX ADMIN — AZITHROMYCIN MONOHYDRATE 250 MG: 250 TABLET ORAL at 09:12

## 2021-12-20 RX ADMIN — TRAZODONE HYDROCHLORIDE 100 MG: 100 TABLET ORAL at 21:05

## 2021-12-20 ASSESSMENT — ACTIVITIES OF DAILY LIVING (ADL)
ADLS_ACUITY_SCORE: 3.75

## 2021-12-20 NOTE — PROGRESS NOTES
"         [facility]  Respiratory Care Note    The HFNC continues to be applied to the pt @ 30 LPM and 80% for PEEP therapy. Skin looks good and remains intact. Will continue to monitor and assess the pt's current respiratory status and needs.     Vital signs:  Temp: 98  F (36.7  C) Temp src: Oral BP: 114/78 Pulse: 96   Resp: 18 SpO2: 94 % O2 Device: High Flow Nasal Cannula (HFNC) Oxygen Delivery: 30 LPM Height: 172.7 cm (5' 8\") Weight: 89.3 kg (196 lb 12.8 oz)  Estimated body mass index is 29.92 kg/m  as calculated from the following:    Height as of this encounter: 1.727 m (5' 8\").    Weight as of this encounter: 89.3 kg (196 lb 12.8 oz).    Past Medical History:   Diagnosis Date     Alcohol abuse 12/29/2016     Anxiety 12/23/2009     Benign essential hypertension 3/5/2018     C. difficile colitis 12/29/2016     Fam hx-cardiovas dis NEC        Past Surgical History:   Procedure Laterality Date     NOT IN USE         Family History   Problem Relation Age of Onset     Heart Disease Father      Cancer No family hx of      Diabetes No family hx of        Social History     Tobacco Use     Smoking status: Never Smoker     Smokeless tobacco: Current User     Types: Chew     Tobacco comment: chewing tobacco   Substance Use Topics     Alcohol use: Not Currently     Alcohol/week: 0.0 standard drinks     Jose Hester RT  [facility]  12/19/2021    "

## 2021-12-20 NOTE — PROGRESS NOTES
Pt remained on HFNC 30 LPM @  80%. Skin integrity is good. Rt to follow.     Angela Quintero, RT

## 2021-12-20 NOTE — PLAN OF CARE
"To Do:  End of Shift Summary  For vital signs and complete assessments, please see documentation flowsheets.     Pertinent assessments: A&Ox4. VSS on HFNC 30L 80% FiO2. Extreme MORATAYA, pt on bedrest and using urinal at bedside. Frequent productive cough, scheduled tessalon given. LS diminished. Remdesivir completed 12/14-12/18.     Major Shift Events: none    Treatment Plan: Decadron, lovenox, baricitinib, HFNC, tessalon, encourage IS and proning, rocephin    /78 (BP Location: Right arm)   Pulse 96   Temp 98  F (36.7  C) (Oral)   Resp 18   Ht 1.727 m (5' 8\")   Wt 89.3 kg (196 lb 12.8 oz)   SpO2 94%   BMI 29.92 kg/m        Bedside Nurse: Francisca Florian RN    "

## 2021-12-20 NOTE — PLAN OF CARE
Pertinent assessments: A&Ox4. VSS on HFNC 30L 80% FiO2. Pt on bedrest and using urinal at bedside. Frequent productive cough, scheduled Mucinex DM and tessalon given. LS diminished. Self-proning.     Major Shift Events: none    Treatment Plan: Decadron, lovenox, baricitinib, HFNC, tessalon, rocephin, and azithromycin. Encourage IS and proning.     Bedside Nurse: Jocelyn Mario RN

## 2021-12-20 NOTE — PLAN OF CARE
End of Shift Summary  For vital signs and complete assessments, please see documentation flowsheets.     Pertinent assessments: A&Ox4. VSS on HFNC 30L 80% FiO2. Extreme MORATAYA, pt on bedrest and using urinal at bedside. Frequent productive cough, scheduled Mucinex DM given. LS diminished. Self-proning.     Major Shift Events: none    Treatment Plan: Decadron, lovenox, baricitinib, HFNC, tessalon, rocephin, Encourage IS and proning.

## 2021-12-21 LAB
CREAT SERPL-MCNC: 0.9 MG/DL (ref 0.66–1.25)
GFR SERPL CREATININE-BSD FRML MDRD: >90 ML/MIN/1.73M2
MAGNESIUM SERPL-MCNC: 2 MG/DL (ref 1.6–2.3)
POTASSIUM BLD-SCNC: 4.1 MMOL/L (ref 3.4–5.3)

## 2021-12-21 PROCEDURE — 250N000011 HC RX IP 250 OP 636: Performed by: PHYSICIAN ASSISTANT

## 2021-12-21 PROCEDURE — 36415 COLL VENOUS BLD VENIPUNCTURE: CPT | Performed by: INTERNAL MEDICINE

## 2021-12-21 PROCEDURE — 999N000157 HC STATISTIC RCP TIME EA 10 MIN

## 2021-12-21 PROCEDURE — 250N000013 HC RX MED GY IP 250 OP 250 PS 637: Performed by: HOSPITALIST

## 2021-12-21 PROCEDURE — 250N000012 HC RX MED GY IP 250 OP 636 PS 637: Performed by: INTERNAL MEDICINE

## 2021-12-21 PROCEDURE — 99233 SBSQ HOSP IP/OBS HIGH 50: CPT | Performed by: INTERNAL MEDICINE

## 2021-12-21 PROCEDURE — 82565 ASSAY OF CREATININE: CPT | Performed by: INTERNAL MEDICINE

## 2021-12-21 PROCEDURE — 250N000011 HC RX IP 250 OP 636: Performed by: HOSPITALIST

## 2021-12-21 PROCEDURE — 250N000013 HC RX MED GY IP 250 OP 250 PS 637: Performed by: INTERNAL MEDICINE

## 2021-12-21 PROCEDURE — 84132 ASSAY OF SERUM POTASSIUM: CPT | Performed by: INTERNAL MEDICINE

## 2021-12-21 PROCEDURE — 120N000001 HC R&B MED SURG/OB

## 2021-12-21 PROCEDURE — 83735 ASSAY OF MAGNESIUM: CPT | Performed by: INTERNAL MEDICINE

## 2021-12-21 PROCEDURE — C9113 INJ PANTOPRAZOLE SODIUM, VIA: HCPCS | Performed by: HOSPITALIST

## 2021-12-21 PROCEDURE — 250N000013 HC RX MED GY IP 250 OP 250 PS 637: Performed by: PHYSICIAN ASSISTANT

## 2021-12-21 RX ORDER — PANTOPRAZOLE SODIUM 40 MG/1
40 TABLET, DELAYED RELEASE ORAL
Status: DISCONTINUED | OUTPATIENT
Start: 2021-12-22 | End: 2021-12-28 | Stop reason: HOSPADM

## 2021-12-21 RX ADMIN — BENZONATATE 100 MG: 100 CAPSULE, LIQUID FILLED ORAL at 10:20

## 2021-12-21 RX ADMIN — BARICITINIB 4 MG: 2 TABLET, FILM COATED ORAL at 10:20

## 2021-12-21 RX ADMIN — ENOXAPARIN SODIUM 40 MG: 40 INJECTION SUBCUTANEOUS at 21:33

## 2021-12-21 RX ADMIN — GUAIFENESIN AND DEXTROMETHORPHAN HYDROBROMIDE 1 TABLET: 600; 30 TABLET, EXTENDED RELEASE ORAL at 10:20

## 2021-12-21 RX ADMIN — LOSARTAN POTASSIUM 50 MG: 50 TABLET, FILM COATED ORAL at 10:21

## 2021-12-21 RX ADMIN — BENZONATATE 100 MG: 100 CAPSULE, LIQUID FILLED ORAL at 19:01

## 2021-12-21 RX ADMIN — TRAZODONE HYDROCHLORIDE 100 MG: 100 TABLET ORAL at 21:33

## 2021-12-21 RX ADMIN — GUAIFENESIN AND DEXTROMETHORPHAN HYDROBROMIDE 1 TABLET: 600; 30 TABLET, EXTENDED RELEASE ORAL at 21:33

## 2021-12-21 RX ADMIN — HYDROXYZINE HYDROCHLORIDE 10 MG: 10 TABLET ORAL at 21:37

## 2021-12-21 RX ADMIN — CEFTRIAXONE 1 G: 1 INJECTION, POWDER, FOR SOLUTION INTRAMUSCULAR; INTRAVENOUS at 14:18

## 2021-12-21 RX ADMIN — AMLODIPINE BESYLATE 5 MG: 5 TABLET ORAL at 10:21

## 2021-12-21 RX ADMIN — DEXAMETHASONE 6 MG: 2 TABLET ORAL at 10:20

## 2021-12-21 RX ADMIN — PANTOPRAZOLE SODIUM 40 MG: 40 INJECTION, POWDER, FOR SOLUTION INTRAVENOUS at 10:21

## 2021-12-21 RX ADMIN — BENZONATATE 100 MG: 100 CAPSULE, LIQUID FILLED ORAL at 14:18

## 2021-12-21 ASSESSMENT — ACTIVITIES OF DAILY LIVING (ADL)
ADLS_ACUITY_SCORE: 3.75

## 2021-12-21 NOTE — PROGRESS NOTES
SPIRITUAL HEALTH SERVICES Progress Note  [facility] Med. Surg. 5    Contacted pt Nabeel per his length of stay.  Called his bedside phone and introduced myself.  Nabeel welcomed prayer but asked this author to call him back tomorrow because he wanted to sleep.    Plan: Will call Nabeel 12/22/2021 for prayer and to assess further needs.    Donald Mills M.Div., [facility]  Staff   Phone [phone]

## 2021-12-21 NOTE — PROVIDER NOTIFICATION
12/21/21 0300   Vital Signs   Resp 20   Pulse 85   Oxygen Therapy   SpO2 95 %   O2 Device High Flow Nasal Cannula (HFNC)   FiO2 (%) 80 %   Oxygen Delivery 30 LPM   Continues on HFNC

## 2021-12-21 NOTE — PLAN OF CARE
t up ind only to BSC. LS clear. On HFNC 75% 30L. Cough, has @ baseline, on sched meds. Denies SOB or chest pain. Becomes anxious when up to BSC & sats drop & feels like he  needs  to hurry & get back to bed. Appetite good, denies nausea. Uses urinal.

## 2021-12-21 NOTE — PLAN OF CARE
Pertinent assessments: HFNC 80% 30 LPM, LS dim, frequent productive cough, anxiety med ordered but pt declined for now    Major Shift Events: uneventful     Treatment Plan: O2 support, barcitinib, tessalon, rocephin, decadron, lovenox, robitussin

## 2021-12-21 NOTE — PLAN OF CARE
"/76 (BP Location: Right arm)   Pulse 76   Temp 97.7  F (36.5  C) (Oral)   Resp 20   Ht 1.727 m (5' 8\")   Wt 89.3 kg (196 lb 12.8 oz)   SpO2 95%   BMI 29.92 kg/m      Pt A&Ox4. VSS on HFNC 30LPM @ 80%. LS dim. Frequent, dry cough. IS therapy & proning encouraged. Urinal @ bedside. SBA to BSC. Baricitinib, decadron, lovenox. Will cont POC.     Ciara Delgado RN    "

## 2021-12-21 NOTE — PROGRESS NOTES
[facility]  Hospitalist Progress Note    Tucker Hernandez MD 12/21/2021  Text Page      Reason for Stay (Diagnosis): Covid pneumonia         Assessment and Plan:      Summary of Stay: Jung Severino is a 56 year old  male with a PMH significant for HTN, obesity who developed fever and chills on 12/1 and confirmed COVID positive on 12/3. He continued to have intermittent fever (up to 103) and fatigue and was seen in the ED on 12/7; discharged as he was HD stable and not hypoxic. He returned yesterday to ED due to concerns for 20 lbs weight loss and episodes of hypoxia (upper 80's) since this weekend.  However he left ED after having only labs and CXR performed given the wait time. PCP called him  and informed him of ARF and asked him to return to the ED. He has not had Covid vaccine.     Work up in the ED  showed hypokalemia, ARF with Bun/Cr of 63/1.97. WBC normal, and concentrated UA. CXR shows opacities c/w PNA.   O2 sats was 88% with activity and 92% at rest. He was started on steroids and supplemental O2 and recommended for admission.      #Acute hypoxic respiratory failure due to COVID 19  # COVID 19 pneumonitis   -  hypoxia severe but small improvements over the last 72 hours, on high flow nasal cannula now at 30 L/min and 75% FiO2  - Inflammatory markers improving  - He everett had a CT angiogram which was negative for acute PE on 12/15  -  Pro calcitonin was .014, in the setting of progressive hypoxia, started empiric antibiotics with ceftriaxone and azithromycin on 12/15.  Stop ceftriaxone after tomorrow's dose.   - Cont Dexamethasone started 12/14  -Completed remdesivir  -  Discussed Baricitintib with patient including the risks and benefits and emergency use authorization information.  He agreed to start this(12/18)  -  Aggressive Pulm toilet, IS  -  Cough support with tessalon/mucinex/robitussin     #Acute renal failure   -Resolved with IV hydration     #HTN  -Losartan restarted  "          # Code status:  Full, patient unsure of his CODE STATUS he would like to think about it further full code for now  # Anticipated discharge date and Disposition:  Several more days, when hypoxia improves  # DVT: Lovenox      Entered: Tucker Hernandez 12/21/2021, 2:37 PM               Interval History (Subjective):      Feels okay.  Denies pain.  Self proning.                  Physical Exam:      Last Vital Signs:  /67 (BP Location: Right arm)   Pulse 81   Temp 97.6  F (36.4  C) (Oral)   Resp 18   Ht 1.727 m (5' 8\")   Wt 89.3 kg (196 lb 12.8 oz)   SpO2 94%   BMI 29.92 kg/m      Vital signs reviewed  General:  Alert, calm, NAD  CV: regular rate and rhythm, no murmurs or rubs  Lungs:  Clear to ascultation bilaterally, normal respiratory effort  HEENT:  Pupil round, equal, conjuctivae, sclerae and lids normal, neck is supple  Abdomen:  Soft, nontender, nondistended, no masses, normal bowel sounds  Extremities:  No edema  Neuro: normal strength and sensation in all 4 extremities, cranial nerves grossly intact  Psychiatric:  Mood and affect within normal limits             Medications:      All current medications were reviewed with changes reflected in problem list.         Data:      All new lab and imaging data was reviewed.     "

## 2021-12-22 LAB
CREAT SERPL-MCNC: 0.97 MG/DL (ref 0.66–1.25)
GFR SERPL CREATININE-BSD FRML MDRD: 90 ML/MIN/1.73M2
MAGNESIUM SERPL-MCNC: 2 MG/DL (ref 1.6–2.3)
POTASSIUM BLD-SCNC: 4.5 MMOL/L (ref 3.4–5.3)

## 2021-12-22 PROCEDURE — 250N000013 HC RX MED GY IP 250 OP 250 PS 637: Performed by: INTERNAL MEDICINE

## 2021-12-22 PROCEDURE — 120N000001 HC R&B MED SURG/OB

## 2021-12-22 PROCEDURE — 250N000012 HC RX MED GY IP 250 OP 636 PS 637: Performed by: INTERNAL MEDICINE

## 2021-12-22 PROCEDURE — 84132 ASSAY OF SERUM POTASSIUM: CPT | Performed by: INTERNAL MEDICINE

## 2021-12-22 PROCEDURE — 99233 SBSQ HOSP IP/OBS HIGH 50: CPT | Performed by: INTERNAL MEDICINE

## 2021-12-22 PROCEDURE — 999N000157 HC STATISTIC RCP TIME EA 10 MIN

## 2021-12-22 PROCEDURE — 250N000013 HC RX MED GY IP 250 OP 250 PS 637: Performed by: HOSPITALIST

## 2021-12-22 PROCEDURE — 999N000127 HC STATISTIC PERIPHERAL IV START W US GUIDANCE

## 2021-12-22 PROCEDURE — 250N000013 HC RX MED GY IP 250 OP 250 PS 637: Performed by: PHYSICIAN ASSISTANT

## 2021-12-22 PROCEDURE — 82565 ASSAY OF CREATININE: CPT | Performed by: INTERNAL MEDICINE

## 2021-12-22 PROCEDURE — 83735 ASSAY OF MAGNESIUM: CPT | Performed by: INTERNAL MEDICINE

## 2021-12-22 PROCEDURE — 36415 COLL VENOUS BLD VENIPUNCTURE: CPT | Performed by: INTERNAL MEDICINE

## 2021-12-22 PROCEDURE — 250N000011 HC RX IP 250 OP 636: Performed by: HOSPITALIST

## 2021-12-22 PROCEDURE — 250N000011 HC RX IP 250 OP 636: Performed by: PHYSICIAN ASSISTANT

## 2021-12-22 RX ADMIN — BENZONATATE 100 MG: 100 CAPSULE, LIQUID FILLED ORAL at 18:41

## 2021-12-22 RX ADMIN — BENZONATATE 100 MG: 100 CAPSULE, LIQUID FILLED ORAL at 09:05

## 2021-12-22 RX ADMIN — GUAIFENESIN AND DEXTROMETHORPHAN HYDROBROMIDE 1 TABLET: 600; 30 TABLET, EXTENDED RELEASE ORAL at 09:05

## 2021-12-22 RX ADMIN — AMLODIPINE BESYLATE 5 MG: 5 TABLET ORAL at 09:05

## 2021-12-22 RX ADMIN — ENOXAPARIN SODIUM 40 MG: 40 INJECTION SUBCUTANEOUS at 21:22

## 2021-12-22 RX ADMIN — LOSARTAN POTASSIUM 50 MG: 50 TABLET, FILM COATED ORAL at 09:05

## 2021-12-22 RX ADMIN — PANTOPRAZOLE SODIUM 40 MG: 40 TABLET, DELAYED RELEASE ORAL at 09:05

## 2021-12-22 RX ADMIN — DEXAMETHASONE 6 MG: 2 TABLET ORAL at 09:05

## 2021-12-22 RX ADMIN — CEFTRIAXONE 1 G: 1 INJECTION, POWDER, FOR SOLUTION INTRAMUSCULAR; INTRAVENOUS at 14:22

## 2021-12-22 RX ADMIN — BARICITINIB 4 MG: 2 TABLET, FILM COATED ORAL at 09:05

## 2021-12-22 RX ADMIN — BENZONATATE 100 MG: 100 CAPSULE, LIQUID FILLED ORAL at 11:51

## 2021-12-22 RX ADMIN — GUAIFENESIN AND DEXTROMETHORPHAN HYDROBROMIDE 1 TABLET: 600; 30 TABLET, EXTENDED RELEASE ORAL at 21:21

## 2021-12-22 RX ADMIN — TRAZODONE HYDROCHLORIDE 100 MG: 100 TABLET ORAL at 21:21

## 2021-12-22 ASSESSMENT — ACTIVITIES OF DAILY LIVING (ADL)
ADLS_ACUITY_SCORE: 3.75

## 2021-12-22 NOTE — PROGRESS NOTES
"RT note    Patient remains on The HFNC @ 30LPM and 70% for PEEP therapy/SOB and WOB.  Skin is intact.  RT to follow    /68 (BP Location: Right arm)   Pulse 60   Temp 98  F (36.7  C) (Oral)   Resp 20   Ht 1.727 m (5' 8\")   Wt 89.3 kg (196 lb 12.8 oz)   SpO2 95%   BMI 29.92 kg/m    Alma Delia Michel, RT on 12/22/2021 at 2:06 AM    "

## 2021-12-22 NOTE — PROGRESS NOTES
SPIRITUAL HEALTH SERVICES Progress Note  [facility] Med[facility] Surg[facility] 5    Attempted three times today to contact pt Nabeel per his length of stay.  This morning Nabeel requested this author to call back in the afternoon.  However, he did not answer his phone this afternoon upon two different attempts.    Plan: [facility] will follow pt and contact him when he is available or per his request.    Donald Mills M.Div., [facility]  Staff   Phone [phone]

## 2021-12-22 NOTE — PLAN OF CARE
Pertinent assessments: HFNC 70% 30 LPM, LS dim, frequent productive cough,     Major Shift Events: uneventful     Treatment Plan: O2 support, barcitinib, tessalon, rocephin, decadron, lovenox, robitussin, wean O2 as pt linda

## 2021-12-22 NOTE — PLAN OF CARE
Pertinent assessments: HFNC 75% 30 LPM, LS dim, frequent productive cough, requesting atarax for sleep tonight.      Major Shift Events: decreased FiO2 to 70% at 1900. Pt sats 97%  now.      Treatment Plan: O2 support, barcitinib, tessalon, rocephin, decadron, lovenox, robitussin, wean O2 as pt linda.    Will cont w/ POC,.

## 2021-12-22 NOTE — PROGRESS NOTES
[facility]  Hospitalist Progress Note    Tucker Hernandez MD 12/22/2021  Text Page      Reason for Stay (Diagnosis): Covid pneumonia         Assessment and Plan:      Summary of Stay: Jung Severino is a 56 year old  male with a PMH significant for HTN, obesity who developed fever and chills on 12/1 and confirmed COVID positive on 12/3. He continued to have intermittent fever (up to 103) and fatigue and was seen in the ED on 12/7; discharged as he was HD stable and not hypoxic. He returned to ED due to concerns for 20 lbs weight loss and episodes of hypoxia (upper 80's) since this weekend.  However he left ED after having only labs and CXR performed given the wait time. PCP called him  and informed him of ARF and asked him to return to the ED. He has not had Covid vaccine.     Work up in the ED  showed hypokalemia, ARF with Bun/Cr of 63/1.97. WBC normal, and concentrated UA. CXR shows opacities c/w PNA.   O2 sats was 88% with activity and 92% at rest. He was started on steroids and supplemental O2 and recommended for admission.      #Acute hypoxic respiratory failure due to COVID 19  # COVID 19 pneumonitis   -  hypoxia initially severe requiring high flow oxygen  - fortunately he has had a significant improvement over the past 24 hours and is now doing well on Oxymizer 9 L  - Inflammatory markers improved  - He aready had a CT angiogram which was negative for acute PE on 12/15  -  Pro calcitonin was .014, in the setting of progressive hypoxia, started empiric antibiotics with ceftriaxone and azithromycin on 12/15.  Stop ceftriaxone after tomorrow's dose.   - Cont Dexamethasone started 12/14--last dose tomorrow  -Completed remdesivir  -  Discussed Baricitintib with patient including the risks and benefits and emergency use authorization information.  He agreed to start this(12/18)  -  Aggressive Pulm toilet, IS  -  Cough support with tessalon/mucinex/robitussin     #Acute renal  "failure   -Resolved with IV hydration     #HTN  -Losartan restarted           # Code status:  Full, patient unsure of his CODE STATUS he would like to think about it further full code for now  # Anticipated discharge date and Disposition:  Improving, potentially discharge in 3 to 5 days with supplemental oxygen when stable on 2 L or less.  # DVT: Lovenox      Entered: Tucker Wileye 12/22/2021, 2:19 PM               Interval History (Subjective):      Continuing to improve.  Energy much improved and self proning.                  Physical Exam:      Last Vital Signs:  /56 (BP Location: Right arm)   Pulse 84   Temp 98  F (36.7  C) (Oral)   Resp 16   Ht 1.727 m (5' 8\")   Wt 89.3 kg (196 lb 12.8 oz)   SpO2 91%   BMI 29.92 kg/m      Vital signs reviewed  General:  Alert, calm, NAD  CV: regular rate and rhythm, no murmurs or rubs  Lungs:  Clear to ascultation bilaterally, normal respiratory effort  HEENT:  Pupil round, equal, conjuctivae, sclerae and lids normal, neck is supple  Abdomen:  Soft, nontender, nondistended, no masses, normal bowel sounds  Extremities:  No edema  Neuro: normal strength and sensation in all 4 extremities, cranial nerves grossly intact  Psychiatric:  Mood and affect within normal limits             Medications:      All current medications were reviewed with changes reflected in problem list.         Data:      All new lab and imaging data was reviewed.     "

## 2021-12-22 NOTE — PLAN OF CARE
Pt up ind. LS clear. Weaned off HFNC, on 9L oxymizer 93-95%. Enc IS, proning, up in chair. Freq cough, on sched meds. Denies SOB, just gets anxious if OOB & sats drop & feels the need to quickly get back to bed to recover. Appetite good, denies nausea.

## 2021-12-22 NOTE — PROGRESS NOTES
"CLINICAL NUTRITION SERVICES - REASSESSMENT NOTE      MALNUTRITION:  % Weight Loss:  > 5% in 1 month (severe malnutrition) --> PTA, unable to update during admit without wt for comparison  % Intake: No decreased intake noted --> per report w/ food from home contribution  Subcutaneous Fat Loss:  Unable to observe  Muscle Loss:  Unable to observe  Fluid Retention:  None documented     Malnutrition Diagnosis: Unable to determine d/t lack of NFPE, previously meeting malnutrition criteria       EVALUATION OF PROGRESS TOWARD GOALS   Diet: Regular, prn Ensure    Intake/Tolerance:  Increase in O2 needs shortly after RD assessment, initially had since been requiring HFNC though able to wean to 9 L oxymizer today.  % intakes since admission per flowsheet review though ordering meals 1-2x/day and no supplement contribution.  Able to connect with patient via phone.  He reports his appetite is \"great\" and he is eating meals TID given he has been consistently getting food from home.  Suspect meeting at least 75% needs orally.        ASSESSED NUTRITION NEEDS PER APPROVED PRACTICE GUIDELINES:     Dosing Weight 89 kg   Estimated Energy Needs: 20-25 Kcal/Kg  Justification: maintenance  Estimated Protein Needs: 1-1.2 g pro/Kg  Justification: preservation of lean body mass  Estimated Fluid Needs: per MD      NEW FINDINGS:   - Medications reviewed including:     amLODIPine  5 mg Oral Daily     baricitinib  4 mg Oral Daily     benzonatate  100 mg Oral TID     cefTRIAXone  1 g Intravenous Q24H     dexamethasone  6 mg Oral Daily     dextromethorphan-guaiFENesin  1 tablet Oral BID     enoxaparin ANTICOAGULANT  40 mg Subcutaneous Q24H     losartan  50 mg Oral Daily     pantoprazole  40 mg Oral QAM AC     traZODone  100 mg Oral At Bedtime        - MEDICATION INSTRUCTIONS -        - Labs reviewed including:  Electrolytes  Potassium (mmol/L)   Date Value   12/22/2021 4.5   12/21/2021 4.1   12/20/2021 4.7   04/03/2020 3.8   03/24/2020 3.2 " (L)   03/05/2018 3.9     Phosphorus (mg/dL)   Date Value   12/21/2016 3.0   12/18/2016 2.3 (L)   12/17/2016 3.2    Blood Glucose  Glucose (mg/dL)   Date Value   12/19/2021 118 (H)   12/16/2021 150 (H)   12/15/2021 166 (H)   12/14/2021 130 (H)   12/14/2021 145 (H)   04/03/2020 149 (H)   03/24/2020 145 (H)   03/05/2018 110 (H)   12/22/2016 105 (H)   12/21/2016 120 (H)     Hemoglobin A1C POCT (%)   Date Value   12/12/2016 5.5    Inflammatory Markers  CRP Inflammation (mg/L)   Date Value   12/17/2021 34.4 (H)   12/16/2021 64.7 (H)   12/15/2021 116.0 (H)     WBC (10e9/L)   Date Value   03/24/2020 9.2   03/05/2018 5.3   12/21/2016 9.6     WBC Count (10e3/uL)   Date Value   12/17/2021 9.4   12/16/2021 6.0   12/15/2021 3.3 (L)     Albumin (g/dL)   Date Value   12/19/2021 2.3 (L)   12/17/2021 2.6 (L)   12/15/2021 2.6 (L)   04/03/2020 3.4   03/24/2020 4.0   12/20/2016 2.8 (L)      Magnesium (mg/dL)   Date Value   12/22/2021 2.0   12/21/2021 2.0   12/20/2021 2.0   04/03/2020 2.0   03/24/2020 1.3 (L)   12/24/2016 2.2     Sodium (mmol/L)   Date Value   12/19/2021 139   12/16/2021 141   12/15/2021 140   04/03/2020 137   03/24/2020 138   03/05/2018 136    Renal  Urea Nitrogen (mg/dL)   Date Value   12/19/2021 25   12/16/2021 28   12/15/2021 43 (H)   04/03/2020 11   03/24/2020 18   03/05/2018 20     Creatinine (mg/dL)   Date Value   12/22/2021 0.97   12/21/2021 0.90   12/19/2021 0.93   04/03/2020 0.91   03/24/2020 0.97   03/05/2018 0.84     Additional  Triglycerides (mg/dL)   Date Value   03/05/2018 194 (H)   12/17/2016 96   08/12/2009 117     Ketones Urine (mg/dL)   Date Value   12/14/2021 Negative   03/24/2020 Negative        -  Weight trending reviewed and no updated weight for comparison:  Vitals:    12/15/21 0010   Weight: 89.3 kg (196 lb 12.8 oz)     - Stooling patterns reviewed.      Previous Goals:   Patient to consume at least 50-75% of meals or supplements TID.   Evaluation: Met    Previous Nutrition Diagnosis:    Inadequate oral intake related to decreased appetite, nausea, and loss of taste smell w/ COVID as evidenced by meeting <50% needs w/ 8% wt loss over the past 4 weeks per report.  Evaluation: Completed      CURRENT NUTRITION DIAGNOSIS  Predicted suboptimal nutrient intake (energy/protein) related to potential for decline in PO intakes during admit pending LOS and respiratory trajectory.     INTERVENTIONS  Recommendations / Nutrition Prescription  Diet per MD.  Encouraged ongoing food from home.    Discontinue prn supplements.    Updated weight during admit as able.    Implementation  Collaboration and Referral of Nutrition care: Discussed POC with team during rounds.      Goals  Patient to consume at least 75% of meals TID.       MONITORING AND EVALUATION:  Progress towards goals will be monitored and evaluated per protocol and Practice Guidelines      Vanessa Nath RDN, LD  Clinical Dietitian  3rd floor/ICU: [phone]  All other floors: [phone]  Weekend/holiday: [phone]  Office: [phone]

## 2021-12-23 LAB
CREAT SERPL-MCNC: 0.86 MG/DL (ref 0.66–1.25)
GFR SERPL CREATININE-BSD FRML MDRD: >90 ML/MIN/1.73M2

## 2021-12-23 PROCEDURE — 250N000013 HC RX MED GY IP 250 OP 250 PS 637: Performed by: INTERNAL MEDICINE

## 2021-12-23 PROCEDURE — 250N000011 HC RX IP 250 OP 636: Performed by: PHYSICIAN ASSISTANT

## 2021-12-23 PROCEDURE — 250N000012 HC RX MED GY IP 250 OP 636 PS 637: Performed by: INTERNAL MEDICINE

## 2021-12-23 PROCEDURE — 36415 COLL VENOUS BLD VENIPUNCTURE: CPT | Performed by: INTERNAL MEDICINE

## 2021-12-23 PROCEDURE — 120N000001 HC R&B MED SURG/OB

## 2021-12-23 PROCEDURE — 99233 SBSQ HOSP IP/OBS HIGH 50: CPT | Performed by: INTERNAL MEDICINE

## 2021-12-23 PROCEDURE — 250N000013 HC RX MED GY IP 250 OP 250 PS 637: Performed by: HOSPITALIST

## 2021-12-23 PROCEDURE — 250N000011 HC RX IP 250 OP 636: Performed by: HOSPITALIST

## 2021-12-23 PROCEDURE — 82565 ASSAY OF CREATININE: CPT | Performed by: INTERNAL MEDICINE

## 2021-12-23 PROCEDURE — 250N000013 HC RX MED GY IP 250 OP 250 PS 637: Performed by: PHYSICIAN ASSISTANT

## 2021-12-23 RX ADMIN — BARICITINIB 4 MG: 2 TABLET, FILM COATED ORAL at 08:51

## 2021-12-23 RX ADMIN — ENOXAPARIN SODIUM 40 MG: 40 INJECTION SUBCUTANEOUS at 22:06

## 2021-12-23 RX ADMIN — HYDROXYZINE HYDROCHLORIDE 10 MG: 10 TABLET ORAL at 00:08

## 2021-12-23 RX ADMIN — BENZONATATE 100 MG: 100 CAPSULE, LIQUID FILLED ORAL at 22:08

## 2021-12-23 RX ADMIN — TRAZODONE HYDROCHLORIDE 100 MG: 100 TABLET ORAL at 22:06

## 2021-12-23 RX ADMIN — LOSARTAN POTASSIUM 50 MG: 50 TABLET, FILM COATED ORAL at 08:50

## 2021-12-23 RX ADMIN — GUAIFENESIN AND DEXTROMETHORPHAN HYDROBROMIDE 1 TABLET: 600; 30 TABLET, EXTENDED RELEASE ORAL at 08:51

## 2021-12-23 RX ADMIN — AMLODIPINE BESYLATE 5 MG: 5 TABLET ORAL at 08:51

## 2021-12-23 RX ADMIN — CEFTRIAXONE 1 G: 1 INJECTION, POWDER, FOR SOLUTION INTRAMUSCULAR; INTRAVENOUS at 14:22

## 2021-12-23 RX ADMIN — BENZONATATE 100 MG: 100 CAPSULE, LIQUID FILLED ORAL at 08:51

## 2021-12-23 RX ADMIN — BENZONATATE 100 MG: 100 CAPSULE, LIQUID FILLED ORAL at 13:12

## 2021-12-23 RX ADMIN — DEXAMETHASONE 6 MG: 2 TABLET ORAL at 08:51

## 2021-12-23 RX ADMIN — GUAIFENESIN AND DEXTROMETHORPHAN HYDROBROMIDE 1 TABLET: 600; 30 TABLET, EXTENDED RELEASE ORAL at 22:06

## 2021-12-23 RX ADMIN — PANTOPRAZOLE SODIUM 40 MG: 40 TABLET, DELAYED RELEASE ORAL at 08:51

## 2021-12-23 ASSESSMENT — ACTIVITIES OF DAILY LIVING (ADL)
ADLS_ACUITY_SCORE: 3.75

## 2021-12-23 NOTE — PROGRESS NOTES
[facility]  Hospitalist Progress Note  Kristel Roth MD 12/23/21    Reason for Stay (Diagnosis): COVID-19 viral pneumonia         Assessment and Plan:      Summary of Stay: Jung Severino is a 56 year old male with past medical history of hypertension and obesity who developed fevers and chills on 12/1 and confirmed COVID-19 positive on 12/3 with progressive symptoms for which she was seen in the ER on 12/7 at which point he was discharged use was stable and not hypoxic but ultimately returned to the ER due to significant weight loss and hypoxia but unfortunately left after having labs and imaging given the prolonged wait time and ultimately his PCP called him to inform him that he had acute renal failure and asked him to come back to the emergency room where he was subsequently admitted on 12/14/2021 with acute hypoxic respiratory failure secondary to COVID-19 viral pneumonia as well as acute kidney injury.  Patient is overall improving, working on weaning supplemental oxygen.    Problem List/Assessment and Plan:     Acute hypoxic respiratory failure secondary to COVID-19 viral pneumonia: Symptoms began on 12/1, confirmed positive on 12/3.  Was seen in the ER on 12/7 but discharged this he was stable.  Again presented to the ER but left after imaging and labs because of prolonged wait times but PCP called him as he had acute renal failure and he was subsequently admitted on 12/14.  CT negative for acute PE but shows bilateral infiltrates consistent with Covid.  He initially had rather severe hypoxia requiring HFNC, now oxygen is improving, today at 8 L.  CRP was 116, has improved to 34.4.  -Wean oxygen as able  -Initiated on baricitinib on 12/18, continue for 14 days versus hospital discharge  -Procalcitonin was 0.14 on admission and patient was initiated on empiric antibiotics with ceftriaxone and azithromycin on 12/15.  He received 5 days of azithromycin and today is day 9 of ceftriaxone, will  "discontinue now.  -He has completed 10 days of Decadron and 5 days of Remdesivir  -Cough support with tessalon/mucinex/robitussin     Acute renal failure - Resolved: Creatinine was 2.03 on admission.  He did receive a small amount of IV fluids and losartan was initially held.  Renal function now back to baseline at about 0.8-1.     HTN: Losartan restarted once renal function normalized.    Mild transaminitis: Likely due to underlying COVID-19 infection.    Steroid-induced hyperglycemia: Mild hyperglycemia.  No recent hemoglobin A1c.  This is likely steroid-induced but will check hemoglobin A1c.    Diet: Combination Diet Regular Diet Adult    DVT Prophylaxis: Enoxaparin (Lovenox) SQ  Monteiro Catheter: Not present  Code Status: Full Code      Disposition Plan   Expected Discharge: 12/28/2021     Anticipated discharge location:  Awaiting care coordination huddle  Delays:     Covid Test Positive  Oxygen Needs         Entered: Kristel Roth MD 12/23/2021, 9:34 AM       The patient's care was discussed with the Bedside Nurse, Patient and Patient's Family.    Hospitalist Service  [facility]          Interval History (Subjective):      Patient was discussed with his bedside nurse this morning.  He states he continues to feel little bit better each day.  He does have a dry cough but no chest pain.  Denies shortness of breath.  He is eating and drinking well without nausea, vomiting or abdominal pain.  I discussed the care plan with him and all of his questions were answered.    I called his wife but she did not answer.  I left a detailed message.                  Physical Exam:      Last Vital Signs:  /75 (BP Location: Right arm)   Pulse 73   Temp 97.5  F (36.4  C) (Oral)   Resp 20   Ht 1.727 m (5' 8\")   Wt 89.3 kg (196 lb 12.8 oz)   SpO2 97%   BMI 29.92 kg/m      General: Alert, awake, no acute distress.  HEENT: Normocephalic and atraumatic, eyes anicteric and without scleral injection, " EOMI, face symmetric, MMM.  Cardiac: RRR, normal S1, S2. No m/g/r, no LE edema.  Pulmonary: Normal chest rise, normal work of breathing.  Lungs CTAB without crackles or wheezing.  Abdomen: soft, non-tender, non-distended.  Normoactive bowel sounds, no guarding or rebound tenderness.  Extremities: no deformities.  Warm, well perfused.  Skin: no rashes or lesions.  Warm and Dry.  Neuro: No focal deficits.  Speech clear.  Coordination and strength grossly normal.  Psych: Alert and oriented x3. Appropriate affect.         Medications:      All current medications were reviewed with changes reflected in problem list.         Data:      All new lab and imaging data was reviewed.   Labs:  Recent Labs   Lab 12/17/21  0901   WBC 9.4   HGB 14.4   HCT 44.1   MCV 96        Recent Labs   Lab 12/23/21  0708 12/22/21  0642 12/21/21  0806 12/20/21  0657 12/19/21  0646 12/18/21  0655 12/17/21  0901   NA  --   --   --   --  139  --   --    POTASSIUM  --  4.5 4.1 4.7 4.2   < > 4.4   CHLORIDE  --   --   --   --  106  --   --    CO2  --   --   --   --  27  --   --    ANIONGAP  --   --   --   --  6  --   --    GLC  --   --   --   --  118*  --   --    BUN  --   --   --   --  25  --   --    CR 0.86 0.97 0.90  --  0.93   < >  --    GFRESTIMATED >90 90 >90  --  90   < >  --    GLADYS  --   --   --   --  9.1  --   --    MAG  --  2.0 2.0 2.0 1.9   < > 1.7   PROTTOTAL  --   --   --   --  6.8  --  7.4   ALBUMIN  --   --   --   --  2.3*  --  2.6*   BILITOTAL  --   --   --   --  0.7  --  0.7   ALKPHOS  --   --   --   --  54  --  61   AST  --   --   --   --  39  --  90*   ALT  --   --   --   --  83*  --  115*    < > = values in this interval not displayed.     Recent Labs   Lab 12/17/21  1304   DD 1.60*     Recent Labs   Lab 12/17/21  0901   CRP 34.4*      Imaging:   No results found for this or any previous visit (from the past 24 hour(s)).    Krsitel Roth MD

## 2021-12-23 NOTE — PLAN OF CARE
Pertinent assessments: infrequent productive cough, pt reports improvement, 8 LPM NC    Major Shift Events: uneventful    Treatment Plan: O2 support, barcitinib, tessalon, rocephin, decadron, lovenox, robutussin

## 2021-12-23 NOTE — PLAN OF CARE
Pt up to BR x1, otherwise in bed. LS  clear. Infreq cough. On 8L oxmizer. Desats when OOB. Appetite good,denies nausea. Enc  proning & IS.

## 2021-12-23 NOTE — PLAN OF CARE
Assessments: A&O x4. SpO2 around 95-97%.  Independent to bedside commode. Denies being winded/dyspneic. Appetite was good, no n/v. No pain.     Major Shift Events: weaned to 8L oxymizer from 9L.     Treatment Plan: baricitinib, tessalon, rocephin, decadron, lovenox, robitussin    Bedside Nurse: Jeronimo Gómez RN

## 2021-12-24 LAB
ALBUMIN SERPL-MCNC: 2.5 G/DL (ref 3.4–5)
ALP SERPL-CCNC: 73 U/L (ref 40–150)
ALT SERPL W P-5'-P-CCNC: 60 U/L (ref 0–70)
ANION GAP SERPL CALCULATED.3IONS-SCNC: 7 MMOL/L (ref 3–14)
AST SERPL W P-5'-P-CCNC: 28 U/L (ref 0–45)
BILIRUB SERPL-MCNC: 0.5 MG/DL (ref 0.2–1.3)
BUN SERPL-MCNC: 23 MG/DL (ref 7–30)
CALCIUM SERPL-MCNC: 8.9 MG/DL (ref 8.5–10.1)
CHLORIDE BLD-SCNC: 106 MMOL/L (ref 94–109)
CO2 SERPL-SCNC: 25 MMOL/L (ref 20–32)
CREAT SERPL-MCNC: 0.84 MG/DL (ref 0.66–1.25)
CRP SERPL-MCNC: <2.9 MG/L (ref 0–8)
ERYTHROCYTE [DISTWIDTH] IN BLOOD BY AUTOMATED COUNT: 12.3 % (ref 10–15)
GFR SERPL CREATININE-BSD FRML MDRD: >90 ML/MIN/1.73M2
GLUCOSE BLD-MCNC: 128 MG/DL (ref 70–99)
HBA1C MFR BLD: 6.3 % (ref 0–5.6)
HCT VFR BLD AUTO: 42.4 % (ref 40–53)
HGB BLD-MCNC: 14.3 G/DL (ref 13.3–17.7)
MAGNESIUM SERPL-MCNC: 2 MG/DL (ref 1.6–2.3)
MCH RBC QN AUTO: 31.8 PG (ref 26.5–33)
MCHC RBC AUTO-ENTMCNC: 33.7 G/DL (ref 31.5–36.5)
MCV RBC AUTO: 94 FL (ref 78–100)
PLATELET # BLD AUTO: 266 10E3/UL (ref 150–450)
PLATELET # BLD AUTO: 266 10E3/UL (ref 150–450)
POTASSIUM BLD-SCNC: 3.8 MMOL/L (ref 3.4–5.3)
PROT SERPL-MCNC: 6.8 G/DL (ref 6.8–8.8)
RBC # BLD AUTO: 4.5 10E6/UL (ref 4.4–5.9)
SODIUM SERPL-SCNC: 138 MMOL/L (ref 133–144)
WBC # BLD AUTO: 8.5 10E3/UL (ref 4–11)

## 2021-12-24 PROCEDURE — 99232 SBSQ HOSP IP/OBS MODERATE 35: CPT | Performed by: INTERNAL MEDICINE

## 2021-12-24 PROCEDURE — 83735 ASSAY OF MAGNESIUM: CPT | Performed by: INTERNAL MEDICINE

## 2021-12-24 PROCEDURE — 86140 C-REACTIVE PROTEIN: CPT | Performed by: INTERNAL MEDICINE

## 2021-12-24 PROCEDURE — 80053 COMPREHEN METABOLIC PANEL: CPT | Performed by: INTERNAL MEDICINE

## 2021-12-24 PROCEDURE — 250N000013 HC RX MED GY IP 250 OP 250 PS 637: Performed by: INTERNAL MEDICINE

## 2021-12-24 PROCEDURE — 250N000013 HC RX MED GY IP 250 OP 250 PS 637: Performed by: HOSPITALIST

## 2021-12-24 PROCEDURE — 120N000001 HC R&B MED SURG/OB

## 2021-12-24 PROCEDURE — 36415 COLL VENOUS BLD VENIPUNCTURE: CPT | Performed by: INTERNAL MEDICINE

## 2021-12-24 PROCEDURE — 83036 HEMOGLOBIN GLYCOSYLATED A1C: CPT | Performed by: INTERNAL MEDICINE

## 2021-12-24 PROCEDURE — 250N000011 HC RX IP 250 OP 636: Performed by: PHYSICIAN ASSISTANT

## 2021-12-24 PROCEDURE — 85027 COMPLETE CBC AUTOMATED: CPT | Performed by: INTERNAL MEDICINE

## 2021-12-24 PROCEDURE — 250N000013 HC RX MED GY IP 250 OP 250 PS 637: Performed by: PHYSICIAN ASSISTANT

## 2021-12-24 RX ADMIN — BENZONATATE 100 MG: 100 CAPSULE, LIQUID FILLED ORAL at 17:54

## 2021-12-24 RX ADMIN — TRAZODONE HYDROCHLORIDE 100 MG: 100 TABLET ORAL at 23:40

## 2021-12-24 RX ADMIN — LOSARTAN POTASSIUM 50 MG: 50 TABLET, FILM COATED ORAL at 08:34

## 2021-12-24 RX ADMIN — GUAIFENESIN AND DEXTROMETHORPHAN HYDROBROMIDE 1 TABLET: 600; 30 TABLET, EXTENDED RELEASE ORAL at 08:34

## 2021-12-24 RX ADMIN — AMLODIPINE BESYLATE 5 MG: 5 TABLET ORAL at 08:34

## 2021-12-24 RX ADMIN — BARICITINIB 4 MG: 2 TABLET, FILM COATED ORAL at 08:34

## 2021-12-24 RX ADMIN — BENZONATATE 100 MG: 100 CAPSULE, LIQUID FILLED ORAL at 08:34

## 2021-12-24 RX ADMIN — GUAIFENESIN AND DEXTROMETHORPHAN HYDROBROMIDE 1 TABLET: 600; 30 TABLET, EXTENDED RELEASE ORAL at 20:30

## 2021-12-24 RX ADMIN — PANTOPRAZOLE SODIUM 40 MG: 40 TABLET, DELAYED RELEASE ORAL at 08:34

## 2021-12-24 RX ADMIN — HYDROXYZINE HYDROCHLORIDE 10 MG: 10 TABLET ORAL at 00:07

## 2021-12-24 RX ADMIN — ENOXAPARIN SODIUM 40 MG: 40 INJECTION SUBCUTANEOUS at 23:40

## 2021-12-24 RX ADMIN — BENZONATATE 100 MG: 100 CAPSULE, LIQUID FILLED ORAL at 13:51

## 2021-12-24 ASSESSMENT — ACTIVITIES OF DAILY LIVING (ADL)
ADLS_ACUITY_SCORE: 3.75
ADLS_ACUITY_SCORE: 4.25
ADLS_ACUITY_SCORE: 3.75
ADLS_ACUITY_SCORE: 4.25
ADLS_ACUITY_SCORE: 3.75

## 2021-12-24 NOTE — PROGRESS NOTES
[facility]  Hospitalist Progress Note  Kristel Roth MD 12/24/21    Reason for Stay (Diagnosis): COVID-19 viral pneumonia         Assessment and Plan:      Summary of Stay: Jung Severino is a 56 year old male with past medical history of hypertension and obesity who developed fevers and chills on 12/1 and confirmed COVID-19 positive on 12/3 with progressive symptoms for which she was seen in the ER on 12/7 at which point he was discharged use was stable and not hypoxic but ultimately returned to the ER due to significant weight loss and hypoxia but unfortunately left after having labs and imaging given the prolonged wait time and ultimately his PCP called him to inform him that he had acute renal failure and asked him to come back to the emergency room where he was subsequently admitted on 12/14/2021 with acute hypoxic respiratory failure secondary to COVID-19 viral pneumonia as well as acute kidney injury.  Patient is overall improving, working on weaning supplemental oxygen.    Problem List/Assessment and Plan:     Acute hypoxic respiratory failure secondary to COVID-19 viral pneumonia: Symptoms began on 12/1, confirmed positive on 12/3.  Was seen in the ER on 12/7 but discharged this he was stable.  Again presented to the ER but left after imaging and labs because of prolonged wait times but PCP called him as he had acute renal failure and he was subsequently admitted on 12/14.  CT negative for acute PE but shows bilateral infiltrates consistent with Covid.  He initially had rather severe hypoxia requiring HFNC, now oxygen is improving, today at 8 L.  CRP was 116, now normal.  -Wean oxygen as able  -Initiated on Baricitinib on 12/18, continue for 14 days versus hospital discharge  -Procalcitonin was 0.14 on admission and patient was initiated on empiric antibiotics with ceftriaxone and azithromycin on 12/15.  He received 5 days of azithromycin and today is day 9 of ceftriaxone, discontinued  "on 12/23  -He has completed 10 days of Decadron and 5 days of Remdesivir  -Cough support with tessalon/mucinex/robitussin     Acute renal failure - Resolved: Creatinine was 2.03 on admission.  He did receive a small amount of IV fluids and losartan was initially held.  Renal function now back to baseline at about 0.8-1.     HTN: Losartan restarted once renal function normalized.    Mild transaminitis - Resolved: Likely due to underlying COVID-19 infection.    Steroid-induced hyperglycemia: Mild hyperglycemia.  No recent hemoglobin A1c.  This is likely steroid-induced but will check hemoglobin A1c.    Diet: Combination Diet Regular Diet Adult    DVT Prophylaxis: Enoxaparin (Lovenox) SQ  Monteiro Catheter: Not present  Code Status: Full Code      Disposition Plan   Expected Discharge: 12/28/2021     Anticipated discharge location:  Awaiting care coordination huddle  Delays:     Covid Test Positive  Oxygen Needs         Entered: Kristel Roth MD 12/24/2021, 10:23 AM       The patient's care was discussed with the Bedside Nurse, Patient and Patient's Family.    Hospitalist Service  [facility]          Interval History (Subjective):      Patient was discussed with his bedside nurse this morning.  He feels about the same as yesterday.  Denies shortness of breath.  Using his incentive spirometer.  When he does use this he does get coughing fits.  Cough is nonproductive.  Denies chest pain.  Eating and drinking well without nausea or vomiting.  No abdominal pain.    I called his wife but she did not answer.  I left a detailed message.                  Physical Exam:      Last Vital Signs:  /72 (BP Location: Right arm)   Pulse 94   Temp 97.4  F (36.3  C) (Axillary)   Resp 18   Ht 1.727 m (5' 8\")   Wt 89.3 kg (196 lb 12.8 oz)   SpO2 91%   BMI 29.92 kg/m      General: Alert, awake, no acute distress.  HEENT: Normocephalic and atraumatic, eyes anicteric and without scleral injection, EOMI, " face symmetric, MMM.  Cardiac: RRR, normal S1, S2. No m/g/r, no LE edema.  Pulmonary: Normal chest rise, normal work of breathing.  Lungs CTAB without crackles or wheezing.  Abdomen: soft, non-tender, non-distended.  Normoactive bowel sounds, no guarding or rebound tenderness.  Extremities: no deformities.  Warm, well perfused.  Skin: no rashes or lesions.  Warm and Dry.  Neuro: No focal deficits.  Speech clear.  Coordination and strength grossly normal.  Psych: Alert and oriented x3. Appropriate affect.         Medications:      All current medications were reviewed with changes reflected in problem list.         Data:      All new lab and imaging data was reviewed.   Labs:  No results for input(s): WBC, HGB, HCT, MCV, PLT in the last 168 hours.  Recent Labs   Lab 12/24/21  0826 12/23/21  0708 12/22/21  0642 12/21/21  0806 12/20/21  0657 12/19/21  0646     --   --   --   --  139   POTASSIUM 3.8  --  4.5 4.1   < > 4.2   CHLORIDE 106  --   --   --   --  106   CO2 25  --   --   --   --  27   ANIONGAP 7  --   --   --   --  6   *  --   --   --   --  118*   BUN 23  --   --   --   --  25   CR 0.84 0.86 0.97 0.90  --  0.93   GFRESTIMATED >90 >90 90 >90  --  90   GLADYS 8.9  --   --   --   --  9.1   MAG 2.0  --  2.0 2.0   < > 1.9   PROTTOTAL 6.8  --   --   --   --  6.8   ALBUMIN 2.5*  --   --   --   --  2.3*   BILITOTAL 0.5  --   --   --   --  0.7   ALKPHOS 73  --   --   --   --  54   AST 28  --   --   --   --  39   ALT 60  --   --   --   --  83*    < > = values in this interval not displayed.     Recent Labs   Lab 12/17/21  1304   DD 1.60*     Recent Labs   Lab 12/24/21  0826   CRP <2.9      Imaging:   No results found for this or any previous visit (from the past 24 hour(s)).    Kristel Roth MD

## 2021-12-24 NOTE — PLAN OF CARE
Assessments: VSS. A&O x4. Weaned to 6L oxymizer cannula, SpO2 on 95%. Desats with exertion. Appetite good, denies nausea. No complaints of pain. Does self-proning & IS.     Treatment Plan: O2 support - wean as able, elton, tessalon, decadron, lovenox, robitussin     Bedside Nurse: Jeronimo Gómez RN

## 2021-12-24 NOTE — PLAN OF CARE
Pt up ind. LS dim. ON 8L oxymizer. Desats when OOB. Enc IS, proning, up to chair, haven't seen him do any. Infreq cough. Appetite good, denies nausea. Voids in urinal. Gen wkns.

## 2021-12-25 LAB
CREAT SERPL-MCNC: 1.01 MG/DL (ref 0.66–1.25)
GFR SERPL CREATININE-BSD FRML MDRD: 86 ML/MIN/1.73M2
MAGNESIUM SERPL-MCNC: 1.9 MG/DL (ref 1.6–2.3)
POTASSIUM BLD-SCNC: 3.5 MMOL/L (ref 3.4–5.3)

## 2021-12-25 PROCEDURE — 250N000013 HC RX MED GY IP 250 OP 250 PS 637: Performed by: HOSPITALIST

## 2021-12-25 PROCEDURE — 120N000001 HC R&B MED SURG/OB

## 2021-12-25 PROCEDURE — 250N000013 HC RX MED GY IP 250 OP 250 PS 637: Performed by: PHYSICIAN ASSISTANT

## 2021-12-25 PROCEDURE — 82565 ASSAY OF CREATININE: CPT | Performed by: INTERNAL MEDICINE

## 2021-12-25 PROCEDURE — 250N000013 HC RX MED GY IP 250 OP 250 PS 637: Performed by: INTERNAL MEDICINE

## 2021-12-25 PROCEDURE — 99232 SBSQ HOSP IP/OBS MODERATE 35: CPT | Performed by: INTERNAL MEDICINE

## 2021-12-25 PROCEDURE — 84132 ASSAY OF SERUM POTASSIUM: CPT | Performed by: INTERNAL MEDICINE

## 2021-12-25 PROCEDURE — 83735 ASSAY OF MAGNESIUM: CPT | Performed by: INTERNAL MEDICINE

## 2021-12-25 PROCEDURE — 36415 COLL VENOUS BLD VENIPUNCTURE: CPT | Performed by: INTERNAL MEDICINE

## 2021-12-25 PROCEDURE — 250N000011 HC RX IP 250 OP 636: Performed by: PHYSICIAN ASSISTANT

## 2021-12-25 RX ORDER — HYDROXYZINE HYDROCHLORIDE 25 MG/1
25 TABLET, FILM COATED ORAL 3 TIMES DAILY PRN
Status: DISCONTINUED | OUTPATIENT
Start: 2021-12-25 | End: 2021-12-28 | Stop reason: HOSPADM

## 2021-12-25 RX ADMIN — HYDROXYZINE HYDROCHLORIDE 25 MG: 25 TABLET, FILM COATED ORAL at 22:21

## 2021-12-25 RX ADMIN — PANTOPRAZOLE SODIUM 40 MG: 40 TABLET, DELAYED RELEASE ORAL at 08:52

## 2021-12-25 RX ADMIN — GUAIFENESIN AND DEXTROMETHORPHAN HYDROBROMIDE 1 TABLET: 600; 30 TABLET, EXTENDED RELEASE ORAL at 22:22

## 2021-12-25 RX ADMIN — BENZONATATE 100 MG: 100 CAPSULE, LIQUID FILLED ORAL at 08:52

## 2021-12-25 RX ADMIN — AMLODIPINE BESYLATE 5 MG: 5 TABLET ORAL at 08:52

## 2021-12-25 RX ADMIN — ENOXAPARIN SODIUM 40 MG: 40 INJECTION SUBCUTANEOUS at 22:21

## 2021-12-25 RX ADMIN — BENZONATATE 100 MG: 100 CAPSULE, LIQUID FILLED ORAL at 18:40

## 2021-12-25 RX ADMIN — GUAIFENESIN AND DEXTROMETHORPHAN HYDROBROMIDE 1 TABLET: 600; 30 TABLET, EXTENDED RELEASE ORAL at 08:52

## 2021-12-25 RX ADMIN — BARICITINIB 4 MG: 2 TABLET, FILM COATED ORAL at 08:52

## 2021-12-25 RX ADMIN — LOSARTAN POTASSIUM 50 MG: 50 TABLET, FILM COATED ORAL at 08:52

## 2021-12-25 RX ADMIN — TRAZODONE HYDROCHLORIDE 100 MG: 100 TABLET ORAL at 22:21

## 2021-12-25 RX ADMIN — BENZONATATE 100 MG: 100 CAPSULE, LIQUID FILLED ORAL at 12:33

## 2021-12-25 ASSESSMENT — ACTIVITIES OF DAILY LIVING (ADL)
ADLS_ACUITY_SCORE: 3.75

## 2021-12-25 ASSESSMENT — MIFFLIN-ST. JEOR: SCORE: 1681.28

## 2021-12-25 NOTE — PROGRESS NOTES
[id number]  VSS, pt on 8L oxymizer canula. Alert and oriented x 4. Up ad Nimo. PIV SL. IS encouraged .Dishcarge pending.

## 2021-12-25 NOTE — PLAN OF CARE
Assessments: up independently in room, lung sounds diminished, 8L oxymizer cannula, using IS, proning 2x per day, up to chair, frequent non productive cough. Appetite good, denies nausea.     Treatment Plan: O2 support - wean as able, barcitinib, tessalon, lovenox, robitussin     Bedside Nurse: Krystal Castorena RN

## 2021-12-25 NOTE — PLAN OF CARE
End of shift summary.  For full set of vital signs and complete assessments, please see documentation flowsheet.    Assessments: Up independently in room. O2 stable on 8L oxymizer cannula. Utilizing IS independently. Self proning encouraged. Frequent non productive cough. Appetite good. Denies nausea. BM this shift, voiding adequately.    Treatment Plan: O2 support - wean as able, barcitinib, tessalon, lovenox, robitussin     Bedside Nurse: Francisca Ly RN

## 2021-12-25 NOTE — PROGRESS NOTES
[facility]  Hospitalist Progress Note  Kristel Roth MD 12/25/21     Reason for Stay (Diagnosis): COVID-19 viral pneumonia         Assessment and Plan:      Summary of Stay: Jung Severino is a 56 year old male with past medical history of hypertension and obesity who developed fevers and chills on 12/1 and confirmed COVID-19 positive on 12/3 with progressive symptoms for which she was seen in the ER on 12/7 at which point he was discharged use was stable and not hypoxic but ultimately returned to the ER due to significant weight loss and hypoxia but unfortunately left after having labs and imaging given the prolonged wait time and ultimately his PCP called him to inform him that he had acute renal failure and asked him to come back to the emergency room where he was subsequently admitted on 12/14/2021 with acute hypoxic respiratory failure secondary to COVID-19 viral pneumonia as well as acute kidney injury.  Patient is overall improving, working on weaning supplemental oxygen.    Problem List/Assessment and Plan:     Acute hypoxic respiratory failure secondary to COVID-19 viral pneumonia: Symptoms began on 12/1, confirmed positive on 12/3.  Was seen in the ER on 12/7 but discharged this he was stable.  Again presented to the ER but left after imaging and labs because of prolonged wait times but PCP called him as he had acute renal failure and he was subsequently admitted on 12/14.  CT negative for acute PE but shows bilateral infiltrates consistent with Covid.  He initially had rather severe hypoxia requiring HFNC, now oxygen is improving, today at 8 L.  CRP was 116, now normal.  -Wean oxygen as able  -Initiated on Baricitinib on 12/18, continue for 14 days versus hospital discharge  -Procalcitonin was 0.14 on admission and patient was initiated on empiric antibiotics with ceftriaxone and azithromycin on 12/15.  He received 5 days of azithromycin and today is day 9 of ceftriaxone, discontinued  "on 12/23  -He has completed 10 days of Decadron and 5 days of Remdesivir  -Cough support with tessalon/mucinex/robitussin     Acute renal failure - Resolved: Creatinine was 2.03 on admission.  He did receive a small amount of IV fluids and losartan was initially held.  Renal function now back to baseline at about 0.8-1.     HTN: Losartan restarted once renal function normalized.    Mild transaminitis - Resolved: Likely due to underlying COVID-19 infection.    Anxiety: PRN Vistaril.    Steroid-induced hyperglycemia with Pre diabetes: Mild hyperglycemia.  No recent hemoglobin A1c.  HgbA1c this admission 6.3 indicating pre diabetes.  Will need to follow up with PCP.     Diet: Combination Diet Regular Diet Adult    DVT Prophylaxis: Enoxaparin (Lovenox) SQ  Monteiro Catheter: Not present  Code Status: Full Code      Disposition Plan   Expected Discharge: 12/28/2021     Anticipated discharge location:  Awaiting care coordination huddle  Delays:     Covid Test Positive  Oxygen Needs         Entered: Kristel Roth MD 12/25/2021, 9:48 AM       The patient's care was discussed with the Bedside Nurse, Patient and Patient's Family.    Hospitalist Service  [facility]          Interval History (Subjective):      Patient was discussed with his bedside nurse this morning. He states he is feeling well. Using the incentive spirometer. Eating and drinking well. No chest pain. He is still on 8 L and when he walked to the bathroom this morning he did desaturate to 84%. He recovers relatively quickly. He is frustrated that is taking a long time to get better but I reassured him that this is fairly typical case with severe Covid.    I called his wife by phone, she did not answer but I left a message with an update.                  Physical Exam:      Last Vital Signs:  /71 (BP Location: Right arm)   Pulse 106   Temp 98.5  F (36.9  C) (Oral)   Resp 18   Ht 1.727 m (5' 8\")   Wt 89.3 kg (196 lb 12.8 oz)   " SpO2 92%   BMI 29.92 kg/m      General: Alert, awake, no acute distress.  HEENT: Normocephalic and atraumatic, eyes anicteric and without scleral injection, EOMI, face symmetric, MMM.  Cardiac: RRR, normal S1, S2. No m/g/r, no LE edema.  Pulmonary: Normal chest rise, normal work of breathing.  Lungs CTAB without crackles or wheezing.  Abdomen: soft, non-tender, non-distended.  Normoactive bowel sounds, no guarding or rebound tenderness.  Extremities: no deformities.  Warm, well perfused.  Skin: no rashes or lesions.  Warm and Dry.  Neuro: No focal deficits.  Speech clear.  Coordination and strength grossly normal.  Psych: Alert and oriented x3. Appropriate affect.         Medications:      All current medications were reviewed with changes reflected in problem list.         Data:      All new lab and imaging data was reviewed.   Labs:  Recent Labs   Lab 12/24/21  1324   WBC 8.5   HGB 14.3   HCT 42.4   MCV 94     266     Recent Labs   Lab 12/25/21  0632 12/24/21  0826 12/23/21  0708 12/22/21  0642 12/21/21  0806 12/20/21  0657 12/19/21  0646   NA  --  138  --   --   --   --  139   POTASSIUM  --  3.8  --  4.5 4.1   < > 4.2   CHLORIDE  --  106  --   --   --   --  106   CO2  --  25  --   --   --   --  27   ANIONGAP  --  7  --   --   --   --  6   GLC  --  128*  --   --   --   --  118*   BUN  --  23  --   --   --   --  25   CR 1.01 0.84 0.86 0.97 0.90  --  0.93   GFRESTIMATED 86 >90 >90 90 >90  --  90   GLADYS  --  8.9  --   --   --   --  9.1   MAG  --  2.0  --  2.0 2.0   < > 1.9   PROTTOTAL  --  6.8  --   --   --   --  6.8   ALBUMIN  --  2.5*  --   --   --   --  2.3*   BILITOTAL  --  0.5  --   --   --   --  0.7   ALKPHOS  --  73  --   --   --   --  54   AST  --  28  --   --   --   --  39   ALT  --  60  --   --   --   --  83*    < > = values in this interval not displayed.     No results for input(s): DD in the last 168 hours.  Recent Labs   Lab 12/24/21  0826   CRP <2.9      Imaging:   No results found for this or  any previous visit (from the past 24 hour(s)).    Kristel Roth MD

## 2021-12-26 LAB
CREAT SERPL-MCNC: 0.91 MG/DL (ref 0.66–1.25)
GFR SERPL CREATININE-BSD FRML MDRD: >90 ML/MIN/1.73M2
HOLD SPECIMEN: NORMAL
MAGNESIUM SERPL-MCNC: 1.9 MG/DL (ref 1.6–2.3)
POTASSIUM BLD-SCNC: 3.8 MMOL/L (ref 3.4–5.3)

## 2021-12-26 PROCEDURE — 250N000011 HC RX IP 250 OP 636: Performed by: PHYSICIAN ASSISTANT

## 2021-12-26 PROCEDURE — 99232 SBSQ HOSP IP/OBS MODERATE 35: CPT | Performed by: INTERNAL MEDICINE

## 2021-12-26 PROCEDURE — 83735 ASSAY OF MAGNESIUM: CPT | Performed by: INTERNAL MEDICINE

## 2021-12-26 PROCEDURE — 36415 COLL VENOUS BLD VENIPUNCTURE: CPT | Performed by: INTERNAL MEDICINE

## 2021-12-26 PROCEDURE — 250N000013 HC RX MED GY IP 250 OP 250 PS 637: Performed by: INTERNAL MEDICINE

## 2021-12-26 PROCEDURE — 84132 ASSAY OF SERUM POTASSIUM: CPT | Performed by: INTERNAL MEDICINE

## 2021-12-26 PROCEDURE — 250N000013 HC RX MED GY IP 250 OP 250 PS 637: Performed by: PHYSICIAN ASSISTANT

## 2021-12-26 PROCEDURE — 120N000001 HC R&B MED SURG/OB

## 2021-12-26 PROCEDURE — 82565 ASSAY OF CREATININE: CPT | Performed by: INTERNAL MEDICINE

## 2021-12-26 PROCEDURE — 250N000013 HC RX MED GY IP 250 OP 250 PS 637: Performed by: HOSPITALIST

## 2021-12-26 RX ADMIN — GUAIFENESIN AND DEXTROMETHORPHAN HYDROBROMIDE 1 TABLET: 600; 30 TABLET, EXTENDED RELEASE ORAL at 08:14

## 2021-12-26 RX ADMIN — AMLODIPINE BESYLATE 5 MG: 5 TABLET ORAL at 08:13

## 2021-12-26 RX ADMIN — BENZONATATE 100 MG: 100 CAPSULE, LIQUID FILLED ORAL at 11:37

## 2021-12-26 RX ADMIN — GUAIFENESIN AND DEXTROMETHORPHAN HYDROBROMIDE 1 TABLET: 600; 30 TABLET, EXTENDED RELEASE ORAL at 22:36

## 2021-12-26 RX ADMIN — BARICITINIB 4 MG: 2 TABLET, FILM COATED ORAL at 08:14

## 2021-12-26 RX ADMIN — BENZONATATE 100 MG: 100 CAPSULE, LIQUID FILLED ORAL at 08:13

## 2021-12-26 RX ADMIN — ENOXAPARIN SODIUM 40 MG: 40 INJECTION SUBCUTANEOUS at 21:27

## 2021-12-26 RX ADMIN — LOSARTAN POTASSIUM 50 MG: 50 TABLET, FILM COATED ORAL at 08:13

## 2021-12-26 RX ADMIN — PANTOPRAZOLE SODIUM 40 MG: 40 TABLET, DELAYED RELEASE ORAL at 08:13

## 2021-12-26 RX ADMIN — TRAZODONE HYDROCHLORIDE 100 MG: 100 TABLET ORAL at 22:36

## 2021-12-26 RX ADMIN — BENZONATATE 100 MG: 100 CAPSULE, LIQUID FILLED ORAL at 19:49

## 2021-12-26 RX ADMIN — HYDROXYZINE HYDROCHLORIDE 25 MG: 25 TABLET, FILM COATED ORAL at 22:36

## 2021-12-26 ASSESSMENT — ACTIVITIES OF DAILY LIVING (ADL)
ADLS_ACUITY_SCORE: 3.75

## 2021-12-26 NOTE — PLAN OF CARE
End of shift summary.  For full set of vital signs and complete assessments, please see documentation flowsheet.    Assessments: Up independently in room. O2 stable on 5L oxymizer cannula. Desats with activity, able to recover quickly. No complaint of pain/nausea. Using IS. Good appetite. Voiding adequately. Lung sounds diminished with fine crackles to bases.    Treatment Plan: O2 support, barcitinib, tessalon, lovenox, robitussin     Bedside Nurse: Francisca Ly RN

## 2021-12-26 NOTE — PROGRESS NOTES
[facility]  Hospitalist Progress Note  Kristel Roth MD 12/25/21     Reason for Stay (Diagnosis): COVID-19 viral pneumonia         Assessment and Plan:      Summary of Stay: Jung Severino is a 56 year old male with past medical history of hypertension and obesity who developed fevers and chills on 12/1 and confirmed COVID-19 positive on 12/3 with progressive symptoms for which she was seen in the ER on 12/7 at which point he was discharged use was stable and not hypoxic but ultimately returned to the ER due to significant weight loss and hypoxia but unfortunately left after having labs and imaging given the prolonged wait time and ultimately his PCP called him to inform him that he had acute renal failure and asked him to come back to the emergency room where he was subsequently admitted on 12/14/2021 with acute hypoxic respiratory failure secondary to COVID-19 viral pneumonia as well as acute kidney injury.  Patient is overall improving, working on weaning supplemental oxygen.    Problem List/Assessment and Plan:     Acute hypoxic respiratory failure secondary to COVID-19 viral pneumonia: Symptoms began on 12/1, confirmed positive on 12/3.  Was seen in the ER on 12/7 but discharged this he was stable.  Again presented to the ER but left after imaging and labs because of prolonged wait times but PCP called him as he had acute renal failure and he was subsequently admitted on 12/14.  CT negative for acute PE but shows bilateral infiltrates consistent with Covid.  He initially had rather severe hypoxia requiring HFNC, now oxygen is improving, today at 6 L.  CRP was 116, now normal.  -Wean oxygen as able  -Initiated on Baricitinib on 12/18, continue for 14 days versus hospital discharge  -Procalcitonin was 0.14 on admission and patient was initiated on empiric antibiotics with ceftriaxone and azithromycin on 12/15.  He received 5 days of azithromycin and today is day 9 of ceftriaxone, discontinued  Patient called. She states she is very fatigued every day. She has no energy to do anything.  even has to do the cooking. She struggles to stay awake during the day and then is ready for bed by 6:30. This has been going on for several months.  Patient is asking what might be causing this and what dr birch would recommend.  Please advise.   "on 12/23  -He has completed 10 days of Decadron and 5 days of Remdesivir  -Cough support with tessalon/mucinex/robitussin     Acute renal failure - Resolved: Creatinine was 2.03 on admission.  He did receive a small amount of IV fluids and losartan was initially held.  Renal function now back to baseline at about 0.8-1.     HTN: Losartan restarted once renal function normalized.    Mild transaminitis - Resolved: Likely due to underlying COVID-19 infection.    Anxiety: PRN Vistaril.    Steroid-induced hyperglycemia with Pre diabetes: Mild hyperglycemia.  No recent hemoglobin A1c.  HgbA1c this admission 6.3 indicating pre diabetes.  Will need to follow up with PCP.     Diet: Combination Diet Regular Diet Adult    DVT Prophylaxis: Enoxaparin (Lovenox) SQ  Monteiro Catheter: Not present  Code Status: Full Code      Disposition Plan   Expected Discharge: 12/28/2021     Anticipated discharge location:  Awaiting care coordination huddle  Delays:     Covid Test Positive  Oxygen Needs         Entered: Kristel Roth MD 12/26/2021, 10:12 AM       The patient's care was discussed with the Bedside Nurse, Patient and Patient's Family.    Hospitalist Service  [facility]          Interval History (Subjective):      Patient was discussed with his bedside nurse this morning.  He is feeling well today.  He slept very well after he received Vistaril last night.  Not having shortness of breath or chest pain.  Eating and drinking well.  He is very happy that we are able to wean his oxygen to 6 L this morning.    I called his wife by phone, she did not answer but I left a message with an update.                  Physical Exam:      Last Vital Signs:  /85 (BP Location: Right arm)   Pulse 110   Temp 98.6  F (37  C) (Oral)   Resp 20   Ht 1.727 m (5' 8\")   Wt 88.7 kg (195 lb 8 oz)   SpO2 95%   BMI 29.73 kg/m      General: Alert, awake, no acute distress.  HEENT: Normocephalic and atraumatic, eyes anicteric " and without scleral injection, EOMI, face symmetric, MMM.  Cardiac: RRR, normal S1, S2. No m/g/r, no LE edema.  Pulmonary: Normal chest rise, normal work of breathing.  Lungs CTAB without crackles or wheezing.  Abdomen: soft, non-tender, non-distended.  Normoactive bowel sounds, no guarding or rebound tenderness.  Extremities: no deformities.  Warm, well perfused.  Skin: no rashes or lesions.  Warm and Dry.  Neuro: No focal deficits.  Speech clear.  Coordination and strength grossly normal.  Psych: Alert and oriented x3. Appropriate affect.         Medications:      All current medications were reviewed with changes reflected in problem list.         Data:      All new lab and imaging data was reviewed.   Labs:  Recent Labs   Lab 12/24/21  1324   WBC 8.5   HGB 14.3   HCT 42.4   MCV 94     266     Recent Labs   Lab 12/26/21  0716 12/25/21  1750 12/25/21  0632 12/24/21  0826   NA  --   --   --  138   POTASSIUM 3.8 3.5  --  3.8   CHLORIDE  --   --   --  106   CO2  --   --   --  25   ANIONGAP  --   --   --  7   GLC  --   --   --  128*   BUN  --   --   --  23   CR 0.91  --  1.01 0.84   GFRESTIMATED >90  --  86 >90   GLADYS  --   --   --  8.9   MAG 1.9 1.9  --  2.0   PROTTOTAL  --   --   --  6.8   ALBUMIN  --   --   --  2.5*   BILITOTAL  --   --   --  0.5   ALKPHOS  --   --   --  73   AST  --   --   --  28   ALT  --   --   --  60     No results for input(s): DD in the last 168 hours.  Recent Labs   Lab 12/24/21  0826   CRP <2.9      Imaging:   No results found for this or any previous visit (from the past 24 hour(s)).    Kristel Roth MD

## 2021-12-26 NOTE — PLAN OF CARE
End of shift summary.  For full set of vital signs and complete assessments, please see documentation flowsheet.    Assessments: Up independently in room. O2 stable on 8L oxymizer cannula. Desats with ambulation but is able to recover quickly. No complaint of pain/nausea, Using IS. Good appetite. Voiding adequately. Lung sounds diminished with fine crackles to bases     Treatment Plan: O2 support - weaned to 6  liters oxygen this morning at 0600, barcitinib, tessalon, lovenox, robitussin     Bedside Nurse: Krystal Castorena RN

## 2021-12-26 NOTE — PLAN OF CARE
To Do:  End of shift summary.  For full set of vital signs and complete assessments, please see documentation flowsheet.    Assessments: Up independently in room. O2 stable on 8L oxymizer cannula. Desats to 86% with ambulation but is able to recover quickly. No complaint of pain. Declines robitussin. Using IS. Good appetite. Voiding adequately. LS diminished and crackles. PRN Atarax administered.    Treatment Plan: O2 support - wean as able, barcitinib, tessalon, lovenox, robitussin     Bedside Nurse: Hansa Nolen RN

## 2021-12-27 LAB
CREAT SERPL-MCNC: 0.93 MG/DL (ref 0.66–1.25)
GFR SERPL CREATININE-BSD FRML MDRD: >90 ML/MIN/1.73M2
MAGNESIUM SERPL-MCNC: 2 MG/DL (ref 1.6–2.3)
PLATELET # BLD AUTO: 205 10E3/UL (ref 150–450)
POTASSIUM BLD-SCNC: 3.7 MMOL/L (ref 3.4–5.3)

## 2021-12-27 PROCEDURE — 250N000013 HC RX MED GY IP 250 OP 250 PS 637: Performed by: HOSPITALIST

## 2021-12-27 PROCEDURE — 83735 ASSAY OF MAGNESIUM: CPT | Performed by: INTERNAL MEDICINE

## 2021-12-27 PROCEDURE — 250N000013 HC RX MED GY IP 250 OP 250 PS 637: Performed by: INTERNAL MEDICINE

## 2021-12-27 PROCEDURE — 82565 ASSAY OF CREATININE: CPT | Performed by: INTERNAL MEDICINE

## 2021-12-27 PROCEDURE — 120N000001 HC R&B MED SURG/OB

## 2021-12-27 PROCEDURE — 84132 ASSAY OF SERUM POTASSIUM: CPT | Performed by: INTERNAL MEDICINE

## 2021-12-27 PROCEDURE — 99233 SBSQ HOSP IP/OBS HIGH 50: CPT | Performed by: INTERNAL MEDICINE

## 2021-12-27 PROCEDURE — 85049 AUTOMATED PLATELET COUNT: CPT | Performed by: INTERNAL MEDICINE

## 2021-12-27 PROCEDURE — 36415 COLL VENOUS BLD VENIPUNCTURE: CPT | Performed by: INTERNAL MEDICINE

## 2021-12-27 PROCEDURE — 250N000011 HC RX IP 250 OP 636: Performed by: PHYSICIAN ASSISTANT

## 2021-12-27 PROCEDURE — 250N000013 HC RX MED GY IP 250 OP 250 PS 637: Performed by: PHYSICIAN ASSISTANT

## 2021-12-27 RX ADMIN — BENZONATATE 100 MG: 100 CAPSULE, LIQUID FILLED ORAL at 12:04

## 2021-12-27 RX ADMIN — GUAIFENESIN AND DEXTROMETHORPHAN HYDROBROMIDE 1 TABLET: 600; 30 TABLET, EXTENDED RELEASE ORAL at 21:47

## 2021-12-27 RX ADMIN — AMLODIPINE BESYLATE 5 MG: 5 TABLET ORAL at 09:33

## 2021-12-27 RX ADMIN — TRAZODONE HYDROCHLORIDE 100 MG: 100 TABLET ORAL at 21:47

## 2021-12-27 RX ADMIN — ENOXAPARIN SODIUM 40 MG: 40 INJECTION SUBCUTANEOUS at 21:47

## 2021-12-27 RX ADMIN — BENZONATATE 100 MG: 100 CAPSULE, LIQUID FILLED ORAL at 09:33

## 2021-12-27 RX ADMIN — BENZONATATE 100 MG: 100 CAPSULE, LIQUID FILLED ORAL at 17:55

## 2021-12-27 RX ADMIN — LOSARTAN POTASSIUM 50 MG: 50 TABLET, FILM COATED ORAL at 09:33

## 2021-12-27 RX ADMIN — HYDROXYZINE HYDROCHLORIDE 25 MG: 25 TABLET, FILM COATED ORAL at 22:07

## 2021-12-27 RX ADMIN — BARICITINIB 4 MG: 2 TABLET, FILM COATED ORAL at 09:33

## 2021-12-27 RX ADMIN — PANTOPRAZOLE SODIUM 40 MG: 40 TABLET, DELAYED RELEASE ORAL at 09:32

## 2021-12-27 RX ADMIN — GUAIFENESIN AND DEXTROMETHORPHAN HYDROBROMIDE 1 TABLET: 600; 30 TABLET, EXTENDED RELEASE ORAL at 09:32

## 2021-12-27 ASSESSMENT — ACTIVITIES OF DAILY LIVING (ADL)
ADLS_ACUITY_SCORE: 3.75

## 2021-12-27 NOTE — PLAN OF CARE
Assessments: On 4LO2 nasal cannula. Intermittent cough. Off iso, recovered COVID. Good urine output. Had 1 BM. LS crackles and coarse.     Treatment Plan: O2 supplement - wean as able, baricitinib, lovenox    Bedside Nurse: Hansa Nolen RN

## 2021-12-27 NOTE — PLAN OF CARE
Assessments: VSS afebrile, O2 at 4L, sats 96-98%,  Desats with exertion/ambulation to bathroom with good recovery. Denies pain/nausea, Using  IS and self proning     Treatment Plan: O2 supplement - wean as able---decreased oxygen to 3 liters this morning, baricitinib, lovenox    Bedside Nurse: Krystal Castorena RN

## 2021-12-27 NOTE — PLAN OF CARE
Assessments: Weaned O2 to 4L, patient maintains around mid 90s.  Desats with exertion/ambulation to bathroom with good recovery. Denies pain, n/v. Uses IS and  self-prones.     Treatment Plan: O2 supplement - wean as able, baricitinib, lovenox, antitussives    Bedside Nurse: Jeronimo Gómez RN

## 2021-12-27 NOTE — PROGRESS NOTES
[facility]  Hospitalist Progress Note  Vianney Mas MD, MD 12/25/21     Reason for Stay (Diagnosis): COVID-19 viral pneumonia         Assessment and Plan:      Summary of Stay: Jung Severino is a 56 year old male with past medical history of hypertension and obesity who developed fevers and chills on 12/1 and confirmed COVID-19 positive on 12/3 with progressive symptoms for which she was seen in the ER on 12/7 at which point he was discharged use was stable and not hypoxic but ultimately returned to the ER due to significant weight loss and hypoxia but unfortunately left after having labs and imaging given the prolonged wait time and ultimately his PCP called him to inform him that he had acute renal failure and asked him to come back to the emergency room where he was subsequently admitted on 12/14/2021 with acute hypoxic respiratory failure secondary to COVID-19 viral pneumonia as well as acute kidney injury.  Patient is overall improving, working on weaning supplemental oxygen.    Problem List/Assessment and Plan:     Acute hypoxic respiratory failure secondary to COVID-19 viral pneumonia: Symptoms began on 12/1, confirmed positive on 12/3.  Was seen in the ER on 12/7 but discharged this he was stable.  Again presented to the ER but left after imaging and labs because of prolonged wait times but PCP called him as he had acute renal failure and he was subsequently admitted on 12/14.  CT negative for acute PE but shows bilateral infiltrates consistent with Covid. He initially had rather severe hypoxia requiring HFNC, now oxygen is improving, today at 6 L.  CRP was 116, now normal.  -Wean oxygen as able  -Initiated on Baricitinib on 12/18, continue for 14 days versus hospital discharge  -Procalcitonin was 0.14 on admission and patient was initiated on empiric antibiotics with ceftriaxone and azithromycin on 12/15.  He received 5 days of azithromycin and 9 day of ceftriaxone, discontinued  "on 12/23  -He has completed 10 days of Decadron and 5 days of Remdesivir  -Cough support with tessalon/mucinex/robitussin  -Currently on oxygen 4 lpm. Wean off oxygen as able. Anticipate discharge likely tomorrow. Will likely require home oxygen      Acute renal failure - Resolved: Creatinine was 2.03 on admission.  He did receive a small amount of IV fluids and losartan was initially held.  Renal function now back to baseline at about 0.8-1.     HTN: Losartan restarted once renal function normalized.    Mild transaminitis - Resolved: Likely due to underlying COVID-19 infection.    Anxiety: PRN Vistaril.    Steroid-induced hyperglycemia with Pre diabetes: Mild hyperglycemia.  No recent hemoglobin A1c.  HgbA1c this admission 6.3 indicating pre diabetes.  Will need to follow up with PCP.     Diet: Combination Diet Regular Diet Adult    DVT Prophylaxis: Enoxaparin (Lovenox) SQ  Monteiro Catheter: Not present  Code Status: Full Code      Disposition Plan   Expected Discharge: 12/30/2021     Anticipated discharge location:  Awaiting care coordination huddle  Delays:     Covid Test Positive  Oxygen Needs         Entered: Vianney Mas MD, MD 12/27/2021, 2:53 PM       The patient's care was discussed with the Bedside Nurse, Patient and Patient's Family.    Hospitalist Service  [facility]          Interval History (Subjective):      Patient seen and examined. He stated that he is feeling better. Breathing gradually improving. Denies chest pain.                  Physical Exam:      Last Vital Signs:  /75 (BP Location: Right arm)   Pulse 113   Temp 98.6  F (37  C) (Oral)   Resp 20   Ht 1.727 m (5' 8\")   Wt 88.7 kg (195 lb 8 oz)   SpO2 94%   BMI 29.73 kg/m      General: Alert, awake, no acute distress.  HEENT: Normocephalic and atraumatic, eyes anicteric and without scleral injection, EOMI, face symmetric, MMM.  Cardiac: RRR, normal S1, S2. No m/g/r, no LE edema.  Pulmonary: Normal " chest rise, normal work of breathing.  Lungs CTAB without crackles or wheezing.  Abdomen: soft, non-tender, non-distended.  Normoactive bowel sounds, no guarding or rebound tenderness.  Extremities: no deformities.  Warm, well perfused.  Skin: no rashes or lesions.  Warm and Dry.  Neuro: No focal deficits.  Speech clear.  Coordination and strength grossly normal.  Psych: Alert and oriented x3. Appropriate affect.         Medications:      All current medications were reviewed with changes reflected in problem list.         Data:      All new lab and imaging data was reviewed.   Labs:  Recent Labs   Lab 12/27/21  0626 12/24/21  1324   WBC  --  8.5   HGB  --  14.3   HCT  --  42.4   MCV  --  94    266  266     Recent Labs   Lab 12/27/21  0626 12/26/21  0716 12/25/21  1750 12/25/21  0632 12/24/21  0826   NA  --   --   --   --  138   POTASSIUM 3.7 3.8 3.5  --  3.8   CHLORIDE  --   --   --   --  106   CO2  --   --   --   --  25   ANIONGAP  --   --   --   --  7   GLC  --   --   --   --  128*   BUN  --   --   --   --  23   CR 0.93 0.91  --  1.01 0.84   GFRESTIMATED >90 >90  --  86 >90   GLADYS  --   --   --   --  8.9   MAG 2.0 1.9 1.9  --  2.0   PROTTOTAL  --   --   --   --  6.8   ALBUMIN  --   --   --   --  2.5*   BILITOTAL  --   --   --   --  0.5   ALKPHOS  --   --   --   --  73   AST  --   --   --   --  28   ALT  --   --   --   --  60     No results for input(s): DD in the last 168 hours.  Recent Labs   Lab 12/24/21  0826   CRP <2.9      Imaging:   No results found for this or any previous visit (from the past 24 hour(s)).

## 2021-12-28 VITALS
RESPIRATION RATE: 22 BRPM | WEIGHT: 195.5 LBS | HEART RATE: 115 BPM | HEIGHT: 68 IN | BODY MASS INDEX: 29.63 KG/M2 | OXYGEN SATURATION: 93 % | SYSTOLIC BLOOD PRESSURE: 127 MMHG | TEMPERATURE: 98.8 F | DIASTOLIC BLOOD PRESSURE: 84 MMHG

## 2021-12-28 LAB
CREAT SERPL-MCNC: 0.91 MG/DL (ref 0.66–1.25)
GFR SERPL CREATININE-BSD FRML MDRD: >90 ML/MIN/1.73M2
MAGNESIUM SERPL-MCNC: 2 MG/DL (ref 1.6–2.3)
POTASSIUM BLD-SCNC: 4.4 MMOL/L (ref 3.4–5.3)

## 2021-12-28 PROCEDURE — 83735 ASSAY OF MAGNESIUM: CPT | Performed by: INTERNAL MEDICINE

## 2021-12-28 PROCEDURE — 250N000013 HC RX MED GY IP 250 OP 250 PS 637: Performed by: PHYSICIAN ASSISTANT

## 2021-12-28 PROCEDURE — 82565 ASSAY OF CREATININE: CPT | Performed by: INTERNAL MEDICINE

## 2021-12-28 PROCEDURE — 250N000013 HC RX MED GY IP 250 OP 250 PS 637: Performed by: INTERNAL MEDICINE

## 2021-12-28 PROCEDURE — 250N000013 HC RX MED GY IP 250 OP 250 PS 637: Performed by: HOSPITALIST

## 2021-12-28 PROCEDURE — 99207 PR CDG-MDM COMPONENT: MEETS MODERATE - UP CODED: CPT | Performed by: INTERNAL MEDICINE

## 2021-12-28 PROCEDURE — 36415 COLL VENOUS BLD VENIPUNCTURE: CPT | Performed by: INTERNAL MEDICINE

## 2021-12-28 PROCEDURE — 84132 ASSAY OF SERUM POTASSIUM: CPT | Performed by: INTERNAL MEDICINE

## 2021-12-28 PROCEDURE — 99239 HOSP IP/OBS DSCHRG MGMT >30: CPT | Performed by: INTERNAL MEDICINE

## 2021-12-28 RX ORDER — LOSARTAN POTASSIUM 50 MG/1
50 TABLET ORAL DAILY
Qty: 30 TABLET | Refills: 0 | Status: SHIPPED | OUTPATIENT
Start: 2021-12-28 | End: 2022-01-20

## 2021-12-28 RX ADMIN — LOSARTAN POTASSIUM 50 MG: 50 TABLET, FILM COATED ORAL at 09:28

## 2021-12-28 RX ADMIN — BARICITINIB 4 MG: 2 TABLET, FILM COATED ORAL at 09:28

## 2021-12-28 RX ADMIN — BENZONATATE 100 MG: 100 CAPSULE, LIQUID FILLED ORAL at 09:28

## 2021-12-28 RX ADMIN — AMLODIPINE BESYLATE 5 MG: 5 TABLET ORAL at 09:28

## 2021-12-28 RX ADMIN — PANTOPRAZOLE SODIUM 40 MG: 40 TABLET, DELAYED RELEASE ORAL at 09:28

## 2021-12-28 RX ADMIN — BENZONATATE 100 MG: 100 CAPSULE, LIQUID FILLED ORAL at 12:02

## 2021-12-28 RX ADMIN — GUAIFENESIN AND DEXTROMETHORPHAN HYDROBROMIDE 1 TABLET: 600; 30 TABLET, EXTENDED RELEASE ORAL at 09:27

## 2021-12-28 ASSESSMENT — ACTIVITIES OF DAILY LIVING (ADL)
ADLS_ACUITY_SCORE: 3.75

## 2021-12-28 NOTE — PLAN OF CARE
To Do:  End of Shift Summary  For vital signs and complete assessments, please see documentation flowsheets.     [id number]    Pertinent assessments: VSS, weaned from 4 L NC to 2 L NC overnight (0000). Maintaining O2 above 92%. Lung sounds dim. Mild cough with stimulation (taking deep breaths when listening to LS, ect.).    Treatment Plan: Lovenox, wean O2, baricitinib, and monitor vitals.    Bedside Nurse: Korina Fleming RN

## 2021-12-28 NOTE — PLAN OF CARE
Pt hospitalized for COVID.  Discharge education provided to patient and patient's wife, both verbalized understanding of medications and orders.  Medications filled at [facility] pharmacy.   Pt verbalized will follow up with PCP.  Patient discharging to home and transportation provided by wife, Porsche. Educated both on home oxygen tank. No further questions at this time.

## 2021-12-28 NOTE — CONSULTS
Care Management Discharge Note    Discharge Date: 12/28/2021  Expected Time of Departure: discharge with oxygen?    Discharge Disposition: Home,DME    Discharge Services: None    Discharge DME: Oxygen    Private pay costs discussed: Not applicable    Patient/Family in Agreement with the Plan: yes    Handoff Referral Completed: Yes    Additional Information:  CM following for new home oxygen needs. Provider has placed orders for home oxygen with face to face documentation. Patient's SpO2 readings at rest and with activity have been completed and need to be documented in a note by staff.   Called [facility] (E) [phone] to make them aware of new orders, spoke with Margot.   Once nursing documentation is complete, [facility] will review documentation and will call back if any issues with orders or documentation. Updated bedside RN.    Update 1155: Nursing documentation completed. [facility] will follow-up with patient to discuss process, offer choice and update AVS. Anticipate oxygen delivery to the hospital within 2 hours.       Debby Xavier, RN      Debby Xavier RN Case Manager  Inpatient Care Coordination  [facility]   [phone]

## 2021-12-28 NOTE — PROGRESS NOTES
Oxygen Documentation:   I certify that this patient, Jung Severino has been under my care (or a nurse practitioner or physican's assistant working with me). This is the face-to-face encounter for oxygen medical necessity.      Jung Severino is now in a chronic stable state and continues to require supplemental oxygen. Patient has continued oxygen desaturation due to Covid pneumonia.    Alternative treatment(s) tried or considered and deemed clinically infective for treatment of Covid pneumonia include nebulizers, inhalers, steroids, and pulmonary toileting.  If portability is ordered, is the patient mobile within the home? yes    **Patients who qualify for home O2 coverage under the CMS guidelines require ABG tests or O2 sat readings obtained closest to, but no earlier than 2 days prior to the discharge, as evidence of the need for home oxygen therapy. Testing must be performed while patient is in the chronic stable state. See notes for O2 sats.**

## 2021-12-28 NOTE — PLAN OF CARE
Pertinent assessments: VSS on 4L NC. Afebrile. Up independently. Tolerating a regular diet. A&Ox4. Dyspnea on exertion. LS diminished.    Major Shift Events: none.    Treatment Plan: Lovenox, wean O2, baricitinib, and monitor vitals.

## 2021-12-28 NOTE — DISCHARGE SUMMARY
[facility]    Discharge Summary  Hospitalist    Date of Admission:  12/14/2021  Date of Discharge:  12/28/2021  Discharging Provider: Vianney Mas MD  Date of Service (when I saw the patient): 12/28/21    Discharge Diagnoses      Acute hypoxic respiratory failure secondary to COVID-19 viral pneumonia     Acute renal failure - Resolved     Hypertension     Mild transaminitis - Resolved     Anxiety     Steroid-induced hyperglycemia with Pre diabetes: Mild hyperglycemia      History of Present Illness   Jung Severino is an 56 year old male who presented with Covid pneumonia.     Hospital Course   Summary of Stay: Jung Severino is a 56 year old male with past medical history of hypertension and obesity who developed fevers and chills on 12/1 and confirmed COVID-19 positive on 12/3 with progressive symptoms for which she was seen in the ER on 12/7 at which point he was discharged use was stable and not hypoxic but ultimately returned to the ER due to significant weight loss and hypoxia but unfortunately left after having labs and imaging given the prolonged wait time and ultimately his PCP called him to inform him that he had acute renal failure and asked him to come back to the emergency room where he was subsequently admitted on 12/14/2021 with acute hypoxic respiratory failure secondary to COVID-19 viral pneumonia as well as acute kidney injury.  Patient is overall improving, working on weaning supplemental oxygen.     Problem List/Assessment and Plan:      Acute hypoxic respiratory failure secondary to COVID-19 viral pneumonia: Symptoms began on 12/1, confirmed positive on 12/3.  Was seen in the ER on 12/7 but discharged this he was stable.  Again presented to the ER but left after imaging and labs because of prolonged wait times but PCP called him as he had acute renal failure and he was subsequently admitted on 12/14.  CT negative for acute PE but shows bilateral  infiltrates consistent with Covid. He initially had rather severe hypoxia requiring HFNC, now oxygen is improving, today at 6 L.  CRP was 116, now normal.  -Wean oxygen as able  -Initiated on Baricitinib on 12/18, continue for 14 days versus hospital discharge  -Procalcitonin was 0.14 on admission and patient was initiated on empiric antibiotics with ceftriaxone and azithromycin on 12/15.  He received 5 days of azithromycin and 9 day of ceftriaxone, discontinued on 12/23  -He has completed 10 days of Decadron and 5 days of Remdesivir  -Cough support with tessalon/mucinex/robitussin  -Discharged on home oxygen 3 LPM  -Discharged on Eliquis 2.5 mg bid for 1 month for DVT prophylaxis     Acute renal failure - Resolved: Creatinine was 2.03 on admission.  He did receive a small amount of IV fluids and losartan was initially held.  Renal function now back to baseline at about 0.8-1.     HTN: Losartan restarted once renal function normalized.     Mild transaminitis - Resolved: Likely due to underlying COVID-19 infection.     Anxiety: PRN Vistaril.     Steroid-induced hyperglycemia with Pre diabetes: Mild hyperglycemia.  No recent hemoglobin A1c.  HgbA1c this admission 6.3 indicating pre diabetes.  Will need to follow up with PCP.     Significant Results and Procedures   Results for orders placed or performed during the hospital encounter of 12/14/21   XR Chest 2 Views    Narrative    EXAM: XR CHEST 2 VW  LOCATION: [facility]  DATE/TIME: 12/14/2021 8:37 PM    INDICATION: covid, hypoxia  COMPARISON: 12/14/2021 at 1351 hours and 07/02/2018 radiographs. 03/24/2020 CT.       Impression    IMPRESSION: No pleural fluid or pneumothorax. Heterogeneous pulmonary opacities could be seen with atypical infection such as COVID 19 pneumonia. These opacities are minimally more conspicuous than on the comparison study from earlier today. Normal size   of the heart.    CT Chest Pulmonary Embolism w Contrast     Narrative    CT CHEST PULMONARY EMBOLISM WITH CONTRAST December 15, 2021 12:18 PM    CLINICAL HISTORY: Pulmonary embolus suspected, low/intermediate  probability, positive D-dimer.    TECHNIQUE: CT angiogram chest during arterial phase injection IV  contrast. 2D and 3D MIP reconstructions were performed by the CT  technologist. Dose reduction techniques were used.  CONTRAST: 77mL Isovue-370.    COMPARISON: 3/24/2020.    FINDINGS:  ANGIOGRAM CHEST: Pulmonary arteries are normal caliber and negative  for pulmonary emboli. Thoracic aorta is negative for dissection.     LUNGS AND PLEURA: There are patchy bilateral ground-glass and  interstitial opacities present. No pleural effusion or pneumothorax.    MEDIASTINUM/AXILLAE: No pathologically enlarged mediastinal, hilar, or  axillary lymph nodes.    CORONARY ARTERY CALCIFICATION: Moderate.    UPPER ABDOMEN: Hepatic cysts do not require specific follow-up. No  worrisome liver lesion. There are gallstones in the gallbladder.    MUSCULOSKELETAL: Unremarkable.      Impression    IMPRESSION:  1.  No evidence of pulmonary embolism.  2.  Ground-glass and interstitial opacities with a typical appearance  of COVID-19 are present.    MELONIE BLANTON MD         SYSTEM ID:  [id number]         Pending Results   None  Code Status   Full Code       Primary Care Physician   Yasmin Rodríguez        Discharge Disposition   Discharged to home  Condition at discharge: Stable    Consultations This Hospital Stay   CARE MANAGEMENT / SOCIAL WORK IP CONSULT  RESPIRATORY CARE IP CONSULT  CARE MANAGEMENT / SOCIAL WORK IP CONSULT  PHARMACY DISCHARGE EDUCATION BY PHARMACIST    Time Spent on this Encounter   Vianney BELLA MD, MD, personally saw the patient today and spent greater than 30 minutes discharging this patient.    Discharge Orders      Care Coordination Referral      COVID-19 GetWell Loop Referral      Reason for your hospital stay    Covid pneumonia     Follow-up and recommended labs  and tests     Follow up with primary care provider, Yasmin Rodríguez, within 7 days     Activity    Your activity upon discharge: activity as tolerated     Reason for your hospital stay    Covid pneumonia     Contact provider    Contact your primary care provider if If increased trouble breathing, new arm/leg swelling, dizziness/passing out, falls, bleeding that doesn't stop, or uncontrolled pain.     Discharge - Quarantine/Isolation Instruction    Date of symptom onset:     Date of first positive test:    Stay home and away from others (self-isolate) for at least 20 days from when your symptoms started And...   You've had no fevers and no medicine that reduces fever for 1 full day (24 hours) And...   Your other symptoms have resolved (gotten better).     Discharge - COVID Patient Oximeter Discharge Instructions    COVID Patient Oximeter Discharge Instructions     Use the oximeter to measure the amount of oxygen in your blood.  Put the clip on the tip of your pointer finger.  Turn on the device to measure your oxygen level.  Do this at least 2 times a day. Do it more than 2 times if you feel short of breath.      It should be 90% or higher while you're at rest.  If your oxygen level is 89% or lower, change the position of the clip on your finger or try another finger.  After 10 minutes if it's still 89% or lower, call 911/go to the Emergency Department.       If your shortness of breath may be getting worse but the oximeter still shows 90% or higher, call your doctor or clinic as soon as possible. If you can't reach your doctor or clinic, or if your shortness of breath gets very bad, call 911/go to the Emergency Department.    Note: Don't turn down your oxygen until you get instructions at your virtual visit.     Follow-up and recommended labs and tests     Follow up with primary care provider, Yasmin Rodríguez, within 7 days     Oxygen Adult/Peds    Oxygen Documentation:   I certify that this patient, Jung Severino has been  under my care (or a nurse practitioner or physican's assistant working with me). This is the face-to-face encounter for oxygen medical necessity.      Jung Severino is now in a chronic stable state and continues to require supplemental oxygen. Patient has continued oxygen desaturation due to Covid pneumonia.    Alternative treatment(s) tried or considered and deemed clinically infective for treatment of Covid pneumonia include nebulizers, inhalers, steroids, and pulmonary toileting.  If portability is ordered, is the patient mobile within the home? yes    **Patients who qualify for home O2 coverage under the CMS guidelines require ABG tests or O2 sat readings obtained closest to, but no earlier than 2 days prior to the discharge, as evidence of the need for home oxygen therapy. Testing must be performed while patient is in the chronic stable state. See notes for O2 sats.**     Diet    Follow this diet upon discharge: Orders Placed This Encounter      Combination Diet Regular Diet Adult     Discharge Medications   Current Discharge Medication List      START taking these medications    Details   apixaban ANTICOAGULANT (ELIQUIS) 2.5 MG tablet Take 1 tablet (2.5 mg) by mouth 2 times daily  Qty: 60 tablet, Refills: 0    Associated Diagnoses: Pneumonia due to 2019 novel coronavirus         CONTINUE these medications which have NOT CHANGED    Details   losartan (COZAAR) 50 MG tablet TAKE 1 TABLET(50 MG) BY MOUTH DAILY  Qty: 30 tablet, Refills: 0    Associated Diagnoses: Essential hypertension      melatonin 5 MG tablet Take 5 mg by mouth At Bedtime      multivitamin, therapeutic with minerals (THERA-VIT-M) TABS tablet Take 1 tablet by mouth daily  Qty: 30 each, Refills: 0    Associated Diagnoses: Alcohol withdrawal, uncomplicated (H)      traZODone (DESYREL) 100 MG tablet Take 1 tablet (100 mg) by mouth At Bedtime  Qty: 90 tablet, Refills: 3    Associated Diagnoses: Primary insomnia      triamcinolone (KENALOG) 0.1 %  external cream Apply topically 2 times daily  Qty: 80 g, Refills: 3    Associated Diagnoses: Other eczema             Allergies   Allergies   Allergen Reactions     Lisinopril      Watery eyes.     Sulfa Drugs      Tetanus [Tetanus Toxoids] Nausea and Vomiting     High Fever x4 days     Data   Most Recent 3 CBC's:Recent Labs   Lab Test 12/27/21  0626 12/24/21  1324 12/17/21  0901 12/16/21  0642   WBC  --  8.5 9.4 6.0   HGB  --  14.3 14.4 13.8   MCV  --  94 96 96    266  266 238 189      Most Recent 3 BMP's:  Recent Labs   Lab Test 12/28/21  0808 12/27/21  0626 12/26/21  0716 12/25/21  0632 12/24/21  0826 12/20/21  0657 12/19/21  0646 12/17/21  0901 12/16/21  0642   NA  --   --   --   --  138  --  139  --  141   POTASSIUM 4.4 3.7 3.8   < > 3.8   < > 4.2   < > 4.2   CHLORIDE  --   --   --   --  106  --  106  --  114*   CO2  --   --   --   --  25  --  27  --  20   BUN  --   --   --   --  23  --  25  --  28   CR 0.91 0.93 0.91   < > 0.84   < > 0.93  --  0.88   ANIONGAP  --   --   --   --  7  --  6  --  7   GLADYS  --   --   --   --  8.9  --  9.1  --  8.3*   GLC  --   --   --   --  128*  --  118*  --  150*    < > = values in this interval not displayed.     Most Recent 2 LFT's:  Recent Labs   Lab Test 12/24/21  0826 12/19/21  0646   AST 28 39   ALT 60 83*   ALKPHOS 73 54   BILITOTAL 0.5 0.7     Most Recent INR's and Anticoagulation Dosing History:  Anticoagulation Dose History     Recent Dosing and Labs Latest Ref Rng & Units 7/31/2008 12/22/2008 3/24/2020    INR 0.86 - 1.14 0.97 0.88 1.00        Most Recent 3 Troponin's:  Recent Labs   Lab Test 12/11/16  1823   TROPI <0.015  The 99th percentile for upper reference range is 0.045 ug/L.  Troponin values in   the range of 0.045 - 0.120 ug/L may be associated with risks of adverse   clinical events.       Most Recent Cholesterol Panel:  Recent Labs   Lab Test 03/05/18  1353   CHOL 223*   *   HDL 63   TRIG 194*     Most Recent 6 Bacteria Isolates From Any Culture  (See EPIC Reports for Culture Details):  Recent Labs   Lab Test 12/17/16  0008 12/16/16  2355 12/16/16  2150 12/16/16  2145 12/15/16  0530   CULT >100,000 colonies/mL Coagulase negative Staphylococcus  50,000 to 100,000 colonies/mL Strain 2 Coagulase negative Staphylococcus  10,000 to 50,000 colonies/mL Strain 3 Coagulase negative Staphylococcus  * Heavy growth Normal edep  Moderate growth Candida albicans / dubliniensis Candida albicans and Candida   dubliniensis are not routinely speciated Susceptibility testing not routinely   done  * No growth No growth Moderate growth Normal deep     Most Recent TSH, T4 and A1c Labs:  Recent Labs   Lab Test 12/24/21  1324 03/05/18  1353   TSH  --  1.83   A1C 6.3*  --

## 2021-12-28 NOTE — PROGRESS NOTES
Patient had COVID symptom onset on 12/1/21.  Per the Special precautions duration policy, below, the patient meets criteria for discontinuation of special precautions:    .SEVERE-TO-CRITICAL ILLNESS OR MILDLY IMMUNOCOMPROMISED:    Following criteria for mildly immunocompromised patients OR those with severe-to-critical COVID-19 illness,  patient meets criteria for discontinuation of Special Precautions:    FOR SYMPTOMATIC - 20 days following symptom onset, >24 hr fever free without fever-reducing meds, AND substantial improvement in COVID-19 symptoms.    FOR ASYMPTOMATIC - 20 days from positive test AND no COVID-19 symptom development in 20-day timeframe.    Special Precautions discontinued December 27, 2021. Patient is considered recovered for 90 days from symptom onset(12/1/21).     .12/28/2021.Kadie Kiran, Infection Prevention

## 2021-12-28 NOTE — PLAN OF CARE
Patient has been assessed for Home Oxygen needs. Oxygen readings:    *Pulse oximetry (SpO2) = 93% on 2LO2 at rest while awake.    *SpO2 = 82% on 2LO2 during activity/with exercise.    *SpO2 improved to 94% on 4 liters/minute during activity/with exercise.

## 2021-12-28 NOTE — PHARMACY - DISCHARGE MEDICATION RECONCILIATION AND EDUCATION
Jung   Amandadarryl     1965      male    [id number]                                [id number]    Allergy: Lisinopril, Sulfa drugs, and Tetanus [tetanus toxoids]    RX: Discharge Medication Consult by Pharmacist  EDUCATION:   Discharge Medication List     Medication List      Started    apixaban ANTICOAGULANT 2.5 MG tablet  Commonly known as: ELIQUIS  2.5 mg, Oral, 2 TIMES DAILY            Patient was informed to STOP taking the following HOME medications:   none    Patient was informed to start taking the following NEW medications:   apixaban    Patient was educated on the following for each discharge medication:  Rationale for therapy  Duration of treatment  Dosing and or monitoring drug levels  Common side effects  Importance of compliance  Drug/food interactions  Missed doses  Self monitoring parameters    OUTCOMES:  Patient verbalized understanding  Left written materials and instructions: yes.    IMPORTANT FOLLOW UP NOTES:     Follow up with primary care provider, Yasmin Rodríguez, within 7 days

## 2021-12-29 ENCOUNTER — PATIENT OUTREACH (OUTPATIENT)
Dept: CARE COORDINATION | Facility: CLINIC | Age: 56
End: 2021-12-29
Payer: COMMERCIAL

## 2021-12-29 DIAGNOSIS — Z71.89 OTHER SPECIFIED COUNSELING: ICD-10-CM

## 2021-12-29 NOTE — PROGRESS NOTES
Clinic Care Coordination Contact  [facility] [[email or web address]]/Voicemail       Clinical Data: Care Coordinator Outreach  Outreach attempted x 1.  Left message on patient's voicemail with call back information and requested return call.  Plan:  Care Coordinator will try to reach patient again in 1-2 business days.      VIRGILIO Gonsalves  [phone]  [facility]

## 2021-12-30 ENCOUNTER — PATIENT OUTREACH (OUTPATIENT)
Dept: CARE COORDINATION | Facility: CLINIC | Age: 56
End: 2021-12-30
Payer: COMMERCIAL

## 2021-12-30 NOTE — PROGRESS NOTES
Clinic Care Coordination Contact    Background: Care Coordination transition handoff received from inpatient care team and referral was also placed from [facility] discharge report for reason of patient meeting criteria for a TCM outreach call by [facility] team.    Assessment: Upon chart review, RNCC Team member will cancel/close the initial Care Coordination IP Handoff referral for TCM outreach due to:    Duplicate order  [facility] team has already completed/attempted post hospital discharge TCM patient outreach.     Plan: Care Coordination IP handoff referral cancelled.    Britney Vazquez RN Care Coordinator  [facility]  Email: [email or web address]  Phone: [phone]

## 2021-12-30 NOTE — PROGRESS NOTES
Clinic Care Coordination Contact  [facility]: Post-Discharge Note  SITUATION                                                      Admission:    Admission Date: 12/14/21   Reason for Admission: Covid pneumonia  Discharge:   Discharge Date: 12/28/21  Discharge Diagnosis: Covid pneumonia    BACKGROUND                                                      Jung Severino is a 56 year old UNVACCINATED male with a PMH significant for HTN, obesity who developed fever and chills on 12/1 and confirmed COVID positive on 12/3. He continues to have intermittent fever (up to 103) and fatigue and was seen in the ED on 12/7; discharged as he was HD stable and not hypoxic. He returned yesterday to ED due to concerns for 20 lbs weight loss and episodes of hypoxia (upper 80's) since this weekend.  However he left ED after having only labs and CXR performed given the wait time. PCP called him today and informed him of ARF and asked him to return to the ED.     ASSESSMENT      Enrollment  Primary Care Care Coordination Status: Declined    Discharge Assessment  How are you doing now that you are home?: I am doing a lot better. I am eating and sleeping good  How are your symptoms? (Red Flag symptoms escalate to triage hotline per guidelines): Improved  Do you feel your condition is stable enough to be safe at home until your provider visit?: Yes  Does the patient have their discharge instructions? : Yes  Does the patient have questions regarding their discharge instructions? : No  Were you started on any new medications or were there changes to any of your previous medications? : Yes  Does the patient have all of their medications?: Yes  Do you have questions regarding any of your medications? : No  Do you have all of your needed medical supplies or equipment (DME)?  (i.e. oxygen tank, CPAP, cane, etc.): Yes  Discharge follow-up appointment scheduled within 14 calendar days? : No (Patient was going to call and schedule his  appointment today)  Is patient agreeable to assistance with scheduling? : No                  PLAN                                                      Outpatient Plan: Follow up with primary care provider, Yasmin Rodríguez, within 7 days    No future appointments.      For any urgent concerns, please contact our 24 hour nurse triage line: [phone] ([phone])         VIRGILIO Gonsalves  [phone]  [facility]

## 2022-01-06 ENCOUNTER — OFFICE VISIT (OUTPATIENT)
Dept: FAMILY MEDICINE | Facility: CLINIC | Age: 57
End: 2022-01-06
Payer: COMMERCIAL

## 2022-01-06 VITALS
HEART RATE: 110 BPM | OXYGEN SATURATION: 97 % | RESPIRATION RATE: 16 BRPM | WEIGHT: 197 LBS | DIASTOLIC BLOOD PRESSURE: 79 MMHG | BODY MASS INDEX: 29.95 KG/M2 | TEMPERATURE: 98.6 F | SYSTOLIC BLOOD PRESSURE: 119 MMHG

## 2022-01-06 DIAGNOSIS — J12.82 PNEUMONIA DUE TO 2019 NOVEL CORONAVIRUS: Primary | ICD-10-CM

## 2022-01-06 DIAGNOSIS — Z23 NEED FOR PROPHYLACTIC VACCINATION AND INOCULATION AGAINST INFLUENZA: ICD-10-CM

## 2022-01-06 DIAGNOSIS — D64.9 ANEMIA, UNSPECIFIED TYPE: ICD-10-CM

## 2022-01-06 DIAGNOSIS — U07.1 PNEUMONIA DUE TO 2019 NOVEL CORONAVIRUS: Primary | ICD-10-CM

## 2022-01-06 LAB
ALBUMIN SERPL-MCNC: 3 G/DL (ref 3.4–5)
ALP SERPL-CCNC: 78 U/L (ref 40–150)
ALT SERPL W P-5'-P-CCNC: 35 U/L (ref 0–70)
ANION GAP SERPL CALCULATED.3IONS-SCNC: 3 MMOL/L (ref 3–14)
AST SERPL W P-5'-P-CCNC: 25 U/L (ref 0–45)
BILIRUB SERPL-MCNC: 0.4 MG/DL (ref 0.2–1.3)
BUN SERPL-MCNC: 12 MG/DL (ref 7–30)
CALCIUM SERPL-MCNC: 9.3 MG/DL (ref 8.5–10.1)
CHLORIDE BLD-SCNC: 106 MMOL/L (ref 94–109)
CO2 SERPL-SCNC: 29 MMOL/L (ref 20–32)
CREAT SERPL-MCNC: 0.96 MG/DL (ref 0.66–1.25)
ERYTHROCYTE [DISTWIDTH] IN BLOOD BY AUTOMATED COUNT: 12.3 % (ref 10–15)
GFR SERPL CREATININE-BSD FRML MDRD: >90 ML/MIN/1.73M2
GLUCOSE BLD-MCNC: 108 MG/DL (ref 70–99)
HCT VFR BLD AUTO: 38.1 % (ref 40–53)
HGB BLD-MCNC: 12.8 G/DL (ref 13.3–17.7)
MCH RBC QN AUTO: 31 PG (ref 26.5–33)
MCHC RBC AUTO-ENTMCNC: 33.6 G/DL (ref 31.5–36.5)
MCV RBC AUTO: 92 FL (ref 78–100)
PLATELET # BLD AUTO: 246 10E3/UL (ref 150–450)
POTASSIUM BLD-SCNC: 4.7 MMOL/L (ref 3.4–5.3)
PROT SERPL-MCNC: 8.5 G/DL (ref 6.8–8.8)
RBC # BLD AUTO: 4.13 10E6/UL (ref 4.4–5.9)
SODIUM SERPL-SCNC: 138 MMOL/L (ref 133–144)
WBC # BLD AUTO: 5.5 10E3/UL (ref 4–11)

## 2022-01-06 PROCEDURE — 90471 IMMUNIZATION ADMIN: CPT | Performed by: PHYSICIAN ASSISTANT

## 2022-01-06 PROCEDURE — 80053 COMPREHEN METABOLIC PANEL: CPT | Performed by: PHYSICIAN ASSISTANT

## 2022-01-06 PROCEDURE — 90682 RIV4 VACC RECOMBINANT DNA IM: CPT | Performed by: PHYSICIAN ASSISTANT

## 2022-01-06 PROCEDURE — 85027 COMPLETE CBC AUTOMATED: CPT | Performed by: PHYSICIAN ASSISTANT

## 2022-01-06 PROCEDURE — 36415 COLL VENOUS BLD VENIPUNCTURE: CPT | Performed by: PHYSICIAN ASSISTANT

## 2022-01-06 PROCEDURE — 99214 OFFICE O/P EST MOD 30 MIN: CPT | Mod: 25 | Performed by: PHYSICIAN ASSISTANT

## 2022-01-06 RX ORDER — FLUTICASONE PROPIONATE 50 MCG
1 SPRAY, SUSPENSION (ML) NASAL DAILY
Qty: 16 G | Refills: 0 | Status: SHIPPED | OUTPATIENT
Start: 2022-01-06 | End: 2023-03-21

## 2022-01-06 RX ORDER — BENZONATATE 100 MG/1
100 CAPSULE ORAL 3 TIMES DAILY PRN
Qty: 42 CAPSULE | Refills: 0 | Status: SHIPPED | OUTPATIENT
Start: 2022-01-06 | End: 2022-01-20

## 2022-01-06 ASSESSMENT — PATIENT HEALTH QUESTIONNAIRE - PHQ9
SUM OF ALL RESPONSES TO PHQ QUESTIONS 1-9: 4
5. POOR APPETITE OR OVEREATING: SEVERAL DAYS

## 2022-01-06 ASSESSMENT — ANXIETY QUESTIONNAIRES
6. BECOMING EASILY ANNOYED OR IRRITABLE: NOT AT ALL
GAD7 TOTAL SCORE: 7
1. FEELING NERVOUS, ANXIOUS, OR ON EDGE: MORE THAN HALF THE DAYS
IF YOU CHECKED OFF ANY PROBLEMS ON THIS QUESTIONNAIRE, HOW DIFFICULT HAVE THESE PROBLEMS MADE IT FOR YOU TO DO YOUR WORK, TAKE CARE OF THINGS AT HOME, OR GET ALONG WITH OTHER PEOPLE: NOT DIFFICULT AT ALL
3. WORRYING TOO MUCH ABOUT DIFFERENT THINGS: NOT AT ALL
7. FEELING AFRAID AS IF SOMETHING AWFUL MIGHT HAPPEN: MORE THAN HALF THE DAYS
2. NOT BEING ABLE TO STOP OR CONTROL WORRYING: NOT AT ALL
5. BEING SO RESTLESS THAT IT IS HARD TO SIT STILL: MORE THAN HALF THE DAYS

## 2022-01-06 NOTE — PROGRESS NOTES
Assessment & Plan     Pneumonia due to 2019 novel coronavirus  History of this. Stable at home on 2L o2. This is decreased since discharge. Still desating with activity if not on o2. Educated this is not unexpected. Given improvement since discharge, will plan on decreasing o2 every 3 days by 0.5 L and if 02 sats drop below 90, to return to previous o2 level and wait additional 3 days until attempting to decrease again. Also recommending pulm rehab to aid in recovery. Work note given to avoid travel given current o2 dependence. May work from home if able. For residual cough, tessalon to aid and flonase to aid in drainage. Will recheck cmp given renal and hepatic impairment while in hospital. CBC obtained due to elliquis use. Follow up for recheck with me virtually in 2 weeks.   - Comprehensive metabolic panel (BMP + Alb, Alk Phos, ALT, AST, Total. Bili, TP); Future  - CBC with platelets; Future  - Pulmonary Rehab Referral; Future  - fluticasone (FLONASE) 50 MCG/ACT nasal spray; Spray 1 spray into both nostrils daily  - benzonatate (TESSALON) 100 MG capsule; Take 1 capsule (100 mg) by mouth 3 times daily as needed for cough  - Comprehensive metabolic panel (BMP + Alb, Alk Phos, ALT, AST, Total. Bili, TP)  - CBC with platelets  -Medication use and side effects discussed with the patient. Patient is in complete understanding and agreement with plan.     Need for prophylactic vaccination and inoculation against influenza    - INFLUENZA QUAD, RECOMBINANT, P-FREE (RIV4) (FLUBLOK)    6}           Return in about 2 weeks (around 1/20/2022) for Follow up recent COVID pneumonia.    Melchor Sands PA-C  [facility] CHACORTA Lees is a 56 year old who presents for the following health issues     HPI     Post Discharge Outreach 12/30/2021   Admission Date 12/14/2021   Reason for Admission Covid pneumonia   Discharge Date 12/28/2021   Discharge Diagnosis Covid pneumonia   How are you doing  now that you are home? I am doing a lot better. I am eating and sleeping good   How are your symptoms? (Red Flag symptoms escalate to triage hotline per guidelines) Improved   Do you feel your condition is stable enough to be safe at home until your provider visit? Yes   Does the patient have their discharge instructions?  Yes   Does the patient have questions regarding their discharge instructions?  No   Were you started on any new medications or were there changes to any of your previous medications?  Yes   Does the patient have all of their medications? Yes   Do you have questions regarding any of your medications?  No   Do you have all of your needed medical supplies or equipment (DME)?  (i.e. oxygen tank, CPAP, cane, etc.) Yes   Discharge follow-up appointment scheduled within 14 calendar days?  No     Hospital Follow-up Visit:    Hospital/Nursing Home/IP Rehab Facility: [facility]  Date of Admission: 12/14/2021  Date of Discharge: 12/28/2021  Reason(s) for Admission: Pneumonia due to covid 19      Was your hospitalization related to COVID-19? YES   How are you feeling today? Much better  In the past 24 hours have you had shortness of breath when speaking, walking, or climbing stairs? My breathing issues have improved-still having shortness of breath with activities such as walking to a different room in the home without o2. States he did this and o2 dropped to 80%, but improved into 90s within 1 minute with rest. 2L of o2 used, but states has noted his oxygen slips out of nose often and o2 is in the 90s still.   Do you have a cough? Yes, I have a cough but it's not worse  When is the last time you had a fever greater than 100? Not since, discharged from [facility]  Are you having any other symptoms? None   Do you have any other stressors you would like to discuss with your provider? No  PHQ Assesment Total Score(s) 1/6/2022   PHQ-9 Score 4   Some recent data might be hidden       Was the  patient in the ICU or did the patient experience delirium during hospitalization?  No          Problems taking medications regularly:  None  Medication changes since discharge: None  Problems adhering to non-medication therapy:  None    Summary of hospitalization:  [facility] discharge summary reviewed  Diagnostic Tests/Treatments reviewed.  Follow up needed: none  Other Healthcare Providers Involved in Patient s Care:         None  Update since discharge: improved.  Post Discharge Medication Reconciliation: discharge medications reconciled and changed, per note/orders.  Plan of care communicated with patient and family          Review of Systems   Constitutional, HEENT, cardiovascular, pulmonary, GI, , musculoskeletal, neuro, skin, endocrine and psych systems are negative, except as otherwise noted.      Objective    /79 (BP Location: Right arm, Patient Position: Chair, Cuff Size: Adult Large)   Pulse 110   Temp 98.6  F (37  C) (Oral)   Resp 16   Wt 89.4 kg (197 lb)   SpO2 97%   BMI 29.95 kg/m    Body mass index is 29.95 kg/m .  Physical Exam   GENERAL: alert and no distress  RESP: lungs clear to auscultation - no rales, rhonchi or wheezes  CV: regular rates and rhythm, normal S1 S2, no S3 or S4 and no murmur, click or rub  PSYCH: mentation appears normal, affect normal/bright

## 2022-01-06 NOTE — LETTER
[facility]  [address] [address] [address]  Phone: [phone]    January 6, 2022        Jung Severino  [address] [address]          To whom it may concern:    RE: Jung Severino    Patient was seen and treated today at our clinic and missed work throughout recent illness for covid-19. He first started symptoms on 12/1/21, but then worsened to the point of hospitalization on 12/14/21 through 12/28/21. Currently improving, but still significantly symptomatic. Work during present time and in immediate future will likely be limited on no extensive travel. Unclear when this will be expected to be resolved. However, I feel will not be able to travel until ~2/6/22.     Please contact me for questions or concerns.      Sincerely,        Melchor Sands PA-C

## 2022-01-06 NOTE — PATIENT INSTRUCTIONS
Every 3 days, decrease O2 by 0.5 liters. Watch you Oxygen level and if drops below 90% or you become shortness of breath, increase the oxygen by 0.5 liters.    Two weeks come back in for re-evaluation.

## 2022-01-07 ASSESSMENT — ANXIETY QUESTIONNAIRES: GAD7 TOTAL SCORE: 7

## 2022-01-11 ENCOUNTER — HOSPITAL ENCOUNTER (OUTPATIENT)
Dept: CARDIAC REHAB | Facility: CLINIC | Age: 57
End: 2022-01-11
Attending: PHYSICIAN ASSISTANT
Payer: COMMERCIAL

## 2022-01-11 DIAGNOSIS — U07.1 PNEUMONIA DUE TO 2019 NOVEL CORONAVIRUS: ICD-10-CM

## 2022-01-11 DIAGNOSIS — J12.82 PNEUMONIA DUE TO 2019 NOVEL CORONAVIRUS: ICD-10-CM

## 2022-01-11 PROCEDURE — 94626 PHY/QHP OP PULM RHB W/MNTR: CPT

## 2022-01-19 ENCOUNTER — HOSPITAL ENCOUNTER (OUTPATIENT)
Dept: CARDIAC REHAB | Facility: CLINIC | Age: 57
End: 2022-01-19
Attending: PHYSICIAN ASSISTANT
Payer: COMMERCIAL

## 2022-01-19 PROCEDURE — 94625 PHY/QHP OP PULM RHB W/O MNTR: CPT

## 2022-01-20 ENCOUNTER — LAB (OUTPATIENT)
Dept: LAB | Facility: CLINIC | Age: 57
End: 2022-01-20
Payer: COMMERCIAL

## 2022-01-20 ENCOUNTER — VIRTUAL VISIT (OUTPATIENT)
Dept: FAMILY MEDICINE | Facility: CLINIC | Age: 57
End: 2022-01-20
Payer: COMMERCIAL

## 2022-01-20 DIAGNOSIS — L30.8 OTHER ECZEMA: ICD-10-CM

## 2022-01-20 DIAGNOSIS — U07.1 PNEUMONIA DUE TO 2019 NOVEL CORONAVIRUS: Primary | ICD-10-CM

## 2022-01-20 DIAGNOSIS — I10 ESSENTIAL HYPERTENSION: ICD-10-CM

## 2022-01-20 DIAGNOSIS — J12.82 PNEUMONIA DUE TO 2019 NOVEL CORONAVIRUS: Primary | ICD-10-CM

## 2022-01-20 DIAGNOSIS — D64.9 ANEMIA, UNSPECIFIED TYPE: ICD-10-CM

## 2022-01-20 DIAGNOSIS — F51.01 PRIMARY INSOMNIA: ICD-10-CM

## 2022-01-20 LAB — HGB BLD-MCNC: 13.8 G/DL (ref 13.3–17.7)

## 2022-01-20 PROCEDURE — 36415 COLL VENOUS BLD VENIPUNCTURE: CPT

## 2022-01-20 PROCEDURE — 99213 OFFICE O/P EST LOW 20 MIN: CPT | Mod: 95 | Performed by: PHYSICIAN ASSISTANT

## 2022-01-20 PROCEDURE — 85018 HEMOGLOBIN: CPT

## 2022-01-20 RX ORDER — BENZONATATE 100 MG/1
100 CAPSULE ORAL 3 TIMES DAILY PRN
Qty: 42 CAPSULE | Refills: 3 | Status: SHIPPED | OUTPATIENT
Start: 2022-01-20 | End: 2023-03-21

## 2022-01-20 RX ORDER — TRIAMCINOLONE ACETONIDE 1 MG/G
CREAM TOPICAL 2 TIMES DAILY
Qty: 80 G | Refills: 3 | Status: SHIPPED | OUTPATIENT
Start: 2022-01-20 | End: 2023-07-05

## 2022-01-20 RX ORDER — TRAZODONE HYDROCHLORIDE 100 MG/1
100 TABLET ORAL AT BEDTIME
Qty: 90 TABLET | Refills: 3 | Status: SHIPPED | OUTPATIENT
Start: 2022-01-20 | End: 2023-03-21

## 2022-01-20 RX ORDER — LOSARTAN POTASSIUM 50 MG/1
50 TABLET ORAL DAILY
Qty: 90 TABLET | Refills: 3 | Status: SHIPPED | OUTPATIENT
Start: 2022-01-20 | End: 2023-01-24

## 2022-01-20 ASSESSMENT — ANXIETY QUESTIONNAIRES
3. WORRYING TOO MUCH ABOUT DIFFERENT THINGS: NOT AT ALL
2. NOT BEING ABLE TO STOP OR CONTROL WORRYING: NOT AT ALL
7. FEELING AFRAID AS IF SOMETHING AWFUL MIGHT HAPPEN: NOT AT ALL
6. BECOMING EASILY ANNOYED OR IRRITABLE: NOT AT ALL
IF YOU CHECKED OFF ANY PROBLEMS ON THIS QUESTIONNAIRE, HOW DIFFICULT HAVE THESE PROBLEMS MADE IT FOR YOU TO DO YOUR WORK, TAKE CARE OF THINGS AT HOME, OR GET ALONG WITH OTHER PEOPLE: NOT DIFFICULT AT ALL
GAD7 TOTAL SCORE: 0
1. FEELING NERVOUS, ANXIOUS, OR ON EDGE: NOT AT ALL
3. WORRYING TOO MUCH ABOUT DIFFERENT THINGS: NOT AT ALL
7. FEELING AFRAID AS IF SOMETHING AWFUL MIGHT HAPPEN: NOT AT ALL
GAD7 TOTAL SCORE: 0
5. BEING SO RESTLESS THAT IT IS HARD TO SIT STILL: NOT AT ALL
4. TROUBLE RELAXING: NOT AT ALL
7. FEELING AFRAID AS IF SOMETHING AWFUL MIGHT HAPPEN: NOT AT ALL
2. NOT BEING ABLE TO STOP OR CONTROL WORRYING: NOT AT ALL
GAD7 TOTAL SCORE: 0
6. BECOMING EASILY ANNOYED OR IRRITABLE: NOT AT ALL
5. BEING SO RESTLESS THAT IT IS HARD TO SIT STILL: NOT AT ALL
1. FEELING NERVOUS, ANXIOUS, OR ON EDGE: NOT AT ALL
GAD7 TOTAL SCORE: 0

## 2022-01-20 ASSESSMENT — PATIENT HEALTH QUESTIONNAIRE - PHQ9
5. POOR APPETITE OR OVEREATING: NOT AT ALL
SUM OF ALL RESPONSES TO PHQ QUESTIONS 1-9: 0

## 2022-01-20 NOTE — PROGRESS NOTES
Nabeel is a 56 year old who is being evaluated via a billable video visit.      How would you like to obtain your AVS? MyChart  If the video visit is dropped, the invitation should be resent by: Text to cell phone: [phone]  Will anyone else be joining your video visit? No    Video Start Time: 11:36    Assessment & Plan     Pneumonia due to 2019 novel coronavirus  Much improved. Has had 2 therapy sessions and drastic improvement with each. No longer needing home o2 with levels at rest remaining in range of 95%. Did try and shovel driveway last week and needing supplemental oxygen for short period of time after. Will continue on current therapy regimen, tessalon, and follow up in 4 weeks for recheck. Of note, patient states is getting a small o2 unit to take when traveling. Traveling for work in 2 weeks. He is wondering if this is safe. Will be safe to travel as long as has o2 if needed. Hopefully after next check, o2 will completely be stopped.   - benzonatate (TESSALON) 100 MG capsule; Take 1 capsule (100 mg) by mouth 3 times daily as needed for cough  -Medication use and side effects discussed with the patient. Patient is in complete understanding and agreement with plan.       Essential hypertension  Refill needed. Labs utd  - losartan (COZAAR) 50 MG tablet; Take 1 tablet (50 mg) by mouth daily  -Medication use and side effects discussed with the patient. Patient is in complete understanding and agreement with plan.       Other eczema  As above   - triamcinolone (KENALOG) 0.1 % external cream; Apply topically 2 times daily  -Medication use and side effects discussed with the patient. Patient is in complete understanding and agreement with plan.     Primary insomnia  As above   - traZODone (DESYREL) 100 MG tablet; Take 1 tablet (100 mg) by mouth At Bedtime  -Medication use and side effects discussed with the patient. Patient is in complete understanding and agreement with plan.          Return in about 1 month  (around 2/20/2022) for covid virtual follow up.    Melchor Sands PA-C  [facility] SANDY Lees is a 56 year old who presents for the following health issues     HPI        COVID-19 Follow uP  About how many days ago did these symptoms start? See hospitalizations notes.   Is this your first visit for this illness? No   How would you describe your symptoms since your last visit? My symptoms have improved  In the 14 days before your symptoms started, have you had close contact with someone with COVID-19 (Coronavirus)? No  Do you have a fever or chills? No  Are you having new or worsening difficulty breathing? No  Do you have new or worsening cough? Yes, it's a dry cough.   Have you had any new or unexplained body aches? No    Have you experienced any of the following NEW symptoms?    Headache: No    Sore throat: No    Loss of taste or smell: No Came back     Chest pain: No    Diarrhea: No    Rash: No  What treatments have you tried? Benzonatate   Who do you live with? Family   Are you, or a household member, a healthcare worker or a ? No  Do you live in a nursing home, group home, or shelter? No  Do you have a way to get food/medications if quarantined? Yes, I have a friend or family member who can help me.        Review of Systems   Constitutional, HEENT, cardiovascular, pulmonary, GI, , musculoskeletal, neuro, skin, endocrine and psych systems are negative, except as otherwise noted.      Objective    Vitals - Patient Reported  SpO2 (Patient Reported): 95      Vitals:  No vitals were obtained today due to virtual visit.    Physical Exam   GENERAL: Healthy, alert and no distress  EYES: Eyes grossly normal to inspection.  No discharge or erythema, or obvious scleral/conjunctival abnormalities.  RESP: No audible wheeze, cough, or visible cyanosis.  No visible retractions or increased work of breathing.    SKIN: Visible skin clear. No significant rash, abnormal  pigmentation or lesions.  NEURO: Cranial nerves grossly intact.  Mentation and speech appropriate for age.  PSYCH: Mentation appears normal, affect normal/bright, judgement and insight intact, normal speech and appearance well-groomed.            Video-Visit Details    Type of service:  Video Visit    Video End Time:11:53 AM    Originating Location (pt. Location): Home    Distant Location (provider location):  [facility] [place]     Platform used for Video Visit: aitainment  Answers for HPI/ROS submitted by the patient on 1/20/2022  ASHISH 7 TOTAL SCORE: 0

## 2022-01-21 ENCOUNTER — TELEPHONE (OUTPATIENT)
Dept: FAMILY MEDICINE | Facility: CLINIC | Age: 57
End: 2022-01-21
Payer: COMMERCIAL

## 2022-01-21 DIAGNOSIS — U07.1 PNEUMONIA DUE TO 2019 NOVEL CORONAVIRUS: Primary | ICD-10-CM

## 2022-01-21 DIAGNOSIS — J12.82 PNEUMONIA DUE TO 2019 NOVEL CORONAVIRUS: Primary | ICD-10-CM

## 2022-01-21 ASSESSMENT — ANXIETY QUESTIONNAIRES: GAD7 TOTAL SCORE: 0

## 2022-01-21 NOTE — TELEPHONE ENCOUNTER
Signed. Working remote currently and will not be printed off. Please print at clinic if needed.     -sarath kumar, pac

## 2022-01-21 NOTE — TELEPHONE ENCOUNTER
Woody,    Received call from pt   He needs assist with getting a small portable home O2 for travel    Order pended- please review and sign if appropriate    Thank you  Jan Caldera RN on 1/21/2022 at 3:09 PM    This encounter is being handled by a team outside your facility.  If action needs to be taken, please route the encounter back to your team at your own clinic, not the sender.  Thank you

## 2022-01-25 ENCOUNTER — HOSPITAL ENCOUNTER (OUTPATIENT)
Dept: CARDIAC REHAB | Facility: CLINIC | Age: 57
End: 2022-01-25
Attending: PHYSICIAN ASSISTANT
Payer: COMMERCIAL

## 2022-01-25 PROCEDURE — 94625 PHY/QHP OP PULM RHB W/O MNTR: CPT

## 2022-01-27 ENCOUNTER — HOSPITAL ENCOUNTER (OUTPATIENT)
Dept: CARDIAC REHAB | Facility: CLINIC | Age: 57
End: 2022-01-27
Attending: PHYSICIAN ASSISTANT
Payer: COMMERCIAL

## 2022-01-27 PROCEDURE — 94625 PHY/QHP OP PULM RHB W/O MNTR: CPT

## 2022-02-12 ENCOUNTER — HEALTH MAINTENANCE LETTER (OUTPATIENT)
Age: 57
End: 2022-02-12

## 2022-03-21 ENCOUNTER — NURSE TRIAGE (OUTPATIENT)
Dept: FAMILY MEDICINE | Facility: CLINIC | Age: 57
End: 2022-03-21
Payer: COMMERCIAL

## 2022-03-21 NOTE — TELEPHONE ENCOUNTER
Patient Quality Outreach    Patient is due for the following:   Colon Cancer Screening -  Colonoscopy  Physical  - Due after anytime    NEXT STEPS:   Schedule a yearly physical    Type of outreach:    Sent Kanbox message.      Questions for provider review:    None     Zoraida Blue, [facility]

## 2022-10-09 ENCOUNTER — HEALTH MAINTENANCE LETTER (OUTPATIENT)
Age: 57
End: 2022-10-09

## 2022-10-17 ENCOUNTER — HOSPITAL ENCOUNTER (EMERGENCY)
Facility: CLINIC | Age: 57
Discharge: HOME | End: 2022-10-17
Attending: EMERGENCY MEDICINE | Admitting: EMERGENCY MEDICINE
Payer: COMMERCIAL

## 2022-10-17 VITALS
TEMPERATURE: 96.9 F | OXYGEN SATURATION: 97 % | RESPIRATION RATE: 20 BRPM | HEART RATE: 98 BPM | DIASTOLIC BLOOD PRESSURE: 109 MMHG | SYSTOLIC BLOOD PRESSURE: 147 MMHG

## 2022-10-17 DIAGNOSIS — F10.930 ALCOHOL WITHDRAWAL, UNCOMPLICATED (H): Primary | ICD-10-CM

## 2022-10-17 DIAGNOSIS — E86.0 DEHYDRATION: ICD-10-CM

## 2022-10-17 DIAGNOSIS — I10 HYPERTENSION, UNSPECIFIED TYPE: ICD-10-CM

## 2022-10-17 LAB
ALBUMIN SERPL BCG-MCNC: 4.9 G/DL (ref 3.5–5.2)
ALP SERPL-CCNC: 88 U/L (ref 40–129)
ALT SERPL W P-5'-P-CCNC: 116 U/L (ref 10–50)
ANION GAP SERPL CALCULATED.3IONS-SCNC: 26 MMOL/L (ref 7–15)
AST SERPL W P-5'-P-CCNC: 205 U/L (ref 10–50)
BASOPHILS # BLD AUTO: 0 10E3/UL (ref 0–0.2)
BASOPHILS NFR BLD AUTO: 0 %
BILIRUB SERPL-MCNC: 1.9 MG/DL
BUN SERPL-MCNC: 34.5 MG/DL (ref 8–23)
BUN SERPL-MCNC: 35 MG/DL (ref 7–30)
CA-I BLD-MCNC: 4.7 MG/DL (ref 4.4–5.2)
CALCIUM SERPL-MCNC: 10.1 MG/DL (ref 8.6–10)
CHLORIDE BLD-SCNC: 96 MMOL/L (ref 94–109)
CHLORIDE SERPL-SCNC: 90 MMOL/L (ref 98–107)
CO2 BLD-SCNC: 23 MMOL/L (ref 20–32)
CREAT BLD-MCNC: 1.3 MG/DL (ref 0.7–1.3)
CREAT BLD-MCNC: 1.3 MG/DL (ref 0.7–1.3)
CREAT SERPL-MCNC: 1.44 MG/DL (ref 0.67–1.17)
DEPRECATED HCO3 PLAS-SCNC: 20 MMOL/L (ref 22–29)
EOSINOPHIL # BLD AUTO: 0 10E3/UL (ref 0–0.7)
EOSINOPHIL NFR BLD AUTO: 0 %
ERYTHROCYTE [DISTWIDTH] IN BLOOD BY AUTOMATED COUNT: 12.9 % (ref 10–15)
ETHANOL SERPL-MCNC: <0.01 G/DL
GFR SERPL CREATININE-BSD FRML MDRD: 56 ML/MIN/1.73M2
GFR SERPL CREATININE-BSD FRML MDRD: >60 ML/MIN/1.73M2
GLUCOSE BLD-MCNC: 132 MG/DL (ref 70–99)
GLUCOSE SERPL-MCNC: 144 MG/DL (ref 70–99)
HCO3 BLDV-SCNC: 24 MMOL/L (ref 21–28)
HCT VFR BLD AUTO: 49.8 % (ref 40–53)
HCT VFR BLD CALC: 45 % (ref 40–53)
HGB BLD-MCNC: 15.3 G/DL (ref 13.3–17.7)
HGB BLD-MCNC: 17 G/DL (ref 13.3–17.7)
IMM GRANULOCYTES # BLD: 0.1 10E3/UL
IMM GRANULOCYTES NFR BLD: 1 %
LACTATE BLD-SCNC: 1.3 MMOL/L
LYMPHOCYTES # BLD AUTO: 0.4 10E3/UL (ref 0.8–5.3)
LYMPHOCYTES NFR BLD AUTO: 3 %
MAGNESIUM SERPL-MCNC: 1.9 MG/DL (ref 1.7–2.3)
MCH RBC QN AUTO: 33.7 PG (ref 26.5–33)
MCHC RBC AUTO-ENTMCNC: 34.1 G/DL (ref 31.5–36.5)
MCV RBC AUTO: 99 FL (ref 78–100)
MONOCYTES # BLD AUTO: 0.9 10E3/UL (ref 0–1.3)
MONOCYTES NFR BLD AUTO: 8 %
NEUTROPHILS # BLD AUTO: 10.3 10E3/UL (ref 1.6–8.3)
NEUTROPHILS NFR BLD AUTO: 88 %
NRBC # BLD AUTO: 0 10E3/UL
NRBC BLD AUTO-RTO: 0 /100
PCO2 BLDV: 41 MM HG (ref 40–50)
PH BLDV: 7.38 [PH] (ref 7.32–7.43)
PLATELET # BLD AUTO: 122 10E3/UL (ref 150–450)
PO2 BLDV: 28 MM HG (ref 25–47)
POTASSIUM BLD-SCNC: 3.6 MMOL/L (ref 3.4–5.3)
POTASSIUM SERPL-SCNC: 3.7 MMOL/L (ref 3.4–5.3)
PROT SERPL-MCNC: 8.6 G/DL (ref 6.4–8.3)
RBC # BLD AUTO: 5.05 10E6/UL (ref 4.4–5.9)
SAO2 % BLDV: 51 % (ref 94–100)
SODIUM BLD-SCNC: 134 MMOL/L (ref 133–144)
SODIUM SERPL-SCNC: 136 MMOL/L (ref 136–145)
WBC # BLD AUTO: 11.7 10E3/UL (ref 4–11)

## 2022-10-17 PROCEDURE — 96361 HYDRATE IV INFUSION ADD-ON: CPT

## 2022-10-17 PROCEDURE — 85004 AUTOMATED DIFF WBC COUNT: CPT | Performed by: EMERGENCY MEDICINE

## 2022-10-17 PROCEDURE — 82374 ASSAY BLOOD CARBON DIOXIDE: CPT | Performed by: EMERGENCY MEDICINE

## 2022-10-17 PROCEDURE — 83735 ASSAY OF MAGNESIUM: CPT | Performed by: EMERGENCY MEDICINE

## 2022-10-17 PROCEDURE — 96374 THER/PROPH/DIAG INJ IV PUSH: CPT

## 2022-10-17 PROCEDURE — 96375 TX/PRO/DX INJ NEW DRUG ADDON: CPT

## 2022-10-17 PROCEDURE — 258N000003 HC RX IP 258 OP 636: Performed by: EMERGENCY MEDICINE

## 2022-10-17 PROCEDURE — 82803 BLOOD GASES ANY COMBINATION: CPT

## 2022-10-17 PROCEDURE — 82565 ASSAY OF CREATININE: CPT | Mod: 91

## 2022-10-17 PROCEDURE — 82077 ASSAY SPEC XCP UR&BREATH IA: CPT | Performed by: EMERGENCY MEDICINE

## 2022-10-17 PROCEDURE — 99285 EMERGENCY DEPT VISIT HI MDM: CPT | Mod: 25

## 2022-10-17 PROCEDURE — 80047 BASIC METABLC PNL IONIZED CA: CPT

## 2022-10-17 PROCEDURE — 250N000011 HC RX IP 250 OP 636: Performed by: EMERGENCY MEDICINE

## 2022-10-17 PROCEDURE — 250N000013 HC RX MED GY IP 250 OP 250 PS 637: Performed by: EMERGENCY MEDICINE

## 2022-10-17 PROCEDURE — 36415 COLL VENOUS BLD VENIPUNCTURE: CPT | Performed by: EMERGENCY MEDICINE

## 2022-10-17 RX ORDER — ONDANSETRON 2 MG/ML
4 INJECTION INTRAMUSCULAR; INTRAVENOUS ONCE
Status: COMPLETED | OUTPATIENT
Start: 2022-10-17 | End: 2022-10-17

## 2022-10-17 RX ORDER — FOLIC ACID 1 MG/1
1 TABLET ORAL ONCE
Status: COMPLETED | OUTPATIENT
Start: 2022-10-17 | End: 2022-10-17

## 2022-10-17 RX ORDER — LORAZEPAM 2 MG/ML
1 INJECTION INTRAMUSCULAR ONCE
Status: COMPLETED | OUTPATIENT
Start: 2022-10-17 | End: 2022-10-17

## 2022-10-17 RX ORDER — ONDANSETRON 4 MG/1
4 TABLET, FILM COATED ORAL EVERY 8 HOURS PRN
Qty: 10 TABLET | Refills: 0 | Status: SHIPPED | OUTPATIENT
Start: 2022-10-17 | End: 2023-03-21

## 2022-10-17 RX ADMIN — ONDANSETRON 4 MG: 2 INJECTION INTRAMUSCULAR; INTRAVENOUS at 11:00

## 2022-10-17 RX ADMIN — SODIUM CHLORIDE, POTASSIUM CHLORIDE, SODIUM LACTATE AND CALCIUM CHLORIDE 1000 ML: 600; 310; 30; 20 INJECTION, SOLUTION INTRAVENOUS at 10:50

## 2022-10-17 RX ADMIN — THIAMINE HCL TAB 100 MG 100 MG: 100 TAB at 11:03

## 2022-10-17 RX ADMIN — LORAZEPAM 1 MG: 2 INJECTION INTRAMUSCULAR; INTRAVENOUS at 10:44

## 2022-10-17 RX ADMIN — FOLIC ACID 1 MG: 1 TABLET ORAL at 11:03

## 2022-10-17 RX ADMIN — SODIUM CHLORIDE, POTASSIUM CHLORIDE, SODIUM LACTATE AND CALCIUM CHLORIDE 1000 ML: 600; 310; 30; 20 INJECTION, SOLUTION INTRAVENOUS at 12:04

## 2022-10-17 ASSESSMENT — ENCOUNTER SYMPTOMS
FEVER: 0
HEADACHES: 0
EYE PAIN: 0
VOMITING: 1
DIAPHORESIS: 1
BLOOD IN STOOL: 0
COUGH: 1
NERVOUS/ANXIOUS: 1
NECK PAIN: 0
COLOR CHANGE: 0
SORE THROAT: 1
HEMATURIA: 0
CHILLS: 0
DYSURIA: 0
DIZZINESS: 0
HALLUCINATIONS: 0
NAUSEA: 1
SHORTNESS OF BREATH: 0
RHINORRHEA: 1
BACK PAIN: 0
ABDOMINAL PAIN: 0

## 2022-10-17 ASSESSMENT — ACTIVITIES OF DAILY LIVING (ADL)
ADLS_ACUITY_SCORE: 18.25
ADLS_ACUITY_SCORE: 18.25

## 2022-10-17 NOTE — ED TRIAGE NOTES
Patient presents to the ED reporting alcohol withdrawal.  is scheduled to go to rehab in 1.5 weeks but began detoxing at home.  last drink was Thursday. Reports vomiting, anxiety and sweating.  is unable to keep anything down.

## 2022-10-17 NOTE — PHARMACY-ADMISSION MEDICATION HISTORY
Admission medication history interview status for this patient is complete. See [facility] admission navigator for allergy information, prior to admission medications and immunization status.     Medication history interview done, indicate source(s): Patient  Medication history resources (including written lists, pill bottles, clinic record):None  Pharmacy: [facility]    Changes made to PTA medication list:  Added: vitamin B1  Changed: melatonin at bedtime --> at bedtime prn  Reported as Not Taking: Eliquis, benzonatate   Removed: none    Actions taken by pharmacist (provider contacted, etc):None     Additional medication history information: Pt states he ran out of Flonase about 8 months ago - had been using daily.    Medication reconciliation/reorder completed by provider prior to medication history?  N   (Y/N)       Prior to Admission medications    Medication Sig Last Dose Taking? Auth Provider Long Term End Date   fluticasone (FLONASE) 50 MCG/ACT nasal spray Spray 1 spray into both nostrils daily More than a month at Pt ran out of rx Yes Melchor Sands PA-C     losartan (COZAAR) 50 MG tablet Take 1 tablet (50 mg) by mouth daily 10/13/2022 at am Yes Melchor Sands PA-C Yes    melatonin 5 MG tablet Take 5 mg by mouth nightly as needed for sleep prn at prn Yes Unknown, Entered By History     multivitamin, therapeutic with minerals (THERA-VIT-M) TABS tablet Take 1 tablet by mouth daily 10/13/2022 at am Yes Vianney Mas MD     Thiamine HCl (VITAMIN B1 PO) Take 1 tablet by mouth daily 10/13/2022 at am Yes Unknown, Entered By History     traZODone (DESYREL) 100 MG tablet Take 1 tablet (100 mg) by mouth At Bedtime 10/13/2022 at pm Yes Melchor Sands PA-C Yes    apixaban ANTICOAGULANT (ELIQUIS) 2.5 MG tablet Take 1 tablet (2.5 mg) by mouth 2 times daily  Patient not taking: Reported on 10/17/2022 Not Taking  Vianney Mas MD     benzonatate (TESSALON) 100 MG capsule  Take 1 capsule (100 mg) by mouth 3 times daily as needed for cough  Patient not taking: Reported on 10/17/2022 Not Taking  Melchor Sands PA-C     triamcinolone (KENALOG) 0.1 % external cream Apply topically 2 times daily 10/15/2022 at x2  Melchor Sands PA-C

## 2022-10-17 NOTE — ED PROVIDER NOTES
History   Chief Complaint:  Withdrawal       HPI   Jung Severino is a 57 year old male with history of alcohol abuse who presents with nausea, vomiting, diaphoresis, and anxiety since he stopped drinking last Thursday 10/13/22. He has been unable keep water down and hasn't eaten for the last 5 days. Patient has sore throat due to his vomiting. He denies suicidal ideation or hallucinations due to withdrawals. Patient dnies history of admission due to alcohol intoxication. Patient states he was drinking 3/4 of a 750 bottle of alcohol for the past few months prior to stopping. He is scheduled for detox in a week and half.    Review of Systems   Constitutional: Positive for diaphoresis. Negative for chills and fever.   HENT: Positive for rhinorrhea and sore throat.    Eyes: Negative for pain and visual disturbance.   Respiratory: Positive for cough. Negative for shortness of breath.    Cardiovascular: Negative for chest pain.   Gastrointestinal: Positive for nausea and vomiting. Negative for abdominal pain and blood in stool.   Genitourinary: Negative for dysuria and hematuria.   Musculoskeletal: Negative for back pain and neck pain.   Skin: Negative for color change and pallor.   Neurological: Negative for dizziness and headaches.   Psychiatric/Behavioral: Negative for hallucinations and suicidal ideas. The patient is nervous/anxious.    All other systems reviewed and are negative.    Allergies:  Lisinopril  Sulfa Drugs  Tetanus [Tetanus Toxoids]    Medications:  eliquis   Cozaar  Desyrel    Past Medical History:     TAMMI  Hypertension   Anxiety  Alcohol abuse  Depression  Diverticulitis    Family History:    Father- heart disease    Social History:  The patient presents to the ED alone  Pharmacy: [id number] [facility]  PCP: Yasmin Rodríguez     Physical Exam     Patient Vitals for the past 24 hrs:   BP Temp Pulse Resp SpO2   10/17/22 1400 (!) 147/109 -- 98 -- 97 %   10/17/22 1315 (!) 175/117 -- 97 -- 100 %    10/17/22 1300 (!) 159/119 -- 95 -- 96 %   10/17/22 1230 (!) 176/133 -- 84 -- 98 %   10/17/22 1215 (!) 185/128 -- 99 -- 98 %   10/17/22 1200 (!) 150/126 -- 116 -- 96 %   10/17/22 1145 (!) 168/130 -- 109 -- 96 %   10/17/22 1130 (!) 167/132 -- 103 -- 94 %   10/17/22 1040 -- -- -- -- 98 %   10/17/22 1035 (!) 159/130 -- 113 -- 98 %   10/17/22 0913 (!) 168/133 96.9  F (36.1  C) (!) 132 20 97 %       Physical Exam  Constitutional:       Appearance: Normal appearance.     General: Not in acute distress.  HENT:      Head: Normocephalic and atraumatic.   Eyes:      Extraocular Movements: Extraocular movements intact.      Conjunctiva/sclera: Conjunctivae normal.   Cardiovascular:      Rate and Rhythm: Normal rate and regular rhythm.   Pulmonary:      Effort: Pulmonary effort is normal. No respiratory distress.   Abdominal:      General: Abdomen is flat. There is no distension.   Musculoskeletal:         General: No swelling or deformity.      Cervical back: Normal range of motion. No rigidity.   Skin:     Coloration: Skin is not jaundiced or pale.   Neurological:      General: No focal deficit present. No tremors.     Mental Status: Alert and oriented to person, place, and time.   Psychiatric:         Mood and Affect: Mood normal.         Behavior: Behavior normal.    Emergency Department Course   ECG  ECG results from 03/24/20   EKG 12 lead     Value    Interpretation ECG Click View Image link to view waveform and result       Laboratory:  Labs Ordered and Resulted from Time of ED Arrival to Time of ED Departure   COMPREHENSIVE METABOLIC PANEL - Abnormal       Result Value    Sodium 136      Potassium 3.7      Chloride 90 (*)     Carbon Dioxide (CO2) 20 (*)     Anion Gap 26 (*)     Urea Nitrogen 34.5 (*)     Creatinine 1.44 (*)     Calcium 10.1 (*)     Glucose 144 (*)     Alkaline Phosphatase 88       (*)      (*)     Protein Total 8.6 (*)     Albumin 4.9      Bilirubin Total 1.9 (*)     GFR Estimate 56 (*)     ETHYL ALCOHOL LEVEL - Abnormal    Alcohol ethyl <0.01 (*)    CBC WITH PLATELETS AND DIFFERENTIAL - Abnormal    WBC Count 11.7 (*)     RBC Count 5.05      Hemoglobin 17.0      Hematocrit 49.8      MCV 99      MCH 33.7 (*)     MCHC 34.1      RDW 12.9      Platelet Count 122 (*)     % Neutrophils 88      % Lymphocytes 3      % Monocytes 8      % Eosinophils 0      % Basophils 0      % Immature Granulocytes 1      NRBCs per 100 WBC 0      Absolute Neutrophils 10.3 (*)     Absolute Lymphocytes 0.4 (*)     Absolute Monocytes 0.9      Absolute Eosinophils 0.0      Absolute Basophils 0.0      Absolute Immature Granulocytes 0.1      Absolute NRBCs 0.0     ISTAT BASIC CHEM ICA HEMATOCRIT POCT - Abnormal    TOTAL CO2 POCT 23      Creatinine POCT 1.3      Hematocrit POCT 45      Calcium, Ionized Whole Blood POCT 4.7      UREA NITROGEN POCT 35 (*)     Glucose Whole Blood POCT 132 (*)     Potassium POCT 3.6      Sodium POCT 134      Hemoglobin POCT 15.3      Chloride POCT 96     ISTAT GASES LACTATE VENOUS POCT - Abnormal    Lactic Acid POCT 1.3      Bicarbonate Venous POCT 24      O2 Sat, Venous POCT 51 (*)     pCO2V Venous POCT 41      pH Venous POCT 7.38      pO2 Venous POCT 28     MAGNESIUM - Normal    Magnesium 1.9     ISTAT CREATININE POCT - Normal    Creatinine POCT 1.3      GFR, ESTIMATED POCT >60     ISTAT CREATININE POCT        Emergency Department Course:  Reviewed:  I reviewed nursing notes, vitals, past medical history and Care Everywhere    Assessment/consult:   ED Course as of 10/17/22 1619   Mon Oct 17, 2022   0946 I obtained history and examined the patient as noted above.    1302 Patient resting comfortably in bed.  Heart rate 80s to 90s.  Not tremulous.  Will discharge patient to follow-up with PCP.  Patient to follow-up with detox center as scheduled.  Will discharge patient with some nausea medication.  Patient has tolerated p.o. fluids well in the ER.  Discussed return precautions.  Answered all questions.   Patient feels comfortable with discharge home at this time.   1409 Updated patient on vbg/chem 8 findings. Improved creatinine. Advised patient to continue to drink fluids. Zofran precription sent. Patient states he will take his anti-hypertensions at home. Discharge instructions and return precautions given. Patient voiced understanding and agreement with plan.   1411 Bicarbonate Venous POCT: 24        Interventions:  1044 Ativan, 1 mg, IV   1050 Lactated ringer bolus, 1000 ml, IV  1100 Zofran, 4 mg, IV   1103 Thiamine, 100 mg, oral  1103 Folic acid, 1 mg, oral  1204 Lactated ringer bolus, 1000 ml, IV     Disposition:  The patient was discharged to home.     Impression & Plan   Medical Decision Makin-year-old male as described above presents to the emergency department for withdrawal symptoms.  Patient hemodynamically stable at time evaluation.  Afebrile.  Patient mildly tachycardic heart rate in the 110s on my examination.  Denies SI/HI/hallucination.  No resting tremors.  Patient likely in mild withdrawal and is pending outpatient alcohol detox.  Order basic lab work.  IV fluid rehydration.  1 dose of Ativan given mild symptoms.  Thiamine, folic acid, and nausea control.  CIWA scoring.  Will reevaluate.  Consider discharging patient outpatient if symptom well controlled.  Discussed care plan with patient who voiced understanding and agreement with plan.  Answered all questions.  Additional work-up and orders as listed in chart.    Please refer to ED course above for details on the patient's treatment course and any changes or updates in care plan beyond my initial evaluation and MDM.      Diagnosis:    ICD-10-CM    1. Alcohol withdrawal, uncomplicated (H)  F10.930       2. Hypertension, unspecified type  I10       3. Dehydration  E86.0           Discharge Medications:  Discharge Medication List as of 10/17/2022  2:19 PM      START taking these medications    Details   ondansetron (ZOFRAN) 4 MG tablet Take 1  tablet (4 mg) by mouth every 8 hours as needed for nausea, Disp-10 tablet, R-0, E-Prescribe             Scribe Disclosure:  I, Tray Alarcon, am serving as a scribe at 9:14 AM on 10/17/2022 to document services personally performed by Steven Fong DO based on my observations and the provider's statements to me.         Steven Fong DO  10/17/22 [id number]

## 2023-01-10 ENCOUNTER — PATIENT OUTREACH (OUTPATIENT)
Dept: CARE COORDINATION | Facility: CLINIC | Age: 58
End: 2023-01-10
Payer: COMMERCIAL

## 2023-01-10 ENCOUNTER — NURSE TRIAGE (OUTPATIENT)
Dept: FAMILY MEDICINE | Facility: CLINIC | Age: 58
End: 2023-01-10

## 2023-01-10 NOTE — LETTER
[facility] CARE COORDINATION  [address]  [facility] [address]    February 6, 2023    Jung Severino  [address] [address]      Dear Jung,        I am a clinic care coordinator who works with Yasmin Rodríguez MD with the [facility]. I wanted to thank you for spending the time to talk with me.  Below is a description of clinic care coordination and how I can further assist you.       The clinic care coordination team is made up of a registered nurse, , financial resource worker and community health worker who understand the health care system. The goal of clinic care coordination is to help you manage your health and improve access to the health care system. Our team works alongside your provider to assist you in determining your health and social needs. We can help you obtain health care and community resources, providing you with necessary information and education. We can work with you through any barriers and develop a care plan that helps coordinate and strengthen the communication between you and your care team.    Please feel free to contact me with any questions or concerns regarding care coordination and what we can offer.      We are focused on providing you with the highest-quality healthcare experience possible.    Sincerely,     Terrell Devi [facility]  Clinic Care Coordinator  [facility]  [facility]  [facility]  [phone]  [email or web address]    Enclosed: I have enclosed a copy of the Patient Centered Plan of Care. This has helpful information and goals that we have talked about. Please keep this in an easy to access place to use as needed.

## 2023-01-10 NOTE — TELEPHONE ENCOUNTER
Can Yvonne help with this.      Patient calling and wanting to get into detox for a couple of days.  Not eating.  Wants to get through detox and then get into treatment.  Wife trying to find treatment center.  Last drink about 4 am.  Drinks every day.  Drinks vodka.  Drinks a fifth a day.        Reason for Disposition    Drinks alcohol daily and prior DTs (delirium tremens)    Additional Information    Negative: Coma (e.g., not moving, not talking, not responding to stimuli)    Negative: Difficult to awaken or acting confused (e.g., disoriented, slurred speech)    Negative: Seeing, hearing, or feeling things that are not there (i.e., visual, auditory, or tactile hallucinations)    Negative: Slow, shallow and weak breathing    Negative: Seizure    Negative: Violent behavior, or threatening to physically hurt or kill someone    Negative: Patient attempted suicide    Negative: Threatening suicide    Negative: Sounds like a life-threatening emergency to the triager    Negative: Substance abuse or dependence: question or problem related to    Negative: Depression is main problem or symptom (e.g., feelings of sadness or hopelessness)    Negative: SEVERE abdominal pain (e.g., excruciating)    Negative: Constant abdominal pain lasting > 2 hours    Negative: Bloody, black, or tarry bowel movements (Exception: chronic-unchanged black-grey bowel movements and is taking iron pills or Pepto-bismol)    Negative: Vomiting red blood or black (coffee ground) material (Exception: few red streaks in vomit that only happened once)    Negative: Multiple episodes of vomiting and lasting more than 2 hours    Negative: Feeling very shaky (i.e., visible tremors of hands)    Negative: Patient sounds very sick or weak to the triager    Negative: White of the eyes have turned yellow (i.e., jaundice)    Negative: Fever > 101 F (38.3 C)    Negative: Drinks alcohol daily and prior alcohol withdrawal seizures    Answer Assessment - Initial  "Assessment Questions  1. DO YOU DRINK: \"Do you drink alcohol, including beer, wine or hard liquor?\"      Grey Goose Vodka  2. HOW OFTEN: \"How many days per week do you typically drink alcohol?\"      Daily a fifth  3. HOW MUCH: \"How many drinks do you typically have on days when you drink?\" (1.5 oz hard liquor [one shot or jigger; 45 ml], 5 oz wine [small glass; 150 ml], 12 oz beer [one can; 360 ml])      A fifth  4. MOST: \"What is the most that you have had to drink on any one occasion in the last month?\"      A fifth  5. LAST 24 HOURS: \"Have you had a drink within the last 24 hours?\"      Yes, last drink 4 am  6. DRINKING PROBLEM: \"Do you have or have you ever had a drinking problem?\"      Yes, tried to detox self and ended up at hospital 10/17/22  7. DRUG PROBLEM: \"Are you using any other drugs?\" (e.g., yes/no; cocaine, prescription medications, etc.)      Just alcohol  8. SYMPTOMS: \"What symptoms are you currently experiencing?\" (e.g., none, tremors or shakiness, abdominal pain, vomiting, blackout spells)      No symptoms currently  9. DETOX PROGRAM: \"Have you ever gone through a detox program?\"      Yes- 20 years ago  10. THERAPIST: \"Do you have a counselor or therapist? Name?\"        Not currently  11. SUPPORT: \"Who is with you now?\" \"Who do you live with?\" \"Do you have family or friends nearby who you can talk to?\" \"Are you a member of Alcoholics Anonymous?\"        Wife and best friend   12. PREGNANCY: \"Is there any chance you are pregnant?\" \"When was your last menstrual period?\"        n/a    Protocols used: ALCOHOL ABUSE AND [id number]-Y-MI      "

## 2023-01-10 NOTE — PROGRESS NOTES
Clinic Care Coordination Contact    Clinic Care Coordination Contact  OUTREACH    Referral Information:  Referral Source: Care Team         Chief Complaint   Patient presents with     Clinic Care Coordination - Initial     Resources for Detox        [place] Utilization:    Utilization    Hospital Admissions  0             ED Visits  1             No Show Count (past year)  7                Current as of: 1/3/2023  3:02 PM              Clinical Concerns:  Current Medical Concerns:    Patient Active Problem List   Diagnosis     Family history of other cardiovascular diseases     Closed fracture of head of radius     Contusion of face, scalp, and neck except eye(s)     Injury, other and unspecified, hand, except finger     Malaise and fatigue     Anxiety     Mild major depression (H)     CARDIOVASCULAR SCREENING; LDL GOAL LESS THAN 130     Alcohol withdrawal syndrome (H)     C. difficile colitis     Alcohol abuse     Benign essential hypertension     Diverticulitis of colon     TAMMI (acute kidney injury) (H)     Pneumonia due to 2019 novel coronavirus       [facility] outreached to pt on this date. Reviewed pt current care needs and discussed options for treatment. Pt indicates he is in need of detox and is wondering how to go about getting established with this support.     [facility] explained detox, voluntary status, and process to securing a bed. [facility] sent Detox resource/facilities via [employer]. [facility] stayed on the phone with pt to assure message was retrieved and viewable. (read receipt updated on [employer] encounter indicating pt read the message).     [facility] explained Care Coordination support and encouraged pt outreach to care coordination with any concerns/issues. Educated pt bed availability could take days if facility are at capacity. Pt expressed concern with this. [facility] encouraged pt to seek medical evaluation in the ED if he feels he is in danger or experiencing signs of withdrawal. Pt expressed understanding.     Pt  states he has C[employer] insurance, d/t coverage concerns in the past- CC outreached to [facility] to determine if they accept Cigna.     Pt agreed to begin outreaching to Detox facilities provided via [employer]. [facility] will follow up again in 3-5 business days to check on status.       Current Behavioral Concerns: CD Concerns    Education Provided to patient: Detox process. See MyChart.       Health Maintenance Reviewed:    Clinical Pathway: None    Medication Management:  Medication review status: Medications reviewed and no changes reported per patient.          Lifestyle & Psychosocial Needs:    Social Determinants of Health     Tobacco Use: Not on file   Alcohol Use: Not on file   Financial Resource Strain: Not on file   Food Insecurity: Not on file   Transportation Needs: Not on file   Physical Activity: Not on file   Stress: Not on file   Social Connections: Not on file   Intimate Partner Violence: Not on file   Depression: Not at risk     PHQ-2 Score: 0   Housing Stability: Not on file     Care Coordinator has reviewed patient's Social Determinants of Health (SDoH) on this date. Upon review, changes were not  made.        Care Plan:  Care Plan: Substance Use     Problem: Patient experiences a substance use disorder     Goal: Improve Substance Use Status     Start Date: 1/10/2023 Expected End Date: 3/1/2023    Note:     Barriers: Current Alcohol use. Need for Detox.   Strengths: Accepting of supports and detox options.   Patient expressed understanding of goal: yes  Action steps to achieve this goal:  1. I will review myChart received from Care Coordination with detox options in my area.   2. I will call detox locations to seek bed availability.   3. I will outreach to care coordination with other questions or resource needs.                           Patient/Caregiver understanding: Pt reports understanding and denies any additional questions or concerns at this times.  CC engaged in AIDET communication during  encounter.    Outreach Frequency: monthly      Plan: Pt to continue to seek bed availability for detox. CC to follow up on CD resources as needed.     Terrell Devi [facility]  Clinic Care Coordinator  [facility]  [facility]  [phone]  [email or web address]

## 2023-01-10 NOTE — LETTER
[facility]  Patient Centered Plan of Care  About Me:        Patient Name:  Jung Severino    YOB: 1965  Age:         57 year old   [place] MRN:    [id number] Telephone Information:  Home Phone [phone]   Mobile [phone]   Home Phone [phone]       Address:  [address] [place] [address] Email address:  [email or web address]      Emergency Contact(s)    Name Relationship Lgl Grd Work Phone Home Phone Mobile Phone   1. CAROLYN SEVERINO Spouse  [phone]   2. KLEBER ABDULLAHI Friend   [phone]            Primary language:  English     needed? No   [employer]:  [phone] op. 1  Other communication barriers:No data recorded  Preferred Method of Communication:  Mail  Current living arrangement: No data recorded  Mobility Status/ Medical Equipment: No data recorded      Health Maintenance  Health Maintenance Reviewed:   Health Maintenance Due   Topic Date Due     YEARLY PREVENTIVE VISIT  Never done     ADVANCE CARE PLANNING  Never done     HEPATITIS B IMMUNIZATION (1 of 3 - 3-dose series) Never done     COVID-19 Vaccine (1) Never done     HIV SCREENING  Never done     HEPATITIS C SCREENING  Never done     DTAP/TDAP/TD IMMUNIZATION (1 - Tdap) Never done     ZOSTER IMMUNIZATION (1 of 2) Never done     COLORECTAL CANCER SCREENING  12/11/2017     Pneumococcal Vaccine: Pediatrics (0 to 5 Years) and At-Risk Patients (6 to 64 Years) (2 - PCV) 12/22/2017     PHQ-9  07/20/2022     INFLUENZA VACCINE (1) 09/01/2022     LIPID  03/05/2023       My Access Plan  Medical Emergency 911   Primary Clinic Line [facility] - [phone]   24 Hour Appointment Line [phone] or  [phone]) (toll-free)   24 Hour Nurse Line [phone] (toll-free)   Preferred Urgent Care No data recorded   Preferred Hospital No data recorded   Preferred Pharmacy [id number] - [place], [place] - [address]     Behavioral Health Crisis Line The National Suicide Prevention Lifeline at 1-160.512.5035 or Text/Call 988             My Care Team Members  Patient Care Team       Relationship Specialty Notifications Start End    Yasmin Rodríguez MD PCP - General Family Practice  3/14/16     Phone: [phone] Fax: [phone] [address] [facility] [address]    Melchor Sands PA-C Assigned PCP   1/16/22     Phone: [phone] Fax: [phone] [address] [facility] [address]    Terrell Devi LSW Lead Care Coordinator Primary Care - CC Admissions 1/10/23     Phone: [phone]                 My Care Plans  Self Management and Treatment Plan  Care Plan  Care Plan: Substance Use     Problem: Patient experiences a substance use disorder     Goal: Improve Substance Use Status     Start Date: 1/10/2023 Expected End Date: 3/1/2023    Note:     Barriers: Current Alcohol use. Need for Detox.   Strengths: Accepting of supports and detox options.   Patient expressed understanding of goal: yes  Action steps to achieve this goal:  1. I will review myChart received from Care Coordination with detox options in my area.   2. I will call detox locations to seek bed availability.   3. I will outreach to care coordination with other questions or resource needs.                              My Medical and Care Information  Problem List   Patient Active Problem List   Diagnosis     Family history of other cardiovascular diseases     Closed fracture of head of radius     Contusion of face, scalp, and neck except eye(s)     Injury, other and unspecified, hand, except finger     Malaise and fatigue     Anxiety     Mild major depression (H)     CARDIOVASCULAR SCREENING; LDL GOAL LESS THAN 130     Alcohol withdrawal syndrome (H)     C. difficile colitis     Alcohol abuse     Benign essential hypertension     Diverticulitis of colon     TAMMI (acute kidney  injury) (H)     Pneumonia due to 2019 novel coronavirus      Current Medications and Allergies:       Allergies   Allergen Reactions     Lisinopril      Watery eyes.     Sulfa Drugs      Tetanus [Tetanus Toxoids] Nausea and Vomiting     High Fever x4 days     Current Outpatient Medications   Medication     apixaban ANTICOAGULANT (ELIQUIS) 2.5 MG tablet     benzonatate (TESSALON) 100 MG capsule     fluticasone (FLONASE) 50 MCG/ACT nasal spray     gabapentin (NEURONTIN) 300 MG capsule     losartan (COZAAR) 50 MG tablet     melatonin 5 MG tablet     multivitamin, therapeutic with minerals (THERA-VIT-M) TABS tablet     ondansetron (ZOFRAN) 4 MG tablet     Thiamine HCl (VITAMIN B1 PO)     traZODone (DESYREL) 100 MG tablet     triamcinolone (KENALOG) 0.1 % external cream     No current facility-administered medications for this visit.         Care Coordination Start Date: 1/10/2023   Frequency of Care Coordination: monthly     Form Last Updated: 02/06/2023

## 2023-01-11 ENCOUNTER — HOSPITAL ENCOUNTER (EMERGENCY)
Facility: CLINIC | Age: 58
Discharge: HOME | End: 2023-01-11
Attending: EMERGENCY MEDICINE | Admitting: EMERGENCY MEDICINE
Payer: COMMERCIAL

## 2023-01-11 VITALS
WEIGHT: 197 LBS | BODY MASS INDEX: 30.92 KG/M2 | RESPIRATION RATE: 18 BRPM | SYSTOLIC BLOOD PRESSURE: 155 MMHG | OXYGEN SATURATION: 96 % | HEART RATE: 115 BPM | HEIGHT: 67 IN | TEMPERATURE: 98.4 F | DIASTOLIC BLOOD PRESSURE: 98 MMHG

## 2023-01-11 DIAGNOSIS — F10.220 ALCOHOL DEPENDENCE WITH UNCOMPLICATED INTOXICATION (H): ICD-10-CM

## 2023-01-11 LAB — ALCOHOL BREATH TEST: 0.23 (ref 0–0.01)

## 2023-01-11 PROCEDURE — 250N000013 HC RX MED GY IP 250 OP 250 PS 637: Performed by: EMERGENCY MEDICINE

## 2023-01-11 PROCEDURE — 99284 EMERGENCY DEPT VISIT MOD MDM: CPT | Performed by: EMERGENCY MEDICINE

## 2023-01-11 PROCEDURE — 99283 EMERGENCY DEPT VISIT LOW MDM: CPT | Performed by: EMERGENCY MEDICINE

## 2023-01-11 PROCEDURE — 82075 ASSAY OF BREATH ETHANOL: CPT | Performed by: EMERGENCY MEDICINE

## 2023-01-11 RX ORDER — DIAZEPAM 5 MG
5 TABLET ORAL ONCE
Status: COMPLETED | OUTPATIENT
Start: 2023-01-11 | End: 2023-01-11

## 2023-01-11 RX ORDER — GABAPENTIN 300 MG/1
300 CAPSULE ORAL SEE ADMIN INSTRUCTIONS
Qty: 10 CAPSULE | Refills: 0 | Status: SHIPPED | OUTPATIENT
Start: 2023-01-11 | End: 2023-01-12

## 2023-01-11 RX ADMIN — DIAZEPAM 5 MG: 5 TABLET ORAL at 20:12

## 2023-01-11 ASSESSMENT — ACTIVITIES OF DAILY LIVING (ADL): ADLS_ACUITY_SCORE: 18.25

## 2023-01-12 ENCOUNTER — PATIENT OUTREACH (OUTPATIENT)
Dept: CARE COORDINATION | Facility: CLINIC | Age: 58
End: 2023-01-12
Payer: COMMERCIAL

## 2023-01-12 ENCOUNTER — TELEPHONE (OUTPATIENT)
Dept: FAMILY MEDICINE | Facility: CLINIC | Age: 58
End: 2023-01-12

## 2023-01-12 DIAGNOSIS — F10.930 ALCOHOL WITHDRAWAL SYNDROME WITHOUT COMPLICATION (H): Primary | ICD-10-CM

## 2023-01-12 RX ORDER — GABAPENTIN 300 MG/1
CAPSULE ORAL
Qty: 10 CAPSULE | Refills: 0 | Status: SHIPPED | OUTPATIENT
Start: 2023-01-12 | End: 2023-03-21

## 2023-01-12 NOTE — DISCHARGE INSTRUCTIONS
Return to the emergency department if withdrawal symptoms are severe, there are any new symptoms or any cause for concern.

## 2023-01-12 NOTE — TELEPHONE ENCOUNTER
Called patient.  States today going a lot better today.  Went to best friend's house.  Stayed the night at his friend's.  Getting into treatment next week.  Has to be alcohol free 48 hours prior to assessment.  Going to try and get appt for assessment tomorrow and get into treatment next week.  He would like Dr. Rodríguez to send new RX for gabapentin to [facility] so he can  on his way home tonight.  He did not turn his paper RX in.  Please advise.  Bettie Cardenas RN

## 2023-01-12 NOTE — TELEPHONE ENCOUNTER
Dr. Rodríguez does not have appt available tomorrow.  Will postpone and check on him tomorrow.  He is aware gabapentin being sent and getting on his way home this afternoon.  Bettie Cardenas RN

## 2023-01-12 NOTE — ED PROVIDER NOTES
ED Provider Note  [facility]      History     Chief Complaint   Patient presents with     Alcohol Problem     Detox from alcohol, last drink 10 am, drinking 750 ml vodka daily.     HPI  Jung Severino is a 57 year old male who presents for alcohol detox.  Drinks 750 ml of vodka per day.  They have a history of withdrawal symptoms and have not had a seizure from alcohol in the past.  Last drink was just prior to arrival. No other drug use.  No suicidal or homicidal ideation.  No recent illness.  No other symptoms noted.    Past Medical History  Past Medical History:   Diagnosis Date     Alcohol abuse 12/29/2016     Anxiety 12/23/2009     Benign essential hypertension 3/5/2018     C. difficile colitis 12/29/2016     Fam hx-cardiovas dis NEC      Past Surgical History:   Procedure Laterality Date     NOT IN USE       apixaban ANTICOAGULANT (ELIQUIS) 2.5 MG tablet  benzonatate (TESSALON) 100 MG capsule  gabapentin (NEURONTIN) 300 MG capsule  losartan (COZAAR) 50 MG tablet  melatonin 5 MG tablet  multivitamin, therapeutic with minerals (THERA-VIT-M) TABS tablet  ondansetron (ZOFRAN) 4 MG tablet  Thiamine HCl (VITAMIN B1 PO)  traZODone (DESYREL) 100 MG tablet  triamcinolone (KENALOG) 0.1 % external cream  fluticasone (FLONASE) 50 MCG/ACT nasal spray      Allergies   Allergen Reactions     Lisinopril      Watery eyes.     Sulfa Drugs      Tetanus [Tetanus Toxoids] Nausea and Vomiting     High Fever x4 days     Family History  Family History   Problem Relation Age of Onset     Heart Disease Father      Cancer No family hx of      Diabetes No family hx of      Social History   Social History     Tobacco Use     Smoking status: Never     Smokeless tobacco: Current     Types: Chew     Tobacco comments:     chewing tobacco   Substance Use Topics     Alcohol use: Not Currently     Alcohol/week: 0.0 standard drinks     Drug use: No      Past medical history, past surgical history, medications,  "allergies, family history, and social history were reviewed with the patient. No additional pertinent items.      A medically appropriate review of systems was performed with pertinent positives and negatives noted in the HPI, and all other systems negative.    Physical Exam   BP: (!) 191/88  Pulse: 120  Temp: 98.4  F (36.9  C)  Resp: 16  Height: 170.2 cm (5' 7\")  Weight: 89.4 kg (197 lb)  SpO2: 100 %  Physical Exam  Vitals and nursing note reviewed.   Constitutional:       General: He is not in acute distress.     Appearance: He is well-developed. He is not diaphoretic.   HENT:      Head: Normocephalic and atraumatic.      Mouth/Throat:      Pharynx: No oropharyngeal exudate.   Eyes:      General: No scleral icterus.        Right eye: No discharge.         Left eye: No discharge.      Pupils: Pupils are equal, round, and reactive to light.   Cardiovascular:      Rate and Rhythm: Normal rate and regular rhythm.      Heart sounds: Normal heart sounds. No murmur heard.    No friction rub. No gallop.   Pulmonary:      Effort: Pulmonary effort is normal. No respiratory distress.      Breath sounds: Normal breath sounds. No wheezing.   Chest:      Chest wall: No tenderness.   Abdominal:      General: Bowel sounds are normal. There is no distension.      Palpations: Abdomen is soft.      Tenderness: There is no abdominal tenderness.   Musculoskeletal:         General: No tenderness or deformity. Normal range of motion.      Cervical back: Normal range of motion and neck supple.   Skin:     General: Skin is warm and dry.      Coloration: Skin is not pale.      Findings: No erythema or rash.   Neurological:      Mental Status: He is alert and oriented to person, place, and time.      Cranial Nerves: No cranial nerve deficit.           ED Course, Procedures, & Data      Procedures                      Results for orders placed or performed during the hospital encounter of 01/11/23   Alcohol breath test POCT     Status: " Abnormal   Result Value Ref Range    Alcohol Breath Test 0.231 (A) 0.00 - 0.01     Medications   diazepam (VALIUM) tablet 5 mg (5 mg Oral Given 1/11/23 2012)     Labs Ordered and Resulted from Time of ED Arrival to Time of ED Departure   ALCOHOL BREATH TEST POCT - Abnormal       Result Value    Alcohol Breath Test 0.231 (*)      No orders to display          Medical Decision Making  The patient presented with a problem that is a chronic illness mild to moderate exacerbation, progression, or side effect of treatment.    The patient's evaluation involved:  ordering and review of 1 test(s) (see separate area of note for details)    The patient's management involved prescription drug management and a decision regarding hospitalization.      Assessment & Plan    This is a 57-year-old male who presents for alcohol detox.  Last alcohol intake was earlier today.  Alcohol level is 0.231.  Exam demonstrates no acute abnormalities.  Patient was given diazepam for early withdrawal symptoms.  There are no withdrawal beds available here.  I discussed with [facility] or guarding intake.  On reevaluation patient states that he is not wanting go to this facility is calling for a ride.  We will discharge with a gabapentin taper.  Patient does have rehab set up.  Will discharge with return precautions.    I have reviewed the nursing notes. I have reviewed the findings, diagnosis, plan and need for follow up with the patient.    Discharge Medication List as of 1/11/2023  8:24 PM      START taking these medications    Details   gabapentin (NEURONTIN) 300 MG capsule Take 1 capsule (300 mg) by mouth See Admin Instructions, Disp-10 capsule, R-0, Local PrintOn day 1 take 300 mg every 6 hours On day 2 take 300 mg every 8 hours On day 3 take 300 mg every 12 hours Day 4 take 300 mg at bedtime             Final diagnoses:   Alcohol dependence with uncomplicated intoxication (H)       Moe Merchant DO  [facility] EMERGENCY  DEPARTMENT  1/11/2023     Moe Merchant,   01/11/23 0970

## 2023-01-12 NOTE — TELEPHONE ENCOUNTER
Dr. Rodríguez     Patient's wife calling to ask for Ativan to help with alcohol withdrawal   appt next week to get into treatment   Patient was to ER on 1/11/23 however there were no detox beds available in [facility]     Tracy Altamirano Registered Nurse  [facility]

## 2023-01-12 NOTE — TELEPHONE ENCOUNTER
Unfortunately I can't treat without seeing him, I recommend gabapentin taper down, and admission for alcohol detox.

## 2023-01-12 NOTE — PROGRESS NOTES
Clinic Care Coordination Contact  [facility]/Voicemail       Clinical Data: Care Coordinator Outreach  Outreach attempted x 1.  Left message on patient's voicemail with call back information and requested return call.  Plan:  Care Coordinator will try to reach patient again in 3-5 business days.    Terrell Devi [facility]  Clinic Care Coordinator  [facility]  [facility]  [phone]  [email or web address]

## 2023-01-12 NOTE — ED NOTES
Pt. Is schedule to go through a detox program next week.  Is seeking IV fluids and oral anxiety medication. Pt plans to   return home with wife this evening and forego detox bed due to extended wait time.

## 2023-01-12 NOTE — LETTER
[facility] CARE COORDINATION  [address]  [facility] [address]    March 20, 2023    Jung Severino  [address] [facility] [address]      Dear Jung,    I have been unsuccessful in reaching you since our last contact. At this time the Care Coordination team will make no further attempts to reach you, however this does not change your ability to continue receiving care from your providers at your primary care clinic. If you need additional support from a care coordinator in the future please contact your clinic.    All of us at [facility] are invested in your health and are here to assist you in meeting your goals.     Sincerely,    Terrell Devi, [facility]  Clinic Care Coordinator  [facility]  [phone]  [email or web address]

## 2023-01-12 NOTE — ED NOTES
AIDET: done    Security ( Screen ): done    Belongings:  [ 2 ] BAG(s)    CELLPHONE  [ YES]  WALLET   [ YES ]    >w/pt: clothing, cellphone x2      >rack:  Suitcase, black backpack, pt bag      >security:   none

## 2023-01-12 NOTE — TELEPHONE ENCOUNTER
Let's start on gabapentin, can we try to get him in tomorrow, or check on him via phone to see what symptoms of withdrawal does he have?

## 2023-01-12 NOTE — ED TRIAGE NOTES
Patient to ED for alcohol detox. Patient drinks 750ml of vodka daily with his last drink being today at 10am. Patient is calm and cooperative in the ED.      Triage Assessment     Row Name 01/11/23 8785       Triage Assessment (Adult)    Airway WDL WDL       Respiratory WDL    Respiratory WDL WDL       Skin Circulation/Temperature WDL    Skin Circulation/Temperature WDL WDL       Cardiac WDL    Cardiac WDL WDL       Peripheral/Neurovascular WDL    Peripheral Neurovascular WDL WDL

## 2023-01-13 NOTE — TELEPHONE ENCOUNTER
No symptoms currently. He did do the assessment today and is meeting with them on Monday to talk further.       He is picking up the gabapentin now.    FRANCIA Ryan on 1/13/2023 at 5:06 PM

## 2023-01-13 NOTE — TELEPHONE ENCOUNTER
Message #1 left for patient to return call to triage nurse     Tracy Altamirano, Registered Nurse  [facility]

## 2023-01-22 DIAGNOSIS — I10 ESSENTIAL HYPERTENSION: ICD-10-CM

## 2023-01-23 NOTE — TELEPHONE ENCOUNTER
Routing refill request to provider for review/approval because:  Labs not current:  creatinine  Patient needs to be seen because it has been more than 1 year since last office visit.  BP readings out of range.   BP Readings from Last 3 Encounters:   01/11/23 (!) 155/98   10/17/22 (!) 147/109   01/06/22 119/79     Debby Gregg RN   PAL (Patient Advocate Liason)  [facility]

## 2023-01-24 RX ORDER — LOSARTAN POTASSIUM 50 MG/1
TABLET ORAL
Qty: 90 TABLET | Refills: 0 | Status: SHIPPED | OUTPATIENT
Start: 2023-01-24 | End: 2023-03-21

## 2023-01-24 NOTE — TELEPHONE ENCOUNTER
I have seen patient for pneumonia one time with follow up. Will refill 1 time. However, needs visit for further refills with whomever pcp is.     -sarath kumar ,pac

## 2023-01-27 NOTE — TELEPHONE ENCOUNTER
LVM for pt to call back clinic, and sent Mobile Game Day message. Needs visit scheduled.    Joyce Chiu MA on 1/27/2023 at 2:50 PM

## 2023-02-06 NOTE — PROGRESS NOTES
Clinic Care Coordination Contact  [facility]/Voicemail    Referral Source: Care Team  Clinical Data: Care Coordinator Outreach  Outreach attempted x 2.  Left message on patient's voicemail with call back information and requested return call.  Plan:Care Coordinator will attempt to outreach again in 10 business days.     Terrell Devi [facility]  Clinic Care Coordinator  [facility] [place]  [phone]  [email or web address]

## 2023-03-20 NOTE — PROGRESS NOTES
Clinic Care Coordination Contact  [facility]/Voicemail    Referral Source: Care Team  Clinical Data: Care Coordinator Outreach  Outreach attempted x 3.  Left message on patient's voicemail with call back information and requested return call.  Plan: Care Coordinator will send disenrollment letter with care coordinator contact information via [employer]. Care Coordinator will do no further outreaches at this time.    Terrell Devi [facility]  Clinic Care Coordinator  [facility]  [phone]  [email or web address]

## 2023-03-21 ENCOUNTER — OFFICE VISIT (OUTPATIENT)
Dept: FAMILY MEDICINE | Facility: CLINIC | Age: 58
End: 2023-03-21
Payer: COMMERCIAL

## 2023-03-21 VITALS
DIASTOLIC BLOOD PRESSURE: 86 MMHG | BODY MASS INDEX: 34.98 KG/M2 | TEMPERATURE: 98.3 F | HEIGHT: 68 IN | SYSTOLIC BLOOD PRESSURE: 136 MMHG | HEART RATE: 105 BPM | RESPIRATION RATE: 20 BRPM | OXYGEN SATURATION: 98 % | WEIGHT: 230.8 LBS

## 2023-03-21 DIAGNOSIS — F51.01 PRIMARY INSOMNIA: ICD-10-CM

## 2023-03-21 DIAGNOSIS — F10.11 HISTORY OF ALCOHOL ABUSE: ICD-10-CM

## 2023-03-21 DIAGNOSIS — Z13.220 SCREENING FOR HYPERLIPIDEMIA: ICD-10-CM

## 2023-03-21 DIAGNOSIS — I10 ESSENTIAL HYPERTENSION: ICD-10-CM

## 2023-03-21 DIAGNOSIS — E66.01 MORBID OBESITY (H): ICD-10-CM

## 2023-03-21 DIAGNOSIS — Z00.00 ROUTINE GENERAL MEDICAL EXAMINATION AT A HEALTH CARE FACILITY: Primary | ICD-10-CM

## 2023-03-21 DIAGNOSIS — Z12.11 SCREEN FOR COLON CANCER: ICD-10-CM

## 2023-03-21 DIAGNOSIS — J30.1 SEASONAL ALLERGIC RHINITIS DUE TO POLLEN: ICD-10-CM

## 2023-03-21 DIAGNOSIS — B35.1 ONYCHOMYCOSIS: ICD-10-CM

## 2023-03-21 DIAGNOSIS — N18.2 CHRONIC KIDNEY DISEASE, STAGE 2 (MILD): ICD-10-CM

## 2023-03-21 DIAGNOSIS — Z11.59 NEED FOR HEPATITIS C SCREENING TEST: ICD-10-CM

## 2023-03-21 PROCEDURE — 99214 OFFICE O/P EST MOD 30 MIN: CPT | Mod: 25 | Performed by: PHYSICIAN ASSISTANT

## 2023-03-21 PROCEDURE — 99396 PREV VISIT EST AGE 40-64: CPT | Performed by: PHYSICIAN ASSISTANT

## 2023-03-21 RX ORDER — GABAPENTIN 300 MG/1
CAPSULE ORAL
Qty: 10 CAPSULE | Refills: 0 | Status: CANCELLED | OUTPATIENT
Start: 2023-03-21 | End: 2023-03-25

## 2023-03-21 RX ORDER — TRAZODONE HYDROCHLORIDE 100 MG/1
100 TABLET ORAL AT BEDTIME
Qty: 90 TABLET | Refills: 3 | Status: SHIPPED | OUTPATIENT
Start: 2023-03-21 | End: 2024-06-03

## 2023-03-21 RX ORDER — CICLOPIROX 80 MG/ML
SOLUTION TOPICAL
Qty: 6.6 ML | Refills: 11 | Status: SHIPPED | OUTPATIENT
Start: 2023-03-21

## 2023-03-21 RX ORDER — LOSARTAN POTASSIUM 50 MG/1
50 TABLET ORAL DAILY
Qty: 90 TABLET | Refills: 3 | Status: SHIPPED | OUTPATIENT
Start: 2023-03-21 | End: 2023-06-13

## 2023-03-21 RX ORDER — NALTREXONE HYDROCHLORIDE 50 MG/1
50 TABLET, FILM COATED ORAL DAILY
Qty: 90 TABLET | Refills: 1 | Status: SHIPPED | OUTPATIENT
Start: 2023-03-21 | End: 2023-06-13

## 2023-03-21 RX ORDER — FLUTICASONE PROPIONATE 50 MCG
1 SPRAY, SUSPENSION (ML) NASAL DAILY
Qty: 16 G | Refills: 11 | Status: SHIPPED | OUTPATIENT
Start: 2023-03-21 | End: 2024-07-01

## 2023-03-21 SDOH — ECONOMIC STABILITY: FOOD INSECURITY: WITHIN THE PAST 12 MONTHS, THE FOOD YOU BOUGHT JUST DIDN'T LAST AND YOU DIDN'T HAVE MONEY TO GET MORE.: NEVER TRUE

## 2023-03-21 SDOH — ECONOMIC STABILITY: INCOME INSECURITY: IN THE LAST 12 MONTHS, WAS THERE A TIME WHEN YOU WERE NOT ABLE TO PAY THE MORTGAGE OR RENT ON TIME?: NO

## 2023-03-21 SDOH — ECONOMIC STABILITY: FOOD INSECURITY: WITHIN THE PAST 12 MONTHS, YOU WORRIED THAT YOUR FOOD WOULD RUN OUT BEFORE YOU GOT MONEY TO BUY MORE.: NEVER TRUE

## 2023-03-21 SDOH — ECONOMIC STABILITY: TRANSPORTATION INSECURITY
IN THE PAST 12 MONTHS, HAS THE LACK OF TRANSPORTATION KEPT YOU FROM MEDICAL APPOINTMENTS OR FROM GETTING MEDICATIONS?: NO

## 2023-03-21 SDOH — HEALTH STABILITY: PHYSICAL HEALTH: ON AVERAGE, HOW MANY MINUTES DO YOU ENGAGE IN EXERCISE AT THIS LEVEL?: 30 MIN

## 2023-03-21 SDOH — ECONOMIC STABILITY: TRANSPORTATION INSECURITY
IN THE PAST 12 MONTHS, HAS LACK OF TRANSPORTATION KEPT YOU FROM MEETINGS, WORK, OR FROM GETTING THINGS NEEDED FOR DAILY LIVING?: NO

## 2023-03-21 SDOH — HEALTH STABILITY: PHYSICAL HEALTH: ON AVERAGE, HOW MANY DAYS PER WEEK DO YOU ENGAGE IN MODERATE TO STRENUOUS EXERCISE (LIKE A BRISK WALK)?: 3 DAYS

## 2023-03-21 SDOH — ECONOMIC STABILITY: INCOME INSECURITY: HOW HARD IS IT FOR YOU TO PAY FOR THE VERY BASICS LIKE FOOD, HOUSING, MEDICAL CARE, AND HEATING?: NOT HARD AT ALL

## 2023-03-21 ASSESSMENT — LIFESTYLE VARIABLES
AUDIT-C TOTAL SCORE: 0
HOW OFTEN DO YOU HAVE SIX OR MORE DRINKS ON ONE OCCASION: NEVER
HOW OFTEN DO YOU HAVE A DRINK CONTAINING ALCOHOL: NEVER
SKIP TO QUESTIONS 9-10: 1
HOW MANY STANDARD DRINKS CONTAINING ALCOHOL DO YOU HAVE ON A TYPICAL DAY: PATIENT DOES NOT DRINK

## 2023-03-21 ASSESSMENT — SOCIAL DETERMINANTS OF HEALTH (SDOH)
HOW OFTEN DO YOU ATTEND CHURCH OR RELIGIOUS SERVICES?: MORE THAN 4 TIMES PER YEAR
DO YOU BELONG TO ANY CLUBS OR ORGANIZATIONS SUCH AS CHURCH GROUPS UNIONS, FRATERNAL OR ATHLETIC GROUPS, OR SCHOOL GROUPS?: YES
HOW OFTEN DO YOU GET TOGETHER WITH FRIENDS OR RELATIVES?: ONCE A WEEK
IN A TYPICAL WEEK, HOW MANY TIMES DO YOU TALK ON THE PHONE WITH FAMILY, FRIENDS, OR NEIGHBORS?: MORE THAN THREE TIMES A WEEK

## 2023-03-21 ASSESSMENT — ENCOUNTER SYMPTOMS
CHILLS: 0
CONSTIPATION: 0
NAUSEA: 0
COUGH: 0
JOINT SWELLING: 0
HEMATURIA: 0
SHORTNESS OF BREATH: 0
FEVER: 0
DIARRHEA: 0
HEMATOCHEZIA: 0
MYALGIAS: 0
FREQUENCY: 0
WEAKNESS: 0
HEADACHES: 0
SORE THROAT: 0
NERVOUS/ANXIOUS: 0
EYE PAIN: 0
HEARTBURN: 0
ARTHRALGIAS: 0
PARESTHESIAS: 0
PALPITATIONS: 0
DYSURIA: 0
DIZZINESS: 0
ABDOMINAL PAIN: 0

## 2023-03-21 ASSESSMENT — ANXIETY QUESTIONNAIRES
7. FEELING AFRAID AS IF SOMETHING AWFUL MIGHT HAPPEN: NOT AT ALL
5. BEING SO RESTLESS THAT IT IS HARD TO SIT STILL: NOT AT ALL
8. IF YOU CHECKED OFF ANY PROBLEMS, HOW DIFFICULT HAVE THESE MADE IT FOR YOU TO DO YOUR WORK, TAKE CARE OF THINGS AT HOME, OR GET ALONG WITH OTHER PEOPLE?: NOT DIFFICULT AT ALL
GAD7 TOTAL SCORE: 0
3. WORRYING TOO MUCH ABOUT DIFFERENT THINGS: NOT AT ALL
7. FEELING AFRAID AS IF SOMETHING AWFUL MIGHT HAPPEN: NOT AT ALL
1. FEELING NERVOUS, ANXIOUS, OR ON EDGE: NOT AT ALL
2. NOT BEING ABLE TO STOP OR CONTROL WORRYING: NOT AT ALL
GAD7 TOTAL SCORE: 0
IF YOU CHECKED OFF ANY PROBLEMS ON THIS QUESTIONNAIRE, HOW DIFFICULT HAVE THESE PROBLEMS MADE IT FOR YOU TO DO YOUR WORK, TAKE CARE OF THINGS AT HOME, OR GET ALONG WITH OTHER PEOPLE: NOT DIFFICULT AT ALL
6. BECOMING EASILY ANNOYED OR IRRITABLE: NOT AT ALL
4. TROUBLE RELAXING: NOT AT ALL
GAD7 TOTAL SCORE: 0

## 2023-03-21 ASSESSMENT — PAIN SCALES - GENERAL: PAINLEVEL: NO PAIN (0)

## 2023-03-21 ASSESSMENT — PATIENT HEALTH QUESTIONNAIRE - PHQ9
SUM OF ALL RESPONSES TO PHQ QUESTIONS 1-9: 0
SUM OF ALL RESPONSES TO PHQ QUESTIONS 1-9: 0

## 2023-03-21 NOTE — PATIENT INSTRUCTIONS
I prescribed naltrexone, but let's wait to start this until we verify your normal liver function.       Preventive Health Recommendations  Male Ages 50 - 64    Yearly exam:             See your health care provider every year in order to  o   Review health changes.   o   Discuss preventive care.    o   Review your medicines if your doctor has prescribed any.   Have a cholesterol test every 5 years, or more frequently if you are at risk for high cholesterol/heart disease.   Have a diabetes test (fasting glucose) every three years. If you are at risk for diabetes, you should have this test more often.   Have a colonoscopy at age 50, or have a yearly FIT test (stool test). These exams will check for colon cancer.    Talk with your health care provider about whether or not a prostate cancer screening test (PSA) is right for you.  You should be tested each year for STDs (sexually transmitted diseases), if you re at risk.     Shots: Get a flu shot each year. Get a tetanus shot every 10 years.     Nutrition:  Eat at least 5 servings of fruits and vegetables daily.   Eat whole-grain bread, whole-wheat pasta and brown rice instead of white grains and rice.   Get adequate Calcium and Vitamin D.     Lifestyle  Exercise for at least 150 minutes a week (30 minutes a day, 5 days a week). This will help you control your weight and prevent disease.   Limit alcohol to one drink per day.   No smoking.   Wear sunscreen to prevent skin cancer.   See your dentist every six months for an exam and cleaning.   See your eye doctor every 1 to 2 years.

## 2023-03-21 NOTE — PROGRESS NOTES
SUBJECTIVE:   CC: Nabeel is an 57 year old who presents for preventative health visit.     Patient has been advised of split billing requirements and indicates understanding: Yes     Healthy Habits:     Getting at least 3 servings of Calcium per day:  NO    Bi-annual eye exam:  Yes    Dental care twice a year:  NO    Sleep apnea or symptoms of sleep apnea:  None    Diet:  Regular (no restrictions)    Frequency of exercise:  2-3 days/week    Duration of exercise:  15-30 minutes    Taking medications regularly:  Yes    Medication side effects:  None    PHQ-2 Total Score: 0    Additional concerns today:  No         Hypertension Follow-up      Do you check your blood pressure regularly outside of the clinic? No     Are you following a low salt diet? No    Are your blood pressures ever more than 140 on the top number (systolic) OR more   than 90 on the bottom number (diastolic), for example 140/90? NA    History of microalbuminuria. Has checked in 2019. On losartan.       Past history of alcohol abuse. Sober for 2 months. Admits to ongoing cravings. No use during this time and no use while recently in [place]. Has noted increased weight over last 2 months. Denies sudden weight changes or abdominal pain. Of note, states never used gabapentin for withdrawal.     States for years, thickened and yellow left great toe nail. No treatments tried.     Today's PHQ-2 Score:   PHQ-2 ( 1999 Pfizer) 3/21/2023   Q1: Little interest or pleasure in doing things 0   Q2: Feeling down, depressed or hopeless 0   PHQ-2 Score 0   PHQ-2 Total Score (12-17 Years)- Positive if 3 or more points; Administer PHQ-A if positive -   Q1: Little interest or pleasure in doing things Not at all   Q2: Feeling down, depressed or hopeless Not at all   PHQ-2 Score 0       Have you ever done Advance Care Planning? (For example, a Health Directive, POLST, or a discussion with a medical provider or your loved ones about your wishes): No, advance care planning  information given to patient to review.  Patient plans to discuss their wishes with loved ones or provider.      Social History     Tobacco Use     Smoking status: Never     Smokeless tobacco: Current     Types: Chew     Tobacco comments:     chewing tobacco   Substance Use Topics     Alcohol use: Not Currently     Alcohol/week: 0.0 standard drinks         Alcohol Use 3/21/2023   Prescreen: >3 drinks/day or >7 drinks/week? Not Applicable       Last PSA: No results found for: PSA    Reviewed orders with patient. Reviewed health maintenance and updated orders accordingly - Yes  BP Readings from Last 3 Encounters:   03/21/23 136/86   01/11/23 (!) 155/98   10/17/22 (!) 147/109    Wt Readings from Last 3 Encounters:   03/21/23 104.7 kg (230 lb 12.8 oz)   01/11/23 89.4 kg (197 lb)   01/06/22 89.4 kg (197 lb)                  Patient Active Problem List   Diagnosis     Family history of other cardiovascular diseases     Closed fracture of head of radius     Contusion of face, scalp, and neck except eye(s)     Injury, other and unspecified, hand, except finger     Malaise and fatigue     Anxiety     Mild major depression (H)     CARDIOVASCULAR SCREENING; LDL GOAL LESS THAN 130     C. difficile colitis     Alcohol abuse     Benign essential hypertension     Diverticulitis of colon     TAMMI (acute kidney injury) (H)     Pneumonia due to 2019 novel coronavirus     Morbid obesity (H)     Chronic kidney disease, stage 2 (mild)     History of alcohol abuse     Past Surgical History:   Procedure Laterality Date     NOT IN USE         Social History     Tobacco Use     Smoking status: Never     Smokeless tobacco: Current     Types: Chew     Tobacco comments:     chewing tobacco   Substance Use Topics     Alcohol use: Not Currently     Alcohol/week: 0.0 standard drinks     Family History   Problem Relation Age of Onset     Heart Disease Father      Cancer No family hx of      Diabetes No family hx of          Current Outpatient  Medications   Medication Sig Dispense Refill     ciclopirox (PENLAC) 8 % external solution Apply to adjacent skin and affected nails daily.  Remove with alcohol every 7 days, then repeat. 6.6 mL 11     fluticasone (FLONASE) 50 MCG/ACT nasal spray Spray 1 spray into both nostrils daily 16 g 11     losartan (COZAAR) 50 MG tablet Take 1 tablet (50 mg) by mouth daily 90 tablet 3     multivitamin, therapeutic with minerals (THERA-VIT-M) TABS tablet Take 1 tablet by mouth daily 30 each 0     naltrexone (DEPADE/REVIA) 50 MG tablet Take 1 tablet (50 mg) by mouth daily 90 tablet 1     Thiamine HCl (VITAMIN B1 PO) Take 1 tablet by mouth daily       traZODone (DESYREL) 100 MG tablet Take 1 tablet (100 mg) by mouth At Bedtime 90 tablet 3     triamcinolone (KENALOG) 0.1 % external cream Apply topically 2 times daily 80 g 3     Allergies   Allergen Reactions     Lisinopril      Watery eyes.     Sulfa Drugs      Tetanus [Tetanus Toxoids] Nausea and Vomiting     High Fever x4 days     Recent Labs   Lab Test 10/17/22  1355 10/17/22  1329 10/17/22  1039 01/06/22  1054 12/25/21  0632 12/24/21  1324 12/24/21  0826 12/14/21  1345 04/03/20  1355 03/24/20  2024 03/24/20 2009 03/05/18  1353 12/18/16  0510 12/17/16  0550 12/13/16  0505 12/12/16  0552   A1C  --   --   --   --   --  6.3*  --   --   --   --   --   --   --   --   --  5.5   LDL  --   --   --   --   --   --   --   --   --   --   --  121*  --   --   --   --    HDL  --   --   --   --   --   --   --   --   --   --   --  63  --   --   --   --    TRIG  --   --   --   --   --   --   --   --   --   --   --  194*  --  96  --   --    ALT  --   --  116* 35  --   --  60   < > 41  --    < >  --    < >  --   --   --    CR 1.3 1.3 1.44* 0.96   < >  --  0.84   < > 0.91  --    < > 0.84   < > 0.75   < > 0.74   GFRESTIMATED  --  >60 56* >90   < >  --  >90   < > >90 69   < > >90   < > >90  Non  GFR Calc     < > >90  Non  GFR Calc     GFRESTBLACK  --   --   --   --    "--   --   --   --  >90 84   < > >90   < > >90  African American GFR Calc     < > >90   GFR Calc     POTASSIUM 3.6  --  3.7 4.7   < >  --  3.8   < > 3.8  --    < > 3.9   < > 4.9   < > 3.4   TSH  --   --   --   --   --   --   --   --   --   --   --  1.83  --   --   --   --     < > = values in this interval not displayed.        Reviewed and updated as needed this visit by clinical staff   Tobacco  Allergies  Meds  Problems  Med Hx  Surg Hx  Fam Hx          Reviewed and updated as needed this visit by Provider   Tobacco  Allergies  Meds  Problems  Med Hx  Surg Hx  Fam Hx         Past Medical History:   Diagnosis Date     Alcohol abuse 12/29/2016     Anxiety 12/23/2009     Benign essential hypertension 3/5/2018     C. difficile colitis 12/29/2016     Fam hx-cardiovas dis NEC       Past Surgical History:   Procedure Laterality Date     NOT IN USE         Review of Systems   Constitutional: Negative for chills and fever.   HENT: Negative for congestion, ear pain, hearing loss and sore throat.    Eyes: Negative for pain and visual disturbance.   Respiratory: Negative for cough and shortness of breath.    Cardiovascular: Negative for chest pain, palpitations and peripheral edema.   Gastrointestinal: Negative for abdominal pain, constipation, diarrhea, heartburn, hematochezia and nausea.   Genitourinary: Negative for dysuria, frequency, genital sores, hematuria, impotence, penile discharge and urgency.   Musculoskeletal: Negative for arthralgias, joint swelling and myalgias.   Skin: Negative for rash.   Neurological: Negative for dizziness, weakness, headaches and paresthesias.   Psychiatric/Behavioral: Negative for mood changes. The patient is not nervous/anxious.        OBJECTIVE:   /86   Pulse 105   Temp 98.3  F (36.8  C) (Pulmonary Artery)   Resp 20   Ht 1.727 m (5' 8\")   Wt 104.7 kg (230 lb 12.8 oz)   SpO2 98%   BMI 35.09 kg/m      Physical Exam  GENERAL: alert, no distress and " obese  EYES: Eyes grossly normal to inspection, PERRL and conjunctivae and sclerae normal  HENT: ear canals and TM's normal, nose and mouth without ulcers or lesions  NECK: no adenopathy, no asymmetry, masses, or scars and thyroid normal to palpation  RESP: lungs clear to auscultation - no rales, rhonchi or wheezes  CV: regular rate and rhythm, normal S1 S2, no S3 or S4, no murmur, click or rub, no peripheral edema and peripheral pulses strong  ABDOMEN: bowel sounds normal and liver questionably enlarged with question fluid wave. No tenderness to palpation.   MS: no gross musculoskeletal defects noted, no edema  SKIN: thickened distal left great toe nail. Otherwise, no suspicious lesions or rashes  NEURO: Normal strength and tone, mentation intact and speech normal  PSYCH: mentation appears normal, affect normal/bright    Diagnostic Test Results:  none     ASSESSMENT/PLAN:   (Z00.00) Routine general medical examination at a health care facility  (primary encounter diagnosis)  Comment: other than below, wellness stable. Not fasting. Fasting lab only appt scheduled.   Plan: Comprehensive metabolic panel (BMP + Alb, Alk         Phos, ALT, AST, Total. Bili, TP)            (Z11.59) Need for hepatitis C screening test  Comment:   Plan: Hepatitis C Screen Reflex to HCV RNA Quant and         Genotype            (Z12.11) Screen for colon cancer  Comment: due.   Plan: COLOGUARD([employer])            (Z13.220) Screening for hyperlipidemia  Comment:   Plan: Lipid panel reflex to direct LDL Non-fasting            (I10) Essential hypertension  Comment: stable though borderline. May  Be worsening due to weight gain. Focus on weight management and monitor. Will maintain current medication for now.   Plan: losartan (COZAAR) 50 MG tablet        Medication use and side effects discussed with the patient. Patient is in complete understanding and agreement with plan.       (J30.1) Seasonal allergic rhinitis due to pollen  Comment:  refill needed.   Plan: fluticasone (FLONASE) 50 MCG/ACT nasal spray        Medication use and side effects discussed with the patient. Patient is in complete understanding and agreement with plan.       (F10.11) History of alcohol abuse  Comment: sober 2 months. Discussed weight gain and cravings. Naltrexone may be good option. However, I am concerned for possible hepatic/cirrhosis with ascites on exam. Will first obtain lfts. If wnl, to start naltrexone and to recheck q 3 months while using. If alcohol restarted, to stop naltrexone with use. Of note, given sobriety for 60 days, naltrexone likely to aid better in cravings than if started sooner to sobriety starting.   Plan: naltrexone (DEPADE/REVIA) 50 MG tablet        Medication use and side effects discussed with the patient. Patient is in complete understanding and agreement with plan.       (E66.01) Morbid obesity (H)  Comment: present due to htn and bmi. as above. 30 lbs in 2 months. Labs pending. Thyroid to be added on as well. If all wnl, discussed diet and exercise and naltrexone to aid as well.   Plan:     (N18.2) Chronic kidney disease, stage 2 (mild)  Comment: overdue for recheck. On arb. If greater than 300, consider nephrology consult.   Plan: Albumin Random Urine Quantitative with Creat         Ratio            (F51.01) Primary insomnia  Comment: stable on current regimen. Refilled.   Plan: traZODone (DESYREL) 100 MG tablet        Medication use and side effects discussed with the patient. Patient is in complete understanding and agreement with plan.       (B35.1) Onychomycosis  Comment: present on exam. Topical management to be attempted. If no improvement in 3 months, consider podiatry consult. Will avoid PO management for now given naltrexone use likely as well as possible elevated lfts.   Plan: ciclopirox (PENLAC) 8 % external solution        Medication use and side effects discussed with the patient. Patient is in complete understanding and  "agreement with plan.         Patient has been advised of split billing requirements and indicates understanding: Yes      COUNSELING:   Reviewed preventive health counseling, as reflected in patient instructions       Regular exercise       Healthy diet/nutrition       Immunizations    Declined: Covid-19, Hepatitis B and Pneumococcal due to Other deferred to different date               Colorectal cancer screening      BMI:   Estimated body mass index is 35.09 kg/m  as calculated from the following:    Height as of this encounter: 1.727 m (5' 8\").    Weight as of this encounter: 104.7 kg (230 lb 12.8 oz).   Weight management plan: Discussed healthy diet and exercise guidelines      He reports that he has never smoked. His smokeless tobacco use includes chew.      Melchor Sands PA-C  [facility]  Answers for HPI/ROS submitted by the patient on 3/21/2023  PHQ9 TOTAL SCORE: 0  ASHISH 7 TOTAL SCORE: 0      "

## 2023-03-24 ENCOUNTER — LAB (OUTPATIENT)
Dept: LAB | Facility: CLINIC | Age: 58
End: 2023-03-24
Payer: COMMERCIAL

## 2023-03-24 ENCOUNTER — ALLIED HEALTH/NURSE VISIT (OUTPATIENT)
Dept: FAMILY MEDICINE | Facility: CLINIC | Age: 58
End: 2023-03-24
Payer: COMMERCIAL

## 2023-03-24 DIAGNOSIS — Z23 NEED FOR PNEUMOCOCCAL VACCINE: Primary | ICD-10-CM

## 2023-03-24 DIAGNOSIS — Z23 NEED FOR COVID-19 VACCINE: ICD-10-CM

## 2023-03-24 DIAGNOSIS — N18.2 CHRONIC KIDNEY DISEASE, STAGE 2 (MILD): ICD-10-CM

## 2023-03-24 DIAGNOSIS — T50.Z95S ADVERSE EFFECT OF VACCINE, SEQUELA: ICD-10-CM

## 2023-03-24 DIAGNOSIS — Z11.59 NEED FOR HEPATITIS C SCREENING TEST: ICD-10-CM

## 2023-03-24 DIAGNOSIS — Z00.00 ROUTINE GENERAL MEDICAL EXAMINATION AT A HEALTH CARE FACILITY: ICD-10-CM

## 2023-03-24 DIAGNOSIS — E66.01 MORBID OBESITY (H): ICD-10-CM

## 2023-03-24 DIAGNOSIS — Z13.220 SCREENING FOR HYPERLIPIDEMIA: ICD-10-CM

## 2023-03-24 DIAGNOSIS — Z11.4 SCREENING FOR HIV (HUMAN IMMUNODEFICIENCY VIRUS): Primary | ICD-10-CM

## 2023-03-24 LAB
ALBUMIN SERPL BCG-MCNC: 4.4 G/DL (ref 3.5–5.2)
ALP SERPL-CCNC: 66 U/L (ref 40–129)
ALT SERPL W P-5'-P-CCNC: 27 U/L (ref 10–50)
ANION GAP SERPL CALCULATED.3IONS-SCNC: 13 MMOL/L (ref 7–15)
AST SERPL W P-5'-P-CCNC: 37 U/L (ref 10–50)
BILIRUB SERPL-MCNC: 0.5 MG/DL
BUN SERPL-MCNC: 27.2 MG/DL (ref 8–23)
CALCIUM SERPL-MCNC: 10.1 MG/DL (ref 8.6–10)
CHLORIDE SERPL-SCNC: 105 MMOL/L (ref 98–107)
CHOLEST SERPL-MCNC: 187 MG/DL
CREAT SERPL-MCNC: 1.13 MG/DL (ref 0.67–1.17)
CREAT UR-MCNC: 80.5 MG/DL
DEPRECATED HCO3 PLAS-SCNC: 22 MMOL/L (ref 22–29)
GFR SERPL CREATININE-BSD FRML MDRD: 75 ML/MIN/1.73M2
GLUCOSE SERPL-MCNC: 106 MG/DL (ref 70–99)
HCV AB SERPL QL IA: NONREACTIVE
HDLC SERPL-MCNC: 44 MG/DL
HIV 1+2 AB+HIV1 P24 AG SERPL QL IA: NONREACTIVE
LDLC SERPL CALC-MCNC: 117 MG/DL
MICROALBUMIN UR-MCNC: <12 MG/L
MICROALBUMIN/CREAT UR: NORMAL MG/G{CREAT}
NONHDLC SERPL-MCNC: 143 MG/DL
POTASSIUM SERPL-SCNC: 5 MMOL/L (ref 3.4–5.3)
PROT SERPL-MCNC: 7.9 G/DL (ref 6.4–8.3)
SODIUM SERPL-SCNC: 140 MMOL/L (ref 136–145)
T4 FREE SERPL-MCNC: 0.78 NG/DL (ref 0.9–1.7)
TRIGL SERPL-MCNC: 128 MG/DL
TSH SERPL DL<=0.005 MIU/L-ACNC: 5.52 UIU/ML (ref 0.3–4.2)

## 2023-03-24 PROCEDURE — 91305 COVID-19 VACCINE 12+ (PFIZER): CPT

## 2023-03-24 PROCEDURE — 80053 COMPREHEN METABOLIC PANEL: CPT

## 2023-03-24 PROCEDURE — 87389 HIV-1 AG W/HIV-1&-2 AB AG IA: CPT

## 2023-03-24 PROCEDURE — 99207 PR NO CHARGE NURSE ONLY: CPT

## 2023-03-24 PROCEDURE — 82570 ASSAY OF URINE CREATININE: CPT

## 2023-03-24 PROCEDURE — 82043 UR ALBUMIN QUANTITATIVE: CPT

## 2023-03-24 PROCEDURE — 80061 LIPID PANEL: CPT

## 2023-03-24 PROCEDURE — 84439 ASSAY OF FREE THYROXINE: CPT

## 2023-03-24 PROCEDURE — 84443 ASSAY THYROID STIM HORMONE: CPT

## 2023-03-24 PROCEDURE — 90677 PCV20 VACCINE IM: CPT

## 2023-03-24 PROCEDURE — 0051A COVID-19 VACCINE 12+ (PFIZER): CPT

## 2023-03-24 PROCEDURE — 36415 COLL VENOUS BLD VENIPUNCTURE: CPT

## 2023-03-24 PROCEDURE — 90471 IMMUNIZATION ADMIN: CPT

## 2023-03-24 PROCEDURE — 86803 HEPATITIS C AB TEST: CPT

## 2023-03-24 NOTE — PROGRESS NOTES
Prior to immunization administration, verified patients identity using patient s name and date of birth. Please see Immunization Activity for additional information.     Screening Questionnaire for Adult Immunization    Are you sick today?   No   Do you have allergies to medications, food, a vaccine component or latex?   Yes   Have you ever had a serious reaction after receiving a vaccination?   Yes   Do you have a long-term health problem with heart, lung, kidney, or metabolic disease (e.g., diabetes), asthma, a blood disorder, no spleen, complement component deficiency, a cochlear implant, or a spinal fluid leak?  Are you on long-term aspirin therapy?   No   Do you have cancer, leukemia, HIV/AIDS, or any other immune system problem?   No   Do you have a parent, brother, or sister with an immune system problem?   No   In the past 3 months, have you taken medications that affect  your immune system, such as prednisone, other steroids, or anticancer drugs; drugs for the treatment of rheumatoid arthritis, Crohn s disease, or psoriasis; or have you had radiation treatments?   No   Have you had a seizure, or a brain or other nervous system problem?   No   During the past year, have you received a transfusion of blood or blood    products, or been given immune (gamma) globulin or antiviral drug?   No   For women: Are you pregnant or is there a chance you could become       pregnant during the next month?   N/A   Have you received any vaccinations in the past 4 weeks?   No     Immunization questionnaire was positive for at least one answer.  Notified PCP Dr Rodríguez.    I have reviewed the following standing orders:   This patient is due and qualifies for the Covid-19 vaccine.     Click here for COVID-19 Standing Order    I have reviewed the vaccines inclusion and exclusion criteria; No concerns regarding eligibility.   This patient is due and qualifies for the Pneumococcal vaccine.    Click here for Pneumococcal (Adult)  Standing Order    I have reviewed the vaccines inclusion and exclusion criteria;No concerns regarding eligibility.         Injection of Covid-19 pfizer(1st dose) and Prevnar 20 given by Joseline Núñez MA. Patient instructed to remain in clinic for 30 minutes afterwards per Dr Rodríguez, and to report any adverse reactions.     Screening performed by Joseline Núñez MA on 3/24/2023 at 9:10 AM.

## 2023-03-24 NOTE — PROGRESS NOTES
Hx of high fever reaction to Tdap when he was 18 years old, I think it is safe to proceed with both COVID and Prevnar at this time.  Watch for high fever reaction. In regards to his Tdap shot, will refer to immunology to see if there is any contraindication for this vaccine.

## 2023-03-25 DIAGNOSIS — F10.11 HISTORY OF ALCOHOL ABUSE: ICD-10-CM

## 2023-03-25 DIAGNOSIS — E03.9 HYPOTHYROIDISM, UNSPECIFIED TYPE: Primary | ICD-10-CM

## 2023-03-25 RX ORDER — LEVOTHYROXINE SODIUM 50 UG/1
50 TABLET ORAL DAILY
Qty: 90 TABLET | Refills: 0 | Status: SHIPPED | OUTPATIENT
Start: 2023-03-25 | End: 2023-07-19

## 2023-04-04 ENCOUNTER — LAB (OUTPATIENT)
Dept: FAMILY MEDICINE | Facility: CLINIC | Age: 58
End: 2023-04-04
Payer: COMMERCIAL

## 2023-04-04 DIAGNOSIS — Z12.11 SCREEN FOR COLON CANCER: ICD-10-CM

## 2023-05-15 ENCOUNTER — TRANSFERRED RECORDS (OUTPATIENT)
Dept: HEALTH INFORMATION MANAGEMENT | Facility: CLINIC | Age: 58
End: 2023-05-15

## 2023-06-06 ENCOUNTER — TRANSFERRED RECORDS (OUTPATIENT)
Dept: HEALTH INFORMATION MANAGEMENT | Facility: CLINIC | Age: 58
End: 2023-06-06
Payer: COMMERCIAL

## 2023-06-13 ENCOUNTER — OFFICE VISIT (OUTPATIENT)
Dept: FAMILY MEDICINE | Facility: CLINIC | Age: 58
End: 2023-06-13
Payer: COMMERCIAL

## 2023-06-13 VITALS
HEIGHT: 68 IN | OXYGEN SATURATION: 97 % | RESPIRATION RATE: 16 BRPM | TEMPERATURE: 98.3 F | HEART RATE: 86 BPM | DIASTOLIC BLOOD PRESSURE: 82 MMHG | SYSTOLIC BLOOD PRESSURE: 141 MMHG | WEIGHT: 220.4 LBS | BODY MASS INDEX: 33.4 KG/M2

## 2023-06-13 DIAGNOSIS — F10.11 HISTORY OF ALCOHOL ABUSE: Primary | ICD-10-CM

## 2023-06-13 DIAGNOSIS — M25.512 ACUTE PAIN OF LEFT SHOULDER: ICD-10-CM

## 2023-06-13 DIAGNOSIS — I10 ESSENTIAL HYPERTENSION: ICD-10-CM

## 2023-06-13 PROCEDURE — 99215 OFFICE O/P EST HI 40 MIN: CPT | Performed by: PHYSICIAN ASSISTANT

## 2023-06-13 RX ORDER — LOSARTAN POTASSIUM 100 MG/1
100 TABLET ORAL DAILY
Qty: 90 TABLET | Refills: 3 | Status: SHIPPED | OUTPATIENT
Start: 2023-06-13 | End: 2024-09-10

## 2023-06-13 NOTE — LETTER
June 13, 2023      Jung Severino  [id number] [id number]        To Whom It May Concern:    Jung Severino was seen in our clinic. He may return to work on July 3rd 2023 with restrictions. Nabeel needs to remain in the state of [place] due to ongoing medical care/appointments that requires him to be in the state. Restrictions in place through 7/31/23 unless further direction prior to that date.       Sincerely,        Francisca Carty PA-C

## 2023-06-13 NOTE — PROGRESS NOTES
Assessment & Plan     History of alcohol abuse  Patient has completed inpatient rehab. He is currently doing outpatient intensive treatment. He is looking for options as far as therapist after intensive outpatient treatment and also wondering about [facility] intensive outpatient treatment options for alcohol.  Referrals for both placed.  Note for work completed today. We will fax this for him. Plan on 2 more weeks off work and once back he will be restricted to working in the state as he will need to be in the state to complete the outpatient intensive treatment.  - Adult Mental Health  Referral; Future  - Adult Mental Health  Referral; Future    Essential hypertension  High today. Losartan dose increased while he was inpatient for rehab. Updated prescription sent. No further adjustments today. We will recheck at next visit.  - losartan (COZAAR) 100 MG tablet; Take 1 tablet (100 mg) by mouth daily    Acute pain of left shoulder  Follow up with PT. Seems muscular in nature as it is worse with active movement and present in the triceps muscle area. Still has good range of motion of the shoulder itself.  - Physical Therapy Referral; Future    Prescription drug management  45 minutes spent by me on the date of the encounter doing chart review, history and exam, documentation and further activities per the note         Francisca Carty PA-C  [facility]    Carlton Lees is a 57 year old, presenting for the following health issues:  Patient Request for Note/Letter (Return to work note for about 7/3/2023) and Pain (Back Left upper arm after vaccines 3/24/23)        6/13/2023     9:22 AM   Additional Questions   Roomed by Joseline Núñez CMA   Accompanied by self     Pain    History of Present Illness       Reason for visit:  Return to work note after inpatient tratment for alcohol    He eats 2-3 servings of fruits and vegetables daily.He consumes 2 sweetened beverage(s)  "daily.He exercises with enough effort to increase his heart rate 10 to 19 minutes per day.  He exercises with enough effort to increase his heart rate 4 days per week.   He is taking medications regularly.     Patient went into Inpatient alcohol rehab on 5/15/23 and returned home on 6/2/23.  He started intensive outpatient 6/6/23: Monday through Thursday from 5:30-8:30    He travels a lot for work and will not be able to continue intensive outpatient if he is out of state. He is still recovering from his inpatient stay and getting accustomed to being home.    Had FMLA completed by in patient at [facility].    Pain History:  When did you first notice your pain? 3/25/23   Have you seen anyone else for your pain? No  How has your pain affected your ability to work? 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 Musculoskeletal problem/pain  Onset/Duration: 3/25/23  Description  Location: upper arm - left  Joint Swelling: No  Redness: No  Pain: YES- with movement (extension of the arm, abduction of the arm)  Warmth: No  Intensity:  8/10  Progression of Symptoms:  same  Accompanying signs and symptoms:   Fevers: No  Numbness/tingling/weakness: No  History  Trauma to the area: No  Recent illness:  No  Previous similar problem: No  Previous evaluation:  No  Precipitating or alleviating factors:  Aggravating factors include: lifting and stretching the arm  Therapies tried and outcome: nothing and Ibuprofen      Review of Systems   GENERAL:  No fevers  MUSCULOSKELETAL: As noted in HPI            Objective    BP (!) 141/82 (BP Location: Right arm, Patient Position: Sitting, Cuff Size: Adult Large)   Pulse 86   Temp 98.3  F (36.8  C) (Oral)   Resp 16   Ht 1.727 m (5' 8\")   Wt 100 kg (220 lb 6.4 oz)   SpO2 97%   BMI 33.51 kg/m    Body mass index is 33.51 kg/m .  Physical Exam   GENERAL: No acute distress  HEENT: Normocephalic  MSK: Tenderness along " the left triceps muscle.  NEURO: Alert and non-focal  PSYCH: Anxious affect

## 2023-06-14 ENCOUNTER — TELEPHONE (OUTPATIENT)
Dept: FAMILY MEDICINE | Facility: CLINIC | Age: 58
End: 2023-06-14
Payer: COMMERCIAL

## 2023-06-14 NOTE — TELEPHONE ENCOUNTER
Forms/Letter Request    Type of form/letter: Work    Have you been seen for this request: Yes Dr. Francisca Carty    Do we have the form/letter: Yes: Return to work letter     Who is the form from? Human resources (if other please explain)    Where did/will the form come from? Return to work letter 6/13/23    When is form/letter needed by: asap    How would you like the form/letter returned: Faxed: [phone] or Email [email or web address]    Patient Notified form requests are processed in 3-5 business days:Yes    Could we send this information to you in [employer] or would you prefer to receive a phone call?:   Patient would prefer a phone call   Okay to leave a detailed message?: Yes at Cell number on file:    Telephone Information:   Mobile [phone]

## 2023-06-14 NOTE — TELEPHONE ENCOUNTER
Called patient.  His HR is stating they did not get letter.  Please fax.  Bettie Cardenas RN    Note for work completed today. We will fax this for him. Plan on 2 more weeks off work and once back he will be restricted to working in the state as he will need to be in the state to complete the outpatient intensive treatment.

## 2023-07-02 DIAGNOSIS — L30.8 OTHER ECZEMA: ICD-10-CM

## 2023-07-05 RX ORDER — TRIAMCINOLONE ACETONIDE 1 MG/G
CREAM TOPICAL
Qty: 80 G | Refills: 3 | Status: SHIPPED | OUTPATIENT
Start: 2023-07-05 | End: 2024-07-01

## 2023-07-05 NOTE — TELEPHONE ENCOUNTER
Prescription approved per [facility] Refill Protocol.  Jan Caldera RN on 7/5/2023 at 11:06 AM

## 2023-07-16 DIAGNOSIS — E03.9 HYPOTHYROIDISM, UNSPECIFIED TYPE: ICD-10-CM

## 2023-07-18 NOTE — TELEPHONE ENCOUNTER
Routing refill request to provider for review/approval because:  Labs out of range:  TSH    TSH   Date Value Ref Range Status   03/24/2023 5.52 (H) 0.30 - 4.20 uIU/mL Final   03/05/2018 1.83 0.40 - 4.00 mU/L Final     Susan JENKINS RN  [facility]

## 2023-07-19 RX ORDER — LEVOTHYROXINE SODIUM 50 UG/1
TABLET ORAL
Qty: 30 TABLET | Refills: 0 | Status: SHIPPED | OUTPATIENT
Start: 2023-07-19 | End: 2023-08-29

## 2023-07-19 NOTE — TELEPHONE ENCOUNTER
Will given jojo period for synthroid. Patient needs to follow up on TSH and Hepatic lab per [facility] visit in March and initiation of Synthroid. Please notify patient of these labs.    JUANA Singh CNP

## 2023-07-24 ENCOUNTER — OFFICE VISIT (OUTPATIENT)
Dept: FAMILY MEDICINE | Facility: CLINIC | Age: 58
End: 2023-07-24
Attending: PHYSICIAN ASSISTANT
Payer: COMMERCIAL

## 2023-07-24 VITALS
DIASTOLIC BLOOD PRESSURE: 100 MMHG | SYSTOLIC BLOOD PRESSURE: 140 MMHG | TEMPERATURE: 98.2 F | HEIGHT: 68 IN | HEART RATE: 83 BPM | OXYGEN SATURATION: 96 % | BODY MASS INDEX: 32.98 KG/M2 | WEIGHT: 217.6 LBS

## 2023-07-24 DIAGNOSIS — E03.9 HYPOTHYROIDISM, UNSPECIFIED TYPE: ICD-10-CM

## 2023-07-24 DIAGNOSIS — E66.01 MORBID OBESITY (H): ICD-10-CM

## 2023-07-24 DIAGNOSIS — I10 ESSENTIAL HYPERTENSION: ICD-10-CM

## 2023-07-24 DIAGNOSIS — F10.11 HISTORY OF ALCOHOL ABUSE: Primary | ICD-10-CM

## 2023-07-24 DIAGNOSIS — Z12.11 SCREEN FOR COLON CANCER: ICD-10-CM

## 2023-07-24 DIAGNOSIS — L72.0 EPIDERMOID CYST OF SKIN OF BACK: ICD-10-CM

## 2023-07-24 DIAGNOSIS — K21.9 GASTROESOPHAGEAL REFLUX DISEASE WITHOUT ESOPHAGITIS: ICD-10-CM

## 2023-07-24 LAB
ALBUMIN SERPL BCG-MCNC: 4.6 G/DL (ref 3.5–5.2)
ALP SERPL-CCNC: 81 U/L (ref 40–129)
ALT SERPL W P-5'-P-CCNC: 28 U/L (ref 0–70)
AST SERPL W P-5'-P-CCNC: 45 U/L (ref 0–45)
BILIRUB DIRECT SERPL-MCNC: <0.2 MG/DL (ref 0–0.3)
BILIRUB SERPL-MCNC: 0.5 MG/DL
PROT SERPL-MCNC: 8.1 G/DL (ref 6.4–8.3)
TSH SERPL DL<=0.005 MIU/L-ACNC: 1.54 UIU/ML (ref 0.3–4.2)

## 2023-07-24 PROCEDURE — 36415 COLL VENOUS BLD VENIPUNCTURE: CPT | Performed by: PHYSICIAN ASSISTANT

## 2023-07-24 PROCEDURE — 84443 ASSAY THYROID STIM HORMONE: CPT | Performed by: PHYSICIAN ASSISTANT

## 2023-07-24 PROCEDURE — 99214 OFFICE O/P EST MOD 30 MIN: CPT | Performed by: PHYSICIAN ASSISTANT

## 2023-07-24 PROCEDURE — 80076 HEPATIC FUNCTION PANEL: CPT | Performed by: PHYSICIAN ASSISTANT

## 2023-07-24 RX ORDER — TOPIRAMATE 25 MG/1
TABLET, FILM COATED ORAL
Qty: 67 TABLET | Refills: 0 | Status: SHIPPED | OUTPATIENT
Start: 2023-07-24 | End: 2023-09-07 | Stop reason: SINTOL

## 2023-07-24 NOTE — PROGRESS NOTES
Assessment & Plan     History of alcohol abuse  Doing well with sobriety. He is continuing intensive outpatient treatment with jesusita [facility].  - Hepatic panel (Albumin, ALT, AST, Bili, Alk Phos, TP)    Epidermoid cyst of skin of back  He would like to have removed. Referral placed to general surgery.  - Adult General Surg Referral; Future    Gastroesophageal reflux disease without esophagitis  Refilled for patient.  - omeprazole (PRILOSEC) 20 MG DR capsule; Take 1 capsule (20 mg) by mouth daily    Screen for colon cancer  Has Geovanna at home. Will send it in soon.    Morbid obesity (H)  Work on weight loss by adding topiramate. I will reach out in 2 weeks to see how he is doing. Side effects reviewed with patient.  - topiramate (TOPAMAX) 25 MG tablet; Take 1 tablet (25 mg) by mouth daily for 7 days, THEN 2 tablets (50 mg) daily for 30 days.    Hypothyroidism, unspecified type  Recheck today.  - TSH with free T4 reflex    Essential hypertension  Continue on current medication. He is going to work on lifestyle changes. Given information about DASH diet. We will work on weight loss with topiramate.      Review of external notes as documented elsewhere in note  Prescription drug management  33 minutes spent by me on the date of the encounter doing chart review, history and exam, documentation and further activities per the note         Francisca Carty PA-C  [facility]    Carlton Lees is a 57 year old, presenting for the following health issues:  Thyroid Problem (Is the medication helping with the thyroid), Derm Problem (Middle left side of the back (cyst) check on it, burst), and Musculoskeletal Problem (Left arm from Covid Shot- reaching in or stretching is a shut down pain)      7/24/2023     9:25 AM   Additional Questions   Roomed by ginna duran     Musculoskeletal Problem    History of Present Illness       Hypertension: He presents for follow up of hypertension.   He does not check blood pressure  regularly outside of the clinic. Outside blood pressures have been over 140/90. He does not follow a low salt diet.     Hypothyroidism:     Since last visit, patient describes the following symptoms::  Anxiety and Weight gain    Weight gain::  5 lbs.    Reason for visit:  Follow up    He eats 2-3 servings of fruits and vegetables daily.He consumes 1 sweetened beverage(s) daily.He exercises with enough effort to increase his heart rate 20 to 29 minutes per day.  He exercises with enough effort to increase his heart rate 4 days per week.   He is taking medications regularly.    Answers submitted by the patient for this visit:  Hypertension Visit (Submitted on 7/24/2023)  Chief Complaint: Chronic problems general questions HPI Form  Do you check your blood pressure regularly outside of the clinic?: No  Are your blood pressures ever more than 140 on the top number (systolic) OR more than 90 on the bottom number (diastolic)? (For example, greater than 140/90): Yes  Are you following a low salt diet?: No  Hypothyroid  (Submitted on 7/24/2023)  Chief Complaint: Chronic problems general questions HPI Form  Since last visit, patient describes the following symptoms:: Anxiety, Weight gain  Weight Gain (Submitted on 7/24/2023)  Chief Complaint: Chronic problems general questions HPI Form  Weight gain:: 5 lbs.  General Questionnaire (Submitted on 7/24/2023)  Chief Complaint: Chronic problems general questions HPI Form  What is the reason for your visit today? : follow up  How many servings of fruits and vegetables do you eat daily?: 2-3  On average, how many sweetened beverages do you drink each day (Examples: soda, juice, sweet tea, etc.  Do NOT count diet or artificially sweetened beverages)?: 1  How many minutes a day do you exercise enough to make your heart beat faster?: 20 to 29  How many days a week do you exercise enough to make your heart beat faster?: 4  How many days per week do you  "miss taking your medication?: 0     Doing intensive outpatient treatment for alcohol abuse (unsure how much longer, would like to continue through August, currently doing 4 nights a week).  Placed resignation in at current company (has been working since beginning of the month but only in [place].   He is starting a new job August 7th    Patient has a history a back cyst (has been drained previously). It has returned. He would like to have it removed again. At times it drains.      Review of Systems   GENERAL:  No fevers  MUSCULOSKELETAL: As noted in HPI          Objective    BP (!) 133/95 (BP Location: Right arm, Patient Position: Sitting, Cuff Size: Adult Large)   Pulse 83   Temp 98.2  F (36.8  C) (Oral)   Ht 1.727 m (5' 8\")   Wt 98.7 kg (217 lb 9.6 oz)   SpO2 96%   BMI 33.09 kg/m    Body mass index is 33.09 kg/m .  Physical Exam   GENERAL: No acute distress  HEENT: Normocephalic  SKIN: Small lump posterior left back with crusting that tracks to the lump. No current drainage.  NEURO: Alert and non-focal                  "

## 2023-07-24 NOTE — PROGRESS NOTES
"  {PROVIDER CHARTING PREFERENCE:703442}    Subjective   Nabeel is a 57 year old, presenting for the following health issues:  Thyroid Problem (Is the medication helping with the thyroid), Derm Problem (Middle left side of the back (cyst) check on it, burst), and Musculoskeletal Problem (Left arm from Covid Shot- reaching in or stretching is a shut down pain)      7/24/2023     9:25 AM   Additional Questions   Roomed by ginna duran     Musculoskeletal Problem    History of Present Illness       Hypertension: He presents for follow up of hypertension.  He does not check blood pressure  regularly outside of the clinic. Outside blood pressures have been over 140/90. He does not follow a low salt diet.     Hypothyroidism:     Since last visit, patient describes the following symptoms::  Anxiety and Weight gain    Weight gain::  5 lbs.    Reason for visit:  Follow up    He eats 2-3 servings of fruits and vegetables daily.He consumes 1 sweetened beverage(s) daily.He exercises with enough effort to increase his heart rate 20 to 29 minutes per day.  He exercises with enough effort to increase his heart rate 4 days per week.   He is taking medications regularly.       {SUPERLIST (Optional):781022}  {additonal problems for provider to add (Optional):019295}      Review of Systems   {ROS COMP (Optional):659989}      Objective    BP (!) 133/95 (BP Location: Right arm, Patient Position: Sitting, Cuff Size: Adult Large)   Pulse 83   Temp 98.2  F (36.8  C) (Oral)   Ht 1.727 m (5' 8\")   Wt 98.7 kg (217 lb 9.6 oz)   SpO2 96%   BMI 33.09 kg/m    Body mass index is 33.09 kg/m .  Physical Exam   {Exam List (Optional):951896}    {Diagnostic Test Results (Optional):063160}    {AMBULATORY ATTESTATION (Optional):614157}              "

## 2023-07-27 RX ORDER — TOPIRAMATE 25 MG/1
TABLET, FILM COATED ORAL
Qty: 162 TABLET | OUTPATIENT
Start: 2023-07-27

## 2023-08-02 ENCOUNTER — OFFICE VISIT (OUTPATIENT)
Dept: SURGERY | Facility: CLINIC | Age: 58
End: 2023-08-02
Payer: COMMERCIAL

## 2023-08-02 VITALS
SYSTOLIC BLOOD PRESSURE: 140 MMHG | DIASTOLIC BLOOD PRESSURE: 92 MMHG | HEIGHT: 68 IN | BODY MASS INDEX: 32.89 KG/M2 | WEIGHT: 217 LBS | HEART RATE: 85 BPM | OXYGEN SATURATION: 96 % | RESPIRATION RATE: 17 BRPM

## 2023-08-02 DIAGNOSIS — D23.5 DERMOID CYST OF SKIN OF BACK: Primary | ICD-10-CM

## 2023-08-02 PROCEDURE — 99204 OFFICE O/P NEW MOD 45 MIN: CPT | Performed by: STUDENT IN AN ORGANIZED HEALTH CARE EDUCATION/TRAINING PROGRAM

## 2023-08-02 RX ORDER — ACETAMINOPHEN 325 MG/1
975 TABLET ORAL ONCE
Status: CANCELLED | OUTPATIENT
Start: 2023-08-02 | End: 2023-08-02

## 2023-08-02 NOTE — PROGRESS NOTES
[facility]  New Patient Office Visit    Assessment:    Jung Severino is a 57 year old male with a cyst located on his left mid-back.     Plan:  I have offered Jung excision under MAC anesthesia.  He elects to proceed with excision and will schedule at his convenience.    We have discussed surgery in detail, including benefits, alternatives, complications, incision, scar, infection, anesthesia, bleeding, lifting and activity limits after surgery.  All questions have been answered to the best of my ability.    Time off work: 2-4 days   Time with restricted activity: none         HPI:  Jung Severino is a 57 year old male who presents today in consultation for a cyst on his left mid-back. It was infected back in 2017 requiring I and D. It then started draining again recently and he was seen in a derm clinic where another I and D was performed.     Duration:  several years   H/o trauma:  No  Drainage:  Yes  Previous infections:  Yes  Other such masses now or in the past:  No  Pain:  Yes  Size:  stable        PMH:  Past Medical History:   Diagnosis Date    Alcohol abuse 12/29/2016    Anxiety 12/23/2009    Benign essential hypertension 3/5/2018    C. difficile colitis 12/29/2016    [facility] hx-cardiovas dis NEC        PSH:  Past Surgical History:   Procedure Laterality Date    NOT IN USE         Allergies:  Allergies   Allergen Reactions    Lisinopril      Watery eyes.    Sulfa Antibiotics     Tetanus [Tetanus Toxoids] Nausea and Vomiting     High Fever x4 days       Home Medications:  Current Outpatient Medications   Medication Sig Dispense Refill    ciclopirox (PENLAC) 8 % external solution Apply to adjacent skin and affected nails daily.  Remove with alcohol every 7 days, then repeat. 6.6 mL 11    fluticasone (FLONASE) 50 MCG/ACT nasal spray Spray 1 spray into both nostrils daily 16 g 11    levothyroxine (SYNTHROID/LEVOTHROID) 50 MCG tablet TAKE 1 TABLET(50 MCG) BY MOUTH DAILY 30 tablet 0    losartan  "(COZAAR) 100 MG tablet Take 1 tablet (100 mg) by mouth daily 90 tablet 3    multivitamin, therapeutic with minerals (THERA-VIT-M) TABS tablet Take 1 tablet by mouth daily 30 each 0    omeprazole (PRILOSEC) 20 MG DR capsule Take 1 capsule (20 mg) by mouth daily 90 capsule 3    Thiamine HCl (VITAMIN B1 PO) Take 1 tablet by mouth daily      topiramate (TOPAMAX) 25 MG tablet Take 1 tablet (25 mg) by mouth daily for 7 days, THEN 2 tablets (50 mg) daily for 30 days. 67 tablet 0    traZODone (DESYREL) 100 MG tablet Take 1 tablet (100 mg) by mouth At Bedtime 90 tablet 3    triamcinolone (KENALOG) 0.1 % external cream APPLY TOPICALLY TO THE AFFECTED AREA TWICE DAILY 80 g 3       Social History:  Social History     Tobacco Use    Smoking status: Never    Smokeless tobacco: Current     Types: Chew    Tobacco comments:     chewing tobacco   Vaping Use    Vaping Use: Never used   Substance Use Topics    Alcohol use: Not Currently     Alcohol/week: 0.0 standard drinks of alcohol    Drug use: No       Family History:  Family History   Problem Relation Age of Onset    Heart Disease Father     Cancer No family hx of     Diabetes No family hx of        ROS:  The review of systems is negative other than noted in the HPI and above.    PE:  BP (!) 140/92   Pulse 85   Resp 17   Ht 1.727 m (5' 8\")   Wt 98.4 kg (217 lb)   SpO2 96%   BMI 32.99 kg/m    General appearance: well-nourished, no apparent distress  Skin: there is a small 1-2 cm subcutaneous cyst located on the left mid-back that is draining a small amount of cottage cheese like material, it is not actively infected     Imaging:   N/A         Ta Tobin MD      Please route or send letter to:  Primary Care Provider (PCP)      "

## 2023-08-07 ENCOUNTER — TELEPHONE (OUTPATIENT)
Dept: FAMILY MEDICINE | Facility: CLINIC | Age: 58
End: 2023-08-07
Payer: COMMERCIAL

## 2023-08-07 NOTE — TELEPHONE ENCOUNTER
Patient called back, states he has been traveling and started taking the Topiramate on 8/5/23. Patient states he will follow-up at the end of this week or next week to see how he is doing on the medication.     Routing to Francisca Carty PA-C as an KATHYA Robertson RN on 8/7/2023 at 12:48 PM

## 2023-08-07 NOTE — TELEPHONE ENCOUNTER
Called patient and left voicemail to call back and ask to speak to any triage nurse.    Karen BRIZUELA RN

## 2023-08-29 DIAGNOSIS — E03.9 HYPOTHYROIDISM, UNSPECIFIED TYPE: ICD-10-CM

## 2023-09-01 RX ORDER — LEVOTHYROXINE SODIUM 50 UG/1
50 TABLET ORAL DAILY
Qty: 30 TABLET | Refills: 3 | Status: SHIPPED | OUTPATIENT
Start: 2023-09-01 | End: 2024-01-02

## 2023-09-01 NOTE — TELEPHONE ENCOUNTER
Prescription approved per [facility] Refill Protocol.    Tracy Altamirano, Registered Nurse  [facility]

## 2023-09-01 NOTE — TELEPHONE ENCOUNTER
Prescription approved per [facility] Refill Protocol.    Tracy Altamirano, Registered Nurse  [facility]

## 2023-09-02 DIAGNOSIS — E66.01 MORBID OBESITY (H): ICD-10-CM

## 2023-09-06 NOTE — TELEPHONE ENCOUNTER
Message #1 left for patient to return call to triage nurse     Tracy Altamirano, Registered Nurse  [facility]

## 2023-09-06 NOTE — TELEPHONE ENCOUNTER
Routing refill request to provider for review/approval because:  Labs out of range:  CBC and platelets  Review authorizing provider's last note.    Lenore Ramirez RN, BSN  [facility]

## 2023-09-07 ENCOUNTER — TELEPHONE (OUTPATIENT)
Dept: FAMILY MEDICINE | Facility: CLINIC | Age: 58
End: 2023-09-07

## 2023-09-07 RX ORDER — TOPIRAMATE 25 MG/1
TABLET, FILM COATED ORAL
Qty: 67 TABLET | Refills: 0 | OUTPATIENT
Start: 2023-09-07

## 2023-09-07 NOTE — TELEPHONE ENCOUNTER
Message #2 left for patient to return call to triage nurse     Tracy Altamirano, Registered Nurse  [facility]

## 2023-09-07 NOTE — TELEPHONE ENCOUNTER
Patient calling back.  About a week ago he was on it and ankles were swelling.  He is not wanting refilled.  He is interested in thoughts on other options.  Discussed limited options.  Advised E-Visit if he wished to have another option.  Patient agrees with plan.  Please send denial and remove from med list.  Bettie Cardenas RN

## 2023-09-07 NOTE — TELEPHONE ENCOUNTER
Panel Management Review      Patient has the following on his problem list:   Hypertension   Last three blood pressure readings:  BP Readings from Last 3 Encounters:   08/02/23 (!) 140/92   07/24/23 (!) 140/100   06/13/23 (!) 141/82     Blood pressure: FAILED    HTN Guidelines:  Less than 140/90      Composite cancer screening  Chart review shows that this patient is due/due soon for the following Colonoscopy  Summary:    Patient is due/failing the following:   BP CHECK    Action needed:   Patient needs nurse only appointment.    Type of outreach:    Phone, left message for patient to call back.  and Sent Unbounce message.    Questions for provider review:    None                                                                                                                                    Marni Healy CMA       Chart routed to Care Team .

## 2023-10-27 ENCOUNTER — OFFICE VISIT (OUTPATIENT)
Dept: FAMILY MEDICINE | Facility: CLINIC | Age: 58
End: 2023-10-27
Payer: COMMERCIAL

## 2023-10-27 VITALS
RESPIRATION RATE: 14 BRPM | TEMPERATURE: 98.2 F | BODY MASS INDEX: 35.92 KG/M2 | HEART RATE: 89 BPM | OXYGEN SATURATION: 96 % | WEIGHT: 237 LBS | DIASTOLIC BLOOD PRESSURE: 110 MMHG | HEIGHT: 68 IN | SYSTOLIC BLOOD PRESSURE: 150 MMHG

## 2023-10-27 DIAGNOSIS — F10.11 HISTORY OF ALCOHOL ABUSE: ICD-10-CM

## 2023-10-27 DIAGNOSIS — E66.01 MORBID OBESITY (H): ICD-10-CM

## 2023-10-27 DIAGNOSIS — I10 BENIGN ESSENTIAL HYPERTENSION: ICD-10-CM

## 2023-10-27 DIAGNOSIS — I10 BENIGN ESSENTIAL HYPERTENSION: Primary | ICD-10-CM

## 2023-10-27 DIAGNOSIS — R63.5 WEIGHT GAIN: ICD-10-CM

## 2023-10-27 DIAGNOSIS — E03.9 HYPOTHYROIDISM, UNSPECIFIED TYPE: ICD-10-CM

## 2023-10-27 DIAGNOSIS — M62.08 DIASTASIS RECTI: ICD-10-CM

## 2023-10-27 LAB
ALBUMIN SERPL BCG-MCNC: 4.3 G/DL (ref 3.5–5.2)
ALP SERPL-CCNC: 94 U/L (ref 40–129)
ALT SERPL W P-5'-P-CCNC: 35 U/L (ref 0–70)
AST SERPL W P-5'-P-CCNC: 44 U/L (ref 0–45)
BILIRUB DIRECT SERPL-MCNC: <0.2 MG/DL (ref 0–0.3)
BILIRUB SERPL-MCNC: 0.3 MG/DL
PROT SERPL-MCNC: 7.6 G/DL (ref 6.4–8.3)
TSH SERPL DL<=0.005 MIU/L-ACNC: 2.76 UIU/ML (ref 0.3–4.2)

## 2023-10-27 PROCEDURE — 84443 ASSAY THYROID STIM HORMONE: CPT | Performed by: PHYSICIAN ASSISTANT

## 2023-10-27 PROCEDURE — 80076 HEPATIC FUNCTION PANEL: CPT | Performed by: PHYSICIAN ASSISTANT

## 2023-10-27 PROCEDURE — 99214 OFFICE O/P EST MOD 30 MIN: CPT | Performed by: PHYSICIAN ASSISTANT

## 2023-10-27 PROCEDURE — 36415 COLL VENOUS BLD VENIPUNCTURE: CPT | Performed by: PHYSICIAN ASSISTANT

## 2023-10-27 RX ORDER — HYDROCHLOROTHIAZIDE 25 MG/1
25 TABLET ORAL DAILY
Qty: 90 TABLET | OUTPATIENT
Start: 2023-10-27

## 2023-10-27 RX ORDER — PHENOL 1.4 %
10 AEROSOL, SPRAY (ML) MUCOUS MEMBRANE
COMMUNITY

## 2023-10-27 RX ORDER — HYDROCHLOROTHIAZIDE 25 MG/1
25 TABLET ORAL DAILY
Qty: 30 TABLET | Refills: 1 | Status: SHIPPED | OUTPATIENT
Start: 2023-10-27

## 2023-10-27 ASSESSMENT — ANXIETY QUESTIONNAIRES
6. BECOMING EASILY ANNOYED OR IRRITABLE: NOT AT ALL
5. BEING SO RESTLESS THAT IT IS HARD TO SIT STILL: NOT AT ALL
1. FEELING NERVOUS, ANXIOUS, OR ON EDGE: SEVERAL DAYS
2. NOT BEING ABLE TO STOP OR CONTROL WORRYING: NOT AT ALL
4. TROUBLE RELAXING: NOT AT ALL
GAD7 TOTAL SCORE: 2
7. FEELING AFRAID AS IF SOMETHING AWFUL MIGHT HAPPEN: NOT AT ALL
3. WORRYING TOO MUCH ABOUT DIFFERENT THINGS: SEVERAL DAYS
IF YOU CHECKED OFF ANY PROBLEMS ON THIS QUESTIONNAIRE, HOW DIFFICULT HAVE THESE PROBLEMS MADE IT FOR YOU TO DO YOUR WORK, TAKE CARE OF THINGS AT HOME, OR GET ALONG WITH OTHER PEOPLE: NOT DIFFICULT AT ALL
GAD7 TOTAL SCORE: 2

## 2023-10-27 ASSESSMENT — PATIENT HEALTH QUESTIONNAIRE - PHQ9
SUM OF ALL RESPONSES TO PHQ QUESTIONS 1-9: 3
10. IF YOU CHECKED OFF ANY PROBLEMS, HOW DIFFICULT HAVE THESE PROBLEMS MADE IT FOR YOU TO DO YOUR WORK, TAKE CARE OF THINGS AT HOME, OR GET ALONG WITH OTHER PEOPLE: NOT DIFFICULT AT ALL
SUM OF ALL RESPONSES TO PHQ QUESTIONS 1-9: 3

## 2023-10-27 NOTE — PROGRESS NOTES
Assessment & Plan     Benign essential hypertension  Not at goal.  STOP BANG is 6 showing him to be high risk and I think blood pressure may be high secondary to possible sleep apnea. Sleep medicine referral placed.   Added hydrochlorothiazide in the mean time.   I worry about adding a CCB as he has had swelling in the past with blood pressure medications.  Could also consider spironolactone.  Check BMP in 2 weeks with nurse only BP check.  - hydrochlorothiazide (HYDRODIURIL) 25 MG tablet; Take 1 tablet (25 mg) by mouth daily  - Basic metabolic panel  (Ca, Cl, CO2, Creat, Gluc, K, Na, BUN); Future    Diastasis recti  Discussed etiology. Given handout regarding exercises. He is seeing a . Offered PT but he declines today.    Weight gain  Recheck TSH to see if needs adjustment in levothyroxine dose.  - TSH with free T4 reflex; Future  - TSH with free T4 reflex    Morbid obesity (H)- complicated by hypertension  Patient did try topiramate but did not like side effects (swelling in the legs).  He cannot take any stimulants given his blood pressure.   He is open to Wegovy. I will send this in and if not covered we will try Wellbutrin 150 mg XR and naltrexone 25 mg instead.  Follow up in 6-8 weeks for recheck.  - Semaglutide-Weight Management (WEGOVY) 0.25 MG/0.5ML pen; Inject 0.25 mg Subcutaneous once a week  - Semaglutide-Weight Management (WEGOVY) 0.5 MG/0.5ML pen; Inject 0.5 mg Subcutaneous once a week  - Adult Sleep Eval & Management  Referral; Future    Hypothyroidism, unspecified type  Recheck today.  - TSH with free T4 reflex; Future  - TSH with free T4 reflex    History of alcohol abuse  Recheck today. He has been sober for about 6 months.  - Hepatic panel (Albumin, ALT, AST, Bili, Alk Phos, TP)    Review of external notes as documented elsewhere in note  Ordering of each unique test  Prescription drug management  32 minutes spent by me on the date of the encounter doing chart review,  "history and exam, documentation and further activities per the note         Francisca Carty PA-C  [facility] SANDY Lees is a 58 year old, presenting for the following health issues:  Thyroid Problem        10/27/2023     8:49 AM   Additional Questions   Roomed by Marjorie EUBANKS CMA       History of Present Illness       Reason for visit:  Fatigue weight gain    He eats 2-3 servings of fruits and vegetables daily.He consumes 0 sweetened beverage(s) daily.He exercises with enough effort to increase his heart rate 10 to 19 minutes per day.  He exercises with enough effort to increase his heart rate 3 or less days per week.   He is taking medications regularly.       Hypothyroidism Follow-up    Since last visit, patient describes the following symptoms: weight gain and fatigue- previously when this occurred he was found to have low thyroid.    Patient stated having a bulge on his abdomen when he exercises          Review of Systems   GENERAL:  No fevers        Objective    BP (!) 150/110   Pulse 89   Temp 98.2  F (36.8  C) (Oral)   Resp 14   Ht 1.727 m (5' 8\")   Wt 107.5 kg (237 lb)   SpO2 96%   BMI 36.04 kg/m    Body mass index is 36.04 kg/m .  Physical Exam   GENERAL: No acute distress  HEENT: Normocephalic  NEURO: Alert and non-focal      Physician Attestation   I, Francisca Carty PA-C, was present with the medical/ALEX student who participated in the service and in the documentation of the note.  I have verified the history and personally performed the physical exam and medical decision making.  I agree with the assessment and plan of care as documented in the note.      Items personally reviewed: agree with the interpretation documented in the note.    Francisca Carty PA-C              "

## 2023-11-13 ENCOUNTER — LAB (OUTPATIENT)
Dept: LAB | Facility: CLINIC | Age: 58
End: 2023-11-13
Attending: PHYSICIAN ASSISTANT
Payer: COMMERCIAL

## 2023-11-13 DIAGNOSIS — I10 BENIGN ESSENTIAL HYPERTENSION: ICD-10-CM

## 2023-11-13 LAB
ANION GAP SERPL CALCULATED.3IONS-SCNC: 11 MMOL/L (ref 7–15)
BUN SERPL-MCNC: 22.5 MG/DL (ref 8–23)
CALCIUM SERPL-MCNC: 9.6 MG/DL (ref 8.8–10.2)
CHLORIDE SERPL-SCNC: 100 MMOL/L (ref 98–107)
CREAT SERPL-MCNC: 1.26 MG/DL (ref 0.67–1.17)
DEPRECATED HCO3 PLAS-SCNC: 27 MMOL/L (ref 22–29)
EGFRCR SERPLBLD CKD-EPI 2021: 65 ML/MIN/1.73M2
GLUCOSE SERPL-MCNC: 133 MG/DL (ref 70–99)
POTASSIUM SERPL-SCNC: 4.6 MMOL/L (ref 3.4–5.3)
SODIUM SERPL-SCNC: 138 MMOL/L (ref 135–145)

## 2023-11-13 PROCEDURE — 36415 COLL VENOUS BLD VENIPUNCTURE: CPT

## 2023-11-13 PROCEDURE — 80048 BASIC METABOLIC PNL TOTAL CA: CPT

## 2023-11-30 ENCOUNTER — TELEPHONE (OUTPATIENT)
Dept: FAMILY MEDICINE | Facility: CLINIC | Age: 58
End: 2023-11-30
Payer: COMMERCIAL

## 2023-11-30 NOTE — TELEPHONE ENCOUNTER
Prior Auth Needed:     Disp Refills Start End SOHA   Semaglutide-Weight Management (WEGOVY) 0.25 MG/0.5ML pen 2 mL 0 10/27/2023  --   Sig - Route: Inject 0.25 mg Subcutaneous once a week - Subcutaneous   Sent to pharmacy as: Semaglutide-Weight Management 0.25 MG/0.5ML Subcutaneous Solution Auto-injector (WEGOVY)   Class: E-Prescribe   Order: [id number]        Disp Refills Start End SOHA   Semaglutide-Weight Management (WEGOVY) 0.5 MG/0.5ML pen 2 mL 0 10/27/2023  --   Sig - Route: Inject 0.5 mg Subcutaneous once a week - Subcutaneous   Sent to pharmacy as: Semaglutide-Weight Management 0.5 MG/0.5ML Subcutaneous Solution Auto-injector (WEGOVY)   Class: E-Prescribe   Order: [id number]     Key: [id number]  Patient Last Name: Mere  : 1965

## 2023-12-05 NOTE — TELEPHONE ENCOUNTER
Central Prior Authorization Team   Phone: [phone]    PA Initiation    Medication: Wegovy Auto-injectors  Insurance Company: [employer] - Phone [phone] Fax [phone]  Pharmacy Filling the Rx: [id number] [address]  [address]  Filling Pharmacy Phone: [phone]  Filling Pharmacy Fax: [phone]  Start Date: 12/5/2023

## 2023-12-08 ENCOUNTER — OFFICE VISIT (OUTPATIENT)
Dept: FAMILY MEDICINE | Facility: CLINIC | Age: 58
End: 2023-12-08
Attending: PHYSICIAN ASSISTANT
Payer: COMMERCIAL

## 2023-12-08 VITALS
HEIGHT: 68 IN | WEIGHT: 247.2 LBS | DIASTOLIC BLOOD PRESSURE: 108 MMHG | BODY MASS INDEX: 37.47 KG/M2 | OXYGEN SATURATION: 94 % | SYSTOLIC BLOOD PRESSURE: 160 MMHG | HEART RATE: 111 BPM | TEMPERATURE: 98.1 F | RESPIRATION RATE: 14 BRPM

## 2023-12-08 DIAGNOSIS — E66.01 MORBID OBESITY (H): Primary | ICD-10-CM

## 2023-12-08 DIAGNOSIS — I10 BENIGN ESSENTIAL HYPERTENSION: ICD-10-CM

## 2023-12-08 DIAGNOSIS — R63.5 WEIGHT GAIN: ICD-10-CM

## 2023-12-08 LAB
ANION GAP SERPL CALCULATED.3IONS-SCNC: 10 MMOL/L (ref 7–15)
BUN SERPL-MCNC: 24.3 MG/DL (ref 8–23)
CALCIUM SERPL-MCNC: 10 MG/DL (ref 8.8–10.2)
CHLORIDE SERPL-SCNC: 102 MMOL/L (ref 98–107)
CREAT SERPL-MCNC: 1.16 MG/DL (ref 0.67–1.17)
DEPRECATED HCO3 PLAS-SCNC: 28 MMOL/L (ref 22–29)
EGFRCR SERPLBLD CKD-EPI 2021: 72 ML/MIN/1.73M2
GLUCOSE SERPL-MCNC: 137 MG/DL (ref 70–99)
POTASSIUM SERPL-SCNC: 4.6 MMOL/L (ref 3.4–5.3)
SODIUM SERPL-SCNC: 140 MMOL/L (ref 135–145)

## 2023-12-08 PROCEDURE — 36415 COLL VENOUS BLD VENIPUNCTURE: CPT | Performed by: PHYSICIAN ASSISTANT

## 2023-12-08 PROCEDURE — 99214 OFFICE O/P EST MOD 30 MIN: CPT | Performed by: PHYSICIAN ASSISTANT

## 2023-12-08 PROCEDURE — 80048 BASIC METABOLIC PNL TOTAL CA: CPT | Performed by: PHYSICIAN ASSISTANT

## 2023-12-08 RX ORDER — BUPROPION HYDROCHLORIDE 150 MG/1
150 TABLET ORAL EVERY MORNING
Qty: 30 TABLET | Refills: 1 | Status: SHIPPED | OUTPATIENT
Start: 2023-12-08 | End: 2024-09-10

## 2023-12-08 RX ORDER — NALTREXONE HYDROCHLORIDE 50 MG/1
TABLET, FILM COATED ORAL
Qty: 30 TABLET | Refills: 1 | Status: SHIPPED | OUTPATIENT
Start: 2023-12-08

## 2023-12-08 RX ORDER — SPIRONOLACTONE 25 MG/1
25 TABLET ORAL DAILY
Qty: 30 TABLET | Refills: 1 | Status: SHIPPED | OUTPATIENT
Start: 2023-12-08 | End: 2024-09-10

## 2023-12-08 NOTE — TELEPHONE ENCOUNTER
Please let patient know that the Wegovy was denied by insurance. We already sent in an alternative that we discussed at his visit today.

## 2023-12-08 NOTE — PROGRESS NOTES
Assessment & Plan     Morbid obesity (H)  Wegovy has been hard to get. Will try Wellbutrin and naltrexone instead for now.  If able to get Wegovy can stop other medications.  Wegovy was denied by insurance- prior authorization confirmed today.  - buPROPion (WELLBUTRIN XL) 150 MG 24 hr tablet; Take 1 tablet (150 mg) by mouth every morning  - naltrexone (DEPADE/REVIA) 50 MG tablet; 25 mg daily    Weight gain    - buPROPion (WELLBUTRIN XL) 150 MG 24 hr tablet; Take 1 tablet (150 mg) by mouth every morning  - naltrexone (DEPADE/REVIA) 50 MG tablet; 25 mg daily    Benign essential hypertension  Not at goal. Recheck BMP today as creatinine was high previously. Recheck again in 2 weeks since we are adding spironolactone for better blood pressure control.  He is still working on getting sleep study as I do think sleep apnea is very likely for him. It may be difficult to control his blood pressure until this is treated.  - Basic metabolic panel  (Ca, Cl, CO2, Creat, Gluc, K, Na, BUN); Future  - spironolactone (ALDACTONE) 25 MG tablet; Take 1 tablet (25 mg) by mouth daily  - Basic metabolic panel  (Ca, Cl, CO2, Creat, Gluc, K, Na, BUN); Future  - Basic metabolic panel  (Ca, Cl, CO2, Creat, Gluc, K, Na, BUN)    Review of external notes as documented elsewhere in note  Review of the result(s) of each unique test - As noted above  Ordering of each unique test  Prescription drug management    Francisca Carty PA-C  [facility]    Carlton Lees is a 58 year old, presenting for the following health issues:  Recheck Medication        12/8/2023     8:58 AM   Additional Questions   Roomed by Berna Campbell       History of Present Illness       Hypertension: He presents for follow up of hypertension.  He does not check blood pressure  regularly outside of the clinic. Outside blood pressures have been over 140/90. He does not follow a low salt diet.     Hypothyroidism:     Since last visit, patient  "describes the following symptoms::  Fatigue and Weight gain    Weight gain::  5 lbs.    He eats 0-1 servings of fruits and vegetables daily.He consumes 1 sweetened beverage(s) daily.He exercises with enough effort to increase his heart rate 10 to 19 minutes per day.  He exercises with enough effort to increase his heart rate 4 days per week.   He is taking medications regularly.         Review of Systems   GENERAL:  No fevers        Objective    BP (!) 160/108   Pulse 111   Temp 98.1  F (36.7  C) (Oral)   Resp 14   Ht 1.727 m (5' 8\")   Wt 112.1 kg (247 lb 3.2 oz)   SpO2 94%   BMI 37.59 kg/m    Body mass index is 37.59 kg/m .  Physical Exam   GENERAL: No acute distress  HEENT: Normocephalic  NEURO: Alert and non-focal                "

## 2023-12-08 NOTE — TELEPHONE ENCOUNTER
PRIOR AUTHORIZATION DENIED    Medication: Wegovy Auto-injectors - DENIED    Denial Date: 12/8/2023    Denial Rational: INSURANCE STATES MEDICATION IS EXCLUDED FROM COVERAGE.  NO REVIEW AVAILABLE.      Appeal Information: N/A

## 2023-12-27 ENCOUNTER — ALLIED HEALTH/NURSE VISIT (OUTPATIENT)
Dept: FAMILY MEDICINE | Facility: CLINIC | Age: 58
End: 2023-12-27
Attending: PHYSICIAN ASSISTANT
Payer: COMMERCIAL

## 2023-12-27 VITALS — DIASTOLIC BLOOD PRESSURE: 88 MMHG | SYSTOLIC BLOOD PRESSURE: 133 MMHG | HEART RATE: 88 BPM

## 2023-12-27 DIAGNOSIS — I10 BENIGN ESSENTIAL HYPERTENSION: Primary | ICD-10-CM

## 2023-12-27 PROCEDURE — 99207 PR NO CHARGE NURSE ONLY: CPT

## 2023-12-27 NOTE — PROGRESS NOTES
Jung Severino is a 58 year old patient who comes in today for a Blood Pressure check.  Initial BP:  /88 (BP Location: Left arm, Patient Position: Sitting, Cuff Size: Adult Large)   Pulse 88      88  Disposition: results routed to provider.    Ne Grimaldo [facility]

## 2023-12-31 DIAGNOSIS — E03.9 HYPOTHYROIDISM, UNSPECIFIED TYPE: ICD-10-CM

## 2024-01-02 RX ORDER — LEVOTHYROXINE SODIUM 50 UG/1
50 TABLET ORAL DAILY
Qty: 90 TABLET | Refills: 2 | Status: SHIPPED | OUTPATIENT
Start: 2024-01-02 | End: 2024-09-06

## 2024-03-09 LAB — NONINV COLON CA DNA+OCC BLD SCRN STL QL: POSITIVE

## 2024-03-11 DIAGNOSIS — R19.5 POSITIVE COLORECTAL CANCER SCREENING USING COLOGUARD TEST: Primary | ICD-10-CM

## 2024-03-14 ENCOUNTER — MYC MEDICAL ADVICE (OUTPATIENT)
Dept: FAMILY MEDICINE | Facility: CLINIC | Age: 59
End: 2024-03-14
Payer: COMMERCIAL

## 2024-03-15 NOTE — TELEPHONE ENCOUNTER
Re-faxed referral to [place] @ [phone]    Sussy CARMONA, - Flex  Primary Care- [facility], Tianna Hobbs [place] [facility]

## 2024-03-15 NOTE — TELEPHONE ENCOUNTER
Faxed [facility] referral, sent Mychart response informing  Porsche Salgado, RN, BSN  [facility]

## 2024-03-16 NOTE — PROGRESS NOTES
[facility]  Hospitalist Progress Note    Tucker Hernandez MD 12/20/2021  Text Page      Reason for Stay (Diagnosis): Covid pneumonia         Assessment and Plan:      Summary of Stay: Jung Severino is a 56 year old  male with a PMH significant for HTN, obesity who developed fever and chills on 12/1 and confirmed COVID positive on 12/3. He continued to have intermittent fever (up to 103) and fatigue and was seen in the ED on 12/7; discharged as he was HD stable and not hypoxic. He returned yesterday to ED due to concerns for 20 lbs weight loss and episodes of hypoxia (upper 80's) since this weekend.  However he left ED after having only labs and CXR performed given the wait time. PCP called him  and informed him of ARF and asked him to return to the ED. He has not had Covid vaccine.     Work up in the ED  showed hypokalemia, ARF with Bun/Cr of 63/1.97. WBC normal, and concentrated UA. CXR shows opacities c/w PNA.   O2 sats was 88% with activity and 92% at rest. He was started on steroids and supplemental O2 and recommended for admission.      #Acute hypoxic respiratory failure due to COVID 19  # COVID 19 pneumonitis   -  hypoxia severe but stable over the last 24 hours, on high flow nasal cannula now at 30 L/min and 80% FiO2  - Inflammatory markers improving  - He everett had a CT angiogram which was negative for acute PE on 12/15  -  Pro calcitonin was .014, in the setting of progressive hypoxia, started empiric antibiotics with ceftriaxone and azithromycin on 12/15.  Stop ceftriaxone after tomorrow's dose.   -  Cont Dexamethasone started 12/14  -Completed remdesivir  -  Discussed Baricitintib with patient including the risks and benefits and emergency use authorization information.  He agreed to start this(12/18)  -  Aggressive Pulm toilet, IS  -  Cough support with tessalon/mucinex/robitussin     #Acute renal failure   -Resolved with IV hydration     #HTN  -Losartan restarted           #  "Code status:  Full, patient unsure of his CODE STATUS he would like to think about it further full code for now  # Anticipated discharge date and Disposition:  Several more days, when hypoxia improves  # DVT: Lovenox      Entered: Tucker Hernandez 12/20/2021, 11:38 AM               Interval History (Subjective):      He continues to self prone.  Feels ok but gets dyspneic easily.                    Physical Exam:      Last Vital Signs:  /76 (BP Location: Right arm)   Pulse 82   Temp 98  F (36.7  C) (Oral)   Resp 20   Ht 1.727 m (5' 8\")   Wt 89.3 kg (196 lb 12.8 oz)   SpO2 93%   BMI 29.92 kg/m      Vital signs reviewed  General:  Alert, calm, NAD  CV: regular rate and rhythm, no murmurs or rubs  Lungs:  Clear to ascultation bilaterally, normal respiratory effort  HEENT:  Pupil round, equal, conjuctivae, sclerae and lids normal, neck is supple  Abdomen:  Soft, nontender, nondistended, no masses, normal bowel sounds  Extremities:  No edema  Neuro: normal strength and sensation in all 4 extremities, cranial nerves grossly intact  Psychiatric:  Mood and affect within normal limits             Medications:      All current medications were reviewed with changes reflected in problem list.         Data:      All new lab and imaging data was reviewed.     " temporal

## 2024-03-22 ENCOUNTER — TELEPHONE (OUTPATIENT)
Dept: FAMILY MEDICINE | Facility: CLINIC | Age: 59
End: 2024-03-22
Payer: COMMERCIAL

## 2024-03-22 NOTE — TELEPHONE ENCOUNTER
Patient calls stating [facility] has not received order for colonoscopy and patient has been trying to schedule for weeks. Called [facility] and helped register patient, provided referral info for colonoscopy. They will reach out to patient today to schedule.     Aviva Banuelos RN on 3/22/2024 at 10:15 AM

## 2024-04-08 ENCOUNTER — TRANSFERRED RECORDS (OUTPATIENT)
Dept: HEALTH INFORMATION MANAGEMENT | Facility: CLINIC | Age: 59
End: 2024-04-08
Payer: COMMERCIAL

## 2024-05-25 ENCOUNTER — HEALTH MAINTENANCE LETTER (OUTPATIENT)
Age: 59
End: 2024-05-25

## 2024-06-02 DIAGNOSIS — F51.01 PRIMARY INSOMNIA: ICD-10-CM

## 2024-06-03 RX ORDER — TRAZODONE HYDROCHLORIDE 100 MG/1
100 TABLET ORAL AT BEDTIME
Qty: 90 TABLET | Refills: 3 | Status: SHIPPED | OUTPATIENT
Start: 2024-06-03

## 2024-06-29 DIAGNOSIS — E03.9 HYPOTHYROIDISM, UNSPECIFIED TYPE: ICD-10-CM

## 2024-06-29 DIAGNOSIS — J30.1 SEASONAL ALLERGIC RHINITIS DUE TO POLLEN: ICD-10-CM

## 2024-06-29 DIAGNOSIS — I10 BENIGN ESSENTIAL HYPERTENSION: ICD-10-CM

## 2024-06-29 DIAGNOSIS — K21.9 GASTROESOPHAGEAL REFLUX DISEASE WITHOUT ESOPHAGITIS: ICD-10-CM

## 2024-06-29 DIAGNOSIS — L30.8 OTHER ECZEMA: ICD-10-CM

## 2024-07-01 RX ORDER — LEVOTHYROXINE SODIUM 50 UG/1
50 TABLET ORAL DAILY
Qty: 90 TABLET | Refills: 2 | OUTPATIENT
Start: 2024-07-01

## 2024-07-01 RX ORDER — FLUTICASONE PROPIONATE 50 MCG
1 SPRAY, SUSPENSION (ML) NASAL DAILY
Qty: 16 G | Refills: 2 | Status: SHIPPED | OUTPATIENT
Start: 2024-07-01 | End: 2024-09-10

## 2024-07-01 RX ORDER — TRIAMCINOLONE ACETONIDE 1 MG/G
CREAM TOPICAL
Qty: 80 G | Refills: 0 | Status: SHIPPED | OUTPATIENT
Start: 2024-07-01 | End: 2024-09-06

## 2024-07-02 NOTE — TELEPHONE ENCOUNTER
Joe Toure, RN   to [facility]       7/1/24 10:58 AM  Break in medication?

## 2024-07-05 RX ORDER — HYDROCHLOROTHIAZIDE 25 MG/1
25 TABLET ORAL DAILY
Qty: 30 TABLET | Refills: 1 | OUTPATIENT
Start: 2024-07-05

## 2024-07-05 NOTE — TELEPHONE ENCOUNTER
RN spoke to patient     He accidentally asked for refill of hydrochlorothiazide, is not taking this any longer     RN denied refill    Discussed HM due and encouraged patient to schedule preventative visit     Tracy Altamirano Registered Nurse  [facility]

## 2024-07-31 DIAGNOSIS — I10 ESSENTIAL HYPERTENSION: ICD-10-CM

## 2024-08-01 RX ORDER — LOSARTAN POTASSIUM 100 MG/1
100 TABLET ORAL DAILY
Qty: 90 TABLET | Refills: 3 | OUTPATIENT
Start: 2024-08-01

## 2024-08-01 NOTE — TELEPHONE ENCOUNTER
LVM for patient to call the clinic at [phone] to schedule an appt before next refill.  Yara Orr TC

## 2024-08-02 NOTE — TELEPHONE ENCOUNTER
Called pt-lm to call clinic-appointment needed before medication can be refilled.    Gita Abdalla/ARPITA

## 2024-09-06 DIAGNOSIS — K21.9 GASTROESOPHAGEAL REFLUX DISEASE WITHOUT ESOPHAGITIS: ICD-10-CM

## 2024-09-06 DIAGNOSIS — I10 ESSENTIAL HYPERTENSION: ICD-10-CM

## 2024-09-06 DIAGNOSIS — L30.8 OTHER ECZEMA: ICD-10-CM

## 2024-09-06 DIAGNOSIS — E03.9 HYPOTHYROIDISM, UNSPECIFIED TYPE: ICD-10-CM

## 2024-09-06 RX ORDER — TRIAMCINOLONE ACETONIDE 1 MG/G
CREAM TOPICAL 2 TIMES DAILY
Qty: 80 G | Refills: 0 | Status: SHIPPED | OUTPATIENT
Start: 2024-09-06 | End: 2024-09-10

## 2024-09-06 RX ORDER — LEVOTHYROXINE SODIUM 50 UG/1
50 TABLET ORAL DAILY
Qty: 90 TABLET | Refills: 0 | Status: SHIPPED | OUTPATIENT
Start: 2024-09-06 | End: 2024-09-10

## 2024-09-06 NOTE — TELEPHONE ENCOUNTER
Pt requesting med refill asap, is completely out of losartan. Pt has appt scheduled 9/10 with Francisca Carty. Last visit 12/8/23. Please review, thank you!  Gómez Amaro RN on 9/6/2024 at 3:23 PM

## 2024-09-07 RX ORDER — LOSARTAN POTASSIUM 100 MG/1
100 TABLET ORAL DAILY
Qty: 90 TABLET | Refills: 3 | OUTPATIENT
Start: 2024-09-07

## 2024-09-09 ENCOUNTER — TELEPHONE (OUTPATIENT)
Dept: FAMILY MEDICINE | Facility: CLINIC | Age: 59
End: 2024-09-09
Payer: COMMERCIAL

## 2024-09-09 NOTE — TELEPHONE ENCOUNTER
Prior Authorization Retail Medication Request    Medication/Dose: omeprazole (PRILOSEC) 20 MG DR capsule   Diagnosis and ICD code (if different than what is on RX):    New/renewal/insurance change PA/secondary ins. PA:  Previously Tried and Failed:    Rationale:      Key: [id number]    Yara Orr, TC

## 2024-09-09 NOTE — LETTER
September 13, 2024      Jung Severino  [address] [address] [id number]          Dear Jung,    We have been trying to reach you, we left 3 voicemail messages and sent a my chart without response.     Please see information below - if you have further questions please call to speak to a triage nurse [phone]           Sincerely,    Tracy Altamirano, Registered Nurse on behalf of Franicsca Carty [facility]

## 2024-09-10 ENCOUNTER — VIRTUAL VISIT (OUTPATIENT)
Dept: FAMILY MEDICINE | Facility: CLINIC | Age: 59
End: 2024-09-10
Payer: COMMERCIAL

## 2024-09-10 DIAGNOSIS — E03.9 HYPOTHYROIDISM, UNSPECIFIED TYPE: ICD-10-CM

## 2024-09-10 DIAGNOSIS — J30.1 SEASONAL ALLERGIC RHINITIS DUE TO POLLEN: ICD-10-CM

## 2024-09-10 DIAGNOSIS — I10 ESSENTIAL HYPERTENSION: ICD-10-CM

## 2024-09-10 DIAGNOSIS — F41.9 ANXIETY: Primary | ICD-10-CM

## 2024-09-10 DIAGNOSIS — L30.8 OTHER ECZEMA: ICD-10-CM

## 2024-09-10 DIAGNOSIS — K21.9 GASTROESOPHAGEAL REFLUX DISEASE WITHOUT ESOPHAGITIS: ICD-10-CM

## 2024-09-10 DIAGNOSIS — F10.11 HISTORY OF ALCOHOL ABUSE: ICD-10-CM

## 2024-09-10 PROCEDURE — G2211 COMPLEX E/M VISIT ADD ON: HCPCS | Mod: 95 | Performed by: PHYSICIAN ASSISTANT

## 2024-09-10 PROCEDURE — 96127 BRIEF EMOTIONAL/BEHAV ASSMT: CPT | Mod: 95 | Performed by: PHYSICIAN ASSISTANT

## 2024-09-10 PROCEDURE — 99214 OFFICE O/P EST MOD 30 MIN: CPT | Mod: 95 | Performed by: PHYSICIAN ASSISTANT

## 2024-09-10 RX ORDER — TRIAMCINOLONE ACETONIDE 1 MG/G
CREAM TOPICAL 2 TIMES DAILY
Qty: 80 G | Refills: 1 | Status: SHIPPED | OUTPATIENT
Start: 2024-09-10

## 2024-09-10 RX ORDER — LOSARTAN POTASSIUM 100 MG/1
100 TABLET ORAL DAILY
Qty: 90 TABLET | Refills: 2 | Status: SHIPPED | OUTPATIENT
Start: 2024-09-10

## 2024-09-10 RX ORDER — SPIRONOLACTONE 25 MG/1
25 TABLET ORAL DAILY
Qty: 30 TABLET | Refills: 1 | Status: SHIPPED | OUTPATIENT
Start: 2024-09-10

## 2024-09-10 RX ORDER — LEVOTHYROXINE SODIUM 50 UG/1
50 TABLET ORAL DAILY
Qty: 90 TABLET | Refills: 2 | Status: SHIPPED | OUTPATIENT
Start: 2024-09-10

## 2024-09-10 RX ORDER — FLUTICASONE PROPIONATE 50 MCG
1 SPRAY, SUSPENSION (ML) NASAL DAILY
Qty: 16 G | Refills: 2 | Status: SHIPPED | OUTPATIENT
Start: 2024-09-10

## 2024-09-10 RX ORDER — SPIRONOLACTONE 25 MG/1
25 TABLET ORAL DAILY
Qty: 90 TABLET | OUTPATIENT
Start: 2024-09-10

## 2024-09-10 ASSESSMENT — ENCOUNTER SYMPTOMS: NERVOUS/ANXIOUS: 1

## 2024-09-10 ASSESSMENT — ANXIETY QUESTIONNAIRES
GAD7 TOTAL SCORE: 2
GAD7 TOTAL SCORE: 2
7. FEELING AFRAID AS IF SOMETHING AWFUL MIGHT HAPPEN: NOT AT ALL
8. IF YOU CHECKED OFF ANY PROBLEMS, HOW DIFFICULT HAVE THESE MADE IT FOR YOU TO DO YOUR WORK, TAKE CARE OF THINGS AT HOME, OR GET ALONG WITH OTHER PEOPLE?: NOT DIFFICULT AT ALL
GAD7 TOTAL SCORE: 2

## 2024-09-10 ASSESSMENT — PATIENT HEALTH QUESTIONNAIRE - PHQ9
SUM OF ALL RESPONSES TO PHQ QUESTIONS 1-9: 0
SUM OF ALL RESPONSES TO PHQ QUESTIONS 1-9: 0
10. IF YOU CHECKED OFF ANY PROBLEMS, HOW DIFFICULT HAVE THESE PROBLEMS MADE IT FOR YOU TO DO YOUR WORK, TAKE CARE OF THINGS AT HOME, OR GET ALONG WITH OTHER PEOPLE: NOT DIFFICULT AT ALL

## 2024-09-10 NOTE — PROGRESS NOTES
Nabeel is a 59 year old who is being evaluated via a billable video visit.    How would you like to obtain your AVS? MyChart  If the video visit is dropped, the invitation should be resent by: Text to cell phone: [phone]  Will anyone else be joining your video visit? No      Assessment & Plan     Anxiety  Having increased gagging and gag reflex. He associates this with anxiety.  Patient recently lost his job and feels he would benefit from some medications for anxiety.  Sertraline 50 mg started as noted below.  Will have close follow-up in 1 month and dose adjust as needed at that time.  Patient has history of alcohol abuse and reports relapse a couple weeks ago.  He is sober again but interested in addiction therapy.  Referral placed for him today.  - sertraline (ZOLOFT) 50 MG tablet; Take 1 tablet (50 mg) by mouth daily.    Essential hypertension  Patient reports he is out of his losartan.  He does not believe he ever started spironolactone that was prescribed last December for blood pressure management.  I recommended he restart both and recheck BMP in 2 weeks.  I reviewed last BMP in December 2023.  And this was normal.  - losartan (COZAAR) 100 MG tablet; Take 1 tablet (100 mg) by mouth daily.  - Basic metabolic panel  (Ca, Cl, CO2, Creat, Gluc, K, Na, BUN); Future  - spironolactone (ALDACTONE) 25 MG tablet; Take 1 tablet (25 mg) by mouth daily.    Hypothyroidism, unspecified type  Due for TSH soon.  Will obtain along with BMP as noted above.  Refill levothyroxine as noted below.  - levothyroxine (SYNTHROID/LEVOTHROID) 50 MCG tablet; Take 1 tablet (50 mcg) by mouth daily.  - TSH with free T4 reflex; Future    Gastroesophageal reflux disease without esophagitis  Reflux stable at this point.  He does find benefit with the omeprazole.  It is possible his increased gagging and gag reflex is related to his reflux however he is associating it with increased anxiety recently.  Continue to monitor.  - omeprazole  (PRILOSEC) 20 MG DR capsule; Take 1 capsule (20 mg) by mouth daily.    Other eczema  Refill triamcinolone cream to use as needed.  - triamcinolone (KENALOG) 0.1 % external cream; Apply topically 2 times daily.    Seasonal allergic rhinitis due to pollen  Refilled for patient.  - fluticasone (FLONASE) 50 MCG/ACT nasal spray; Spray 1 spray into both nostrils daily.    History of alcohol abuse  Has been through treatment as recently as last year.  Had recent relapse however is sober again.  He is interested in reconnecting with addiction medicine.  Referral placed today.  - Adult Mental Health  Referral; Future    Review of external notes as documented elsewhere in note  Review of the result(s) of each unique test - last BMP in December 2023  Ordering of each unique test  Prescription drug management  31 minutes spent by me on the date of the encounter doing chart review, history and exam, documentation and further activities per the note    The longitudinal plan of care for the diagnosis(es)/condition(s) as documented were addressed during this visit. Due to the added complexity in care, I will continue to support Nabeel in the subsequent management and with ongoing continuity of care.      Subjective   Nabeel is a 59 year old, presenting for the following health issues:  Recheck Medication (Follow up and med check / ) and Anxiety (Loss of job and lawsuit (increased anxiety))        9/10/2024     3:10 PM   Additional Questions   Roomed by ginna duran     History of Present Illness       Hypertension: He presents for follow up of hypertension.  He does not check blood pressure  regularly outside of the clinic. Outpatient blood pressures have not been over 140/90. He follows a low salt diet.     Hypothyroidism:     Since last visit, patient describes the following symptoms::  Anxiety    He eats 0-1 servings of fruits and vegetables daily.He consumes 2 sweetened beverage(s) daily.He exercises with enough effort  "to increase his heart rate 20 to 29 minutes per day.  He exercises with enough effort to increase his heart rate 5 days per week. He is missing 3 dose(s) of medications per week.  He is not taking prescribed medications regularly due to cost of medication.       Abnormal Mood Symptoms- patient recently lost his job and has had some anxiety, relapsed with alcohol use but sober again now.    Depression (if yes, do PHQ-9):   Anxiety (if yes, do ASHISH-7): YES  loss of job and law suit          3/21/2023     1:13 PM 10/27/2023     8:50 AM 9/10/2024     2:54 PM   PHQ   PHQ-9 Total Score 0 3 0   Q9: Thoughts of better off dead/self-harm past 2 weeks Not at all Not at all Not at all         3/21/2023     1:14 PM 10/27/2023     8:51 AM 9/10/2024     2:55 PM   ASHISH-7 SCORE   Total Score 0 (minimal anxiety) 2 (minimal anxiety) 2 (minimal anxiety)   Total Score 0 2 2           Objective    Vitals - Patient Reported  Weight (Patient Reported): 100.2 kg (221 lb)  Height (Patient Reported): 172.7 cm (5' 8\")  BMI (Based on Pt Reported Ht/Wt): 18.4  Pain Score: No Pain (0)        Physical Exam   GENERAL: alert and no distress  EYES: Eyes grossly normal to inspection.  No discharge or erythema, or obvious scleral/conjunctival abnormalities.  RESP: No audible wheeze, cough, or visible cyanosis.    SKIN: Visible skin clear. No significant rash, abnormal pigmentation or lesions.  NEURO: Cranial nerves grossly intact.  Mentation and speech appropriate for age.  PSYCH: Appropriate affect, tone, and pace of words        Video-Visit Details    Type of service:  Video Visit   Originating Location (pt. Location): Home    Distant Location (provider location):  On-site  Platform used for Video Visit: Devin  Signed Electronically by: Francisca Carty PA-C    "

## 2024-09-10 NOTE — PROGRESS NOTES
LVM for patient to call back to schedule the two week lab only appointment and the 1 month follow up with Provider.     Ne Grimaldo [facility]

## 2024-09-11 NOTE — TELEPHONE ENCOUNTER
Message #1 left to return call     My chart sent - review chart on 9/12/24 to see if patient read message - if so this can be closed     Tracy Altamirano, Registered Nurse  [facility]

## 2024-09-11 NOTE — TELEPHONE ENCOUNTER
PRIOR AUTHORIZATION DENIED    Medication: omeprazole (PRILOSEC) 20 MG DR capsule     Denial Date: 9/11/2024    Denial Rational:  Coverage is provided in situations where the patient is less then or equal to 12 years of age. Review and appeal are not available because of this exclusion.              Appeal Information: N/A

## 2024-09-11 NOTE — TELEPHONE ENCOUNTER
Please call patient and let him know he can buy the omeprazole over the counter. This medication is excluded from his insurance coverage.

## 2024-09-12 NOTE — TELEPHONE ENCOUNTER
Message #2 left to return call     Tracy Altamirano Registered Nurse  [facility]

## 2024-09-13 NOTE — TELEPHONE ENCOUNTER
Message #3 left to return call     Has not reviewed my chart message     Letter mailed     Tracy Altamirano Registered Nurse  [facility]

## 2024-09-17 ENCOUNTER — PATIENT OUTREACH (OUTPATIENT)
Dept: CARE COORDINATION | Facility: CLINIC | Age: 59
End: 2024-09-17
Payer: COMMERCIAL

## 2025-01-26 DIAGNOSIS — I10 ESSENTIAL HYPERTENSION: ICD-10-CM

## 2025-01-27 RX ORDER — SPIRONOLACTONE 25 MG/1
25 TABLET ORAL DAILY
Qty: 30 TABLET | Refills: 1 | OUTPATIENT
Start: 2025-01-27

## 2025-06-14 ENCOUNTER — HEALTH MAINTENANCE LETTER (OUTPATIENT)
Age: 60
End: 2025-06-14

## 2025-06-25 DIAGNOSIS — I10 ESSENTIAL HYPERTENSION: ICD-10-CM

## 2025-06-25 DIAGNOSIS — F51.01 PRIMARY INSOMNIA: ICD-10-CM

## 2025-06-25 RX ORDER — TRAZODONE HYDROCHLORIDE 100 MG/1
100 TABLET ORAL AT BEDTIME
Qty: 90 TABLET | Refills: 0 | Status: SHIPPED | OUTPATIENT
Start: 2025-06-25

## 2025-06-25 RX ORDER — LOSARTAN POTASSIUM 100 MG/1
100 TABLET ORAL DAILY
Qty: 30 TABLET | Refills: 0 | Status: SHIPPED | OUTPATIENT
Start: 2025-06-25

## 2025-06-25 RX ORDER — SPIRONOLACTONE 25 MG/1
25 TABLET ORAL DAILY
Qty: 30 TABLET | Refills: 0 | Status: SHIPPED | OUTPATIENT
Start: 2025-06-25

## 2025-06-25 NOTE — TELEPHONE ENCOUNTER
Contacts       Contact Date/Time Type Contact Phone/Fax    06/25/2025 01:59 PM CDT Phone (Incoming) Nabeel Severino (Self) [phone] (M)    06/25/2025 02:05 PM CDT Phone (Outgoing) Mere Nabeel CARMONA (Self) [phone] (M)    Left Message           Attempt #1 to reach patient to: Collect additional information. Outgoing call and [employer] message sent.     Information needed: Inquire if patient has been taking Spironolactone consistently every day. Has he missed any doses? Per refill team, patient should have run out of medication around 11/2024. Confirm he has been taking this medication as prescribed.     When patient returns call, please take this action: Transfer to site RN green line (or update telephone encounter in after-hours)    FRANCIA De Guzman [facility]

## 2025-06-25 NOTE — TELEPHONE ENCOUNTER
Patient called back. RN inquired regarding spironolactone. Patient states he was restarted on medication while he was in [facility]. He confirms 25mg dose daily.     Routing to provider to review and approve/deny as appropriate.        Kizzy BURTON RN on 6/25/2025 at 3:21 PM

## 2025-06-25 NOTE — TELEPHONE ENCOUNTER
Spironolactone: Clinic RN: Please investigate patient's chart or contact patient if the information cannot be found because patient should have run out of this medication on 11/2024. Confirm patient is taking this medication as prescribed. Document findings and route refill encounter to provider for approval or denial.    Joe Toure RN on 6/25/2025 at 2:04 PM

## 2025-07-02 ENCOUNTER — OFFICE VISIT (OUTPATIENT)
Dept: FAMILY MEDICINE | Facility: CLINIC | Age: 60
End: 2025-07-02
Payer: COMMERCIAL

## 2025-07-02 VITALS
TEMPERATURE: 98.8 F | HEART RATE: 105 BPM | DIASTOLIC BLOOD PRESSURE: 86 MMHG | OXYGEN SATURATION: 97 % | HEIGHT: 66 IN | WEIGHT: 225.5 LBS | SYSTOLIC BLOOD PRESSURE: 129 MMHG | RESPIRATION RATE: 16 BRPM | BODY MASS INDEX: 36.24 KG/M2

## 2025-07-02 DIAGNOSIS — F10.10 ALCOHOL ABUSE: ICD-10-CM

## 2025-07-02 DIAGNOSIS — F51.01 PRIMARY INSOMNIA: ICD-10-CM

## 2025-07-02 DIAGNOSIS — N18.2 CHRONIC KIDNEY DISEASE, STAGE 2 (MILD): ICD-10-CM

## 2025-07-02 DIAGNOSIS — F10.930 ALCOHOL WITHDRAWAL, UNCOMPLICATED (H): ICD-10-CM

## 2025-07-02 DIAGNOSIS — J30.1 SEASONAL ALLERGIC RHINITIS DUE TO POLLEN: ICD-10-CM

## 2025-07-02 DIAGNOSIS — E66.01 MORBID OBESITY (H): Primary | ICD-10-CM

## 2025-07-02 DIAGNOSIS — I10 BENIGN ESSENTIAL HYPERTENSION: ICD-10-CM

## 2025-07-02 DIAGNOSIS — L30.8 OTHER ECZEMA: ICD-10-CM

## 2025-07-02 DIAGNOSIS — F41.9 ANXIETY: ICD-10-CM

## 2025-07-02 DIAGNOSIS — K21.9 GASTROESOPHAGEAL REFLUX DISEASE WITHOUT ESOPHAGITIS: ICD-10-CM

## 2025-07-02 DIAGNOSIS — E06.3 HYPOTHYROIDISM DUE TO HASHIMOTO THYROIDITIS: ICD-10-CM

## 2025-07-02 DIAGNOSIS — I10 ESSENTIAL HYPERTENSION: ICD-10-CM

## 2025-07-02 DIAGNOSIS — E03.9 HYPOTHYROIDISM, UNSPECIFIED TYPE: ICD-10-CM

## 2025-07-02 PROBLEM — N17.9 AKI (ACUTE KIDNEY INJURY): Status: RESOLVED | Noted: 2021-12-14 | Resolved: 2025-07-02

## 2025-07-02 PROBLEM — F10.11 HISTORY OF ALCOHOL ABUSE: Status: RESOLVED | Noted: 2023-03-21 | Resolved: 2025-07-02

## 2025-07-02 PROBLEM — U07.1 PNEUMONIA DUE TO 2019 NOVEL CORONAVIRUS: Status: RESOLVED | Noted: 2021-12-14 | Resolved: 2025-07-02

## 2025-07-02 PROBLEM — J12.82 PNEUMONIA DUE TO 2019 NOVEL CORONAVIRUS: Status: RESOLVED | Noted: 2021-12-14 | Resolved: 2025-07-02

## 2025-07-02 PROCEDURE — 82248 BILIRUBIN DIRECT: CPT | Performed by: FAMILY MEDICINE

## 2025-07-02 PROCEDURE — 80061 LIPID PANEL: CPT | Performed by: FAMILY MEDICINE

## 2025-07-02 PROCEDURE — 3079F DIAST BP 80-89 MM HG: CPT | Performed by: FAMILY MEDICINE

## 2025-07-02 PROCEDURE — 36415 COLL VENOUS BLD VENIPUNCTURE: CPT | Performed by: FAMILY MEDICINE

## 2025-07-02 PROCEDURE — G2211 COMPLEX E/M VISIT ADD ON: HCPCS | Performed by: FAMILY MEDICINE

## 2025-07-02 PROCEDURE — 3074F SYST BP LT 130 MM HG: CPT | Performed by: FAMILY MEDICINE

## 2025-07-02 PROCEDURE — 99214 OFFICE O/P EST MOD 30 MIN: CPT | Performed by: FAMILY MEDICINE

## 2025-07-02 PROCEDURE — 80053 COMPREHEN METABOLIC PANEL: CPT | Performed by: FAMILY MEDICINE

## 2025-07-02 PROCEDURE — 84443 ASSAY THYROID STIM HORMONE: CPT | Performed by: FAMILY MEDICINE

## 2025-07-02 PROCEDURE — 82570 ASSAY OF URINE CREATININE: CPT | Performed by: FAMILY MEDICINE

## 2025-07-02 PROCEDURE — 1126F AMNT PAIN NOTED NONE PRSNT: CPT | Performed by: FAMILY MEDICINE

## 2025-07-02 PROCEDURE — 82043 UR ALBUMIN QUANTITATIVE: CPT | Performed by: FAMILY MEDICINE

## 2025-07-02 RX ORDER — LEVOTHYROXINE SODIUM 50 UG/1
50 TABLET ORAL DAILY
Qty: 90 TABLET | Refills: 2 | Status: SHIPPED | OUTPATIENT
Start: 2025-07-02

## 2025-07-02 RX ORDER — FAMOTIDINE 20 MG/1
1 TABLET, FILM COATED ORAL DAILY
COMMUNITY
Start: 2025-05-14 | End: 2025-07-02

## 2025-07-02 RX ORDER — AMLODIPINE BESYLATE 5 MG/1
1 TABLET ORAL DAILY
COMMUNITY
Start: 2025-05-19 | End: 2025-07-02

## 2025-07-02 RX ORDER — HYDROXYZINE HYDROCHLORIDE 25 MG/1
25 TABLET, FILM COATED ORAL 2 TIMES DAILY PRN
COMMUNITY
Start: 2025-06-02 | End: 2025-07-02

## 2025-07-02 RX ORDER — TRIAMCINOLONE ACETONIDE 1 MG/G
CREAM TOPICAL 2 TIMES DAILY
Qty: 80 G | Refills: 1 | Status: SHIPPED | OUTPATIENT
Start: 2025-07-02

## 2025-07-02 RX ORDER — OMEGA-3 FATTY ACIDS/FISH OIL 300-1000MG
CAPSULE ORAL
COMMUNITY
Start: 2025-05-03

## 2025-07-02 RX ORDER — TRAZODONE HYDROCHLORIDE 100 MG/1
100 TABLET ORAL AT BEDTIME
Qty: 90 TABLET | Refills: 0 | Status: SHIPPED | OUTPATIENT
Start: 2025-07-02

## 2025-07-02 RX ORDER — AMLODIPINE BESYLATE 5 MG/1
5 TABLET ORAL DAILY
Qty: 90 TABLET | Refills: 3 | Status: SHIPPED | OUTPATIENT
Start: 2025-07-02

## 2025-07-02 RX ORDER — ACETYLCYSTEINE 600 MG
CAPSULE ORAL
Status: CANCELLED | OUTPATIENT
Start: 2025-07-02

## 2025-07-02 RX ORDER — OMEPRAZOLE 20 MG/1
20 CAPSULE, DELAYED RELEASE ORAL DAILY
Qty: 90 CAPSULE | Refills: 3 | Status: SHIPPED | OUTPATIENT
Start: 2025-07-02

## 2025-07-02 RX ORDER — ACETYLCYSTEINE 600 MG
CAPSULE ORAL
COMMUNITY
Start: 2025-06-02

## 2025-07-02 RX ORDER — FLUTICASONE PROPIONATE 50 MCG
1 SPRAY, SUSPENSION (ML) NASAL DAILY
Qty: 16 G | Refills: 2 | Status: SHIPPED | OUTPATIENT
Start: 2025-07-02

## 2025-07-02 RX ORDER — LOSARTAN POTASSIUM 100 MG/1
100 TABLET ORAL DAILY
Qty: 90 TABLET | Refills: 3 | Status: SHIPPED | OUTPATIENT
Start: 2025-07-02

## 2025-07-02 RX ORDER — HYDROXYZINE HYDROCHLORIDE 25 MG/1
25 TABLET, FILM COATED ORAL 2 TIMES DAILY PRN
Qty: 60 TABLET | Refills: 3 | Status: SHIPPED | OUTPATIENT
Start: 2025-07-02

## 2025-07-02 RX ORDER — SPIRONOLACTONE 25 MG/1
25 TABLET ORAL DAILY
Qty: 90 TABLET | Refills: 3 | Status: SHIPPED | OUTPATIENT
Start: 2025-07-02

## 2025-07-02 RX ORDER — KETOTIFEN FUMARATE 0.35 MG/ML
1 SOLUTION/ DROPS OPHTHALMIC 2 TIMES DAILY
COMMUNITY

## 2025-07-02 ASSESSMENT — PATIENT HEALTH QUESTIONNAIRE - PHQ9
SUM OF ALL RESPONSES TO PHQ QUESTIONS 1-9: 2
10. IF YOU CHECKED OFF ANY PROBLEMS, HOW DIFFICULT HAVE THESE PROBLEMS MADE IT FOR YOU TO DO YOUR WORK, TAKE CARE OF THINGS AT HOME, OR GET ALONG WITH OTHER PEOPLE: NOT DIFFICULT AT ALL
SUM OF ALL RESPONSES TO PHQ QUESTIONS 1-9: 2

## 2025-07-02 ASSESSMENT — PAIN SCALES - GENERAL: PAINLEVEL_OUTOF10: NO PAIN (0)

## 2025-07-02 NOTE — ASSESSMENT & PLAN NOTE
S/p [facility] inpatient treatment for 1 month, he is doing intensive outpatient 4 nights a week,  seeing addiction therapist regularly.

## 2025-07-02 NOTE — PROGRESS NOTES
Assessment & Plan     Alcohol withdrawal:  - Recent relapse with alcohol, completed a 28-day inpatient program at [facility], currently in intensive outpatient therapy. Experiencing post-acute withdrawal syndrome with sleep disturbances.  - Continue intensive outpatient therapy four nights a week and weekly sessions with an addiction therapist. Monitor liver enzymes. Prescribed hydroxyzine for anxiety and trazodone for sleep disturbances.    Essential hypertension:  - Blood pressure was high upon admission to [facility] but improved with medication and lifestyle changes.  - Continue current medications: amlodipine 5 mg, spironolactone 25 mg, and losartan 100 mg. Follow-up in one month to monitor blood pressure.    Anxiety:  - Anxiety managed with hydroxyzine, but causes drowsiness during the day.  - Continue hydroxyzine at night. Consider alternative anxiety management if daytime drowsiness persists.    Hypothyroidism:  - Long-standing hypothyroidism managed with levothyroxine 50 mcg.  - Continue current levothyroxine dosage.    Gastroesophageal reflux disease:  - Previously on omeprazole, switched to famotidine due to insurance issues.  - Attempt to re-prescribe omeprazole if insurance allows. Continue over-the-counter famotidine if necessary.    Seasonal allergic rhinitis:  - Managed with Flonase, especially during allergy season.  - Continue using Flonase. Consider trying Claritin over-the-counter for additional relief.    Chronic phlegm:  - Persistent phlegm possibly related to past COVID pneumonia and allergies.  - Continue using Mucinex as needed. Consider trying Claritin for potential allergy-related symptoms.    Morbid obesity:  Today I counseled the patient about diet, regular exercise and weight loss planning.    Consent was obtained from the patient to use an AI documentation tool in the creation of this note.          BMI  Estimated body mass index is 35.94 kg/m  as calculated from the following:    Height  "as of this encounter: 1.687 m (5' 6.42\").    Weight as of this encounter: 102.3 kg (225 lb 8 oz).   Weight management plan: Discussed healthy diet and exercise guidelines          Subjective   Nabeel is a 59 year old, presenting for the following health issues:  Medications and Referral (Referral for Counseling )      7/2/2025     3:58 PM   Additional Questions   Roomed by mf   Accompanied by Self         7/2/2025   Forms   Any forms needing to be completed Yes         7/2/2025     3:58 PM   Patient Reported Additional Medications   Patient reports taking the following new medications see medication forms (paper)     History of Present Illness       Hypertension: He presents for follow up of hypertension.  He does not check blood pressure  regularly outside of the clinic. Outpatient blood pressures have not been over 140/90. He does not follow a low salt diet.     Hypothyroidism:     Since last visit, patient describes the following symptoms::  Anxiety and Fatigue    He eats 0-1 servings of fruits and vegetables daily.He consumes 1 sweetened beverage(s) daily.He exercises with enough effort to increase his heart rate 10 to 19 minutes per day.  He exercises with enough effort to increase his heart rate 3 or less days per week.   He is taking medications regularly.          History of Present Illness-  - Name: Nabeel Severino, age: 59 years, gender: male  - Relapsed into alcohol use, spent time in [facility] [facility] for treatment from mid-April to mid-May 2025. Previously relapsed in 2023 after doing well since 2020. 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 Drinking alone, consuming up to 175 bottles of Grey Goose daily. Blood pressure was extremely high upon admission to [facility], spent 2.5 days in detox. Blood pressure improved after two weeks of treatment, including dietary changes and medication. Currently on amlodipine 5 mg, " "spironolactone, and losartan 100 mg for blood pressure management. Hydroxyzine prescribed for anxiety, taken at night due to daytime drowsiness. Trazodone used for sleep, experiencing post-acute withdrawal syndrome with nightmares and sleep disturbances. Liver enzymes returned to normal after treatment at [place]. Using Flonase for nasal allergies, especially during allergy season. On levothyroxine 50 mcg for hypothyroidism, long-term use. Famotidine used for stomach issues due to insurance denial of omeprazole.  mg prescribed for phlegm, but Mucinex is more effective. Heart rate consistently high, attributed to anxiety and type A personality. History of severe reaction to tetanus shot in childhood, advised against further tetanus vaccinations.          Review of Systems  Constitutional, HEENT, cardiovascular, pulmonary, gi and gu systems are negative, except as otherwise noted.      Objective    /86   Pulse (!) 125   Temp 98.8  F (37.1  C)   Resp 16   Ht 1.687 m (5' 6.42\")   Wt 102.3 kg (225 lb 8 oz)   SpO2 95%   BMI 35.94 kg/m    Body mass index is 35.94 kg/m .  Physical Exam   GENERAL: alert and no distress  NECK: no adenopathy, no asymmetry, masses, or scars  RESP: lungs clear to auscultation - no rales, rhonchi or wheezes  CV: regular rate and rhythm, normal S1 S2, no S3 or S4, no murmur, click or rub, no peripheral edema  ABDOMEN: soft, nontender, no hepatosplenomegaly, no masses and bowel sounds normal  MS: no gross musculoskeletal defects noted, no edema            Signed Electronically by: Yasmin Rodríguez MD    "

## 2025-07-03 ENCOUNTER — RESULTS FOLLOW-UP (OUTPATIENT)
Dept: FAMILY MEDICINE | Facility: CLINIC | Age: 60
End: 2025-07-03

## 2025-07-03 DIAGNOSIS — N18.2 CHRONIC KIDNEY DISEASE, STAGE 2 (MILD): Primary | ICD-10-CM

## 2025-07-03 LAB
ALBUMIN SERPL BCG-MCNC: 4.6 G/DL (ref 3.5–5.2)
ALP SERPL-CCNC: 79 U/L (ref 40–150)
ALT SERPL W P-5'-P-CCNC: 25 U/L (ref 0–70)
ANION GAP SERPL CALCULATED.3IONS-SCNC: 12 MMOL/L (ref 7–15)
AST SERPL W P-5'-P-CCNC: 30 U/L (ref 0–45)
BILIRUB SERPL-MCNC: 0.3 MG/DL
BILIRUBIN DIRECT (ROCHE PRO & PURE): 0.12 MG/DL (ref 0–0.45)
BUN SERPL-MCNC: 18.8 MG/DL (ref 8–23)
CALCIUM SERPL-MCNC: 10.7 MG/DL (ref 8.8–10.4)
CHLORIDE SERPL-SCNC: 104 MMOL/L (ref 98–107)
CHOLEST SERPL-MCNC: 179 MG/DL
CREAT SERPL-MCNC: 1.51 MG/DL (ref 0.67–1.17)
CREAT UR-MCNC: 103 MG/DL
EGFRCR SERPLBLD CKD-EPI 2021: 52 ML/MIN/1.73M2
FASTING STATUS PATIENT QL REPORTED: NO
FASTING STATUS PATIENT QL REPORTED: NO
GLUCOSE SERPL-MCNC: 109 MG/DL (ref 70–99)
HCO3 SERPL-SCNC: 24 MMOL/L (ref 22–29)
HDLC SERPL-MCNC: 38 MG/DL
LDLC SERPL CALC-MCNC: 96 MG/DL
MICROALBUMIN UR-MCNC: <12 MG/L
MICROALBUMIN/CREAT UR: NORMAL MG/G{CREAT}
NONHDLC SERPL-MCNC: 141 MG/DL
POTASSIUM SERPL-SCNC: 4.5 MMOL/L (ref 3.4–5.3)
PROT SERPL-MCNC: 7.7 G/DL (ref 6.4–8.3)
SODIUM SERPL-SCNC: 140 MMOL/L (ref 135–145)
TRIGL SERPL-MCNC: 227 MG/DL
TSH SERPL DL<=0.005 MIU/L-ACNC: 1.94 UIU/ML (ref 0.3–4.2)

## 2025-07-07 ENCOUNTER — TELEPHONE (OUTPATIENT)
Dept: FAMILY MEDICINE | Facility: CLINIC | Age: 60
End: 2025-07-07
Payer: COMMERCIAL

## 2025-07-07 NOTE — TELEPHONE ENCOUNTER
Prior Authorization Retail Medication Request    Medication/Dose: omeprazole (PRILOSEC) 20 MG DR capsule   Diagnosis and ICD code (if different than what is on RX):    New/renewal/insurance change PA/secondary ins. PA:  Previously Tried and Failed:    Rationale:      Moya: [id number]  Yara Orr TC

## 2025-07-09 NOTE — TELEPHONE ENCOUNTER
Pt returning call,     Relayed message below regarding exclusion from plan regarding omeprazole.     Pt verbalizes understanding and is agreeable with plan. Pt denies any further questions or concerns at this time.     Debby ETIENNE RN   Clinic RN  [facility]

## 2025-07-09 NOTE — TELEPHONE ENCOUNTER
Retail Pharmacy Prior Authorization Team   Phone: [phone]    PA Initiation    Medication: OMEPRAZOLE 20 MG PO CPDR  Insurance Company: [employer]) - Phone [phone] Fax [phone]  Pharmacy Filling the Rx: [id number] - [place], [place] - [address]  [address]  Filling Pharmacy Phone: [phone]  Filling Pharmacy Fax:    Start Date: 7/9/2025    Started PA on CMM and a response of Product or Service is not covered for patient age.  Per insurance, the medication is covered for patient's age 12 and under.  Patient's over the age of 13, the medication is considered a plan exclusion and there is not an option to complete a PA.

## 2025-07-09 NOTE — TELEPHONE ENCOUNTER
[id number] [facility] RN attempt #1, message left to return call     Attempted to reach patient to: Relay a message    Regarding: omeprazole     Medication is excluded from insurance plan - they will not cover and we can not appeal   Advised patient he can purchase over the counter if he would like to continue taking   Good Rx coupon may be helpful to reduce cost     Action to take: Transfer to site RN green line (or update telephone encounter in after-hours)    Tracy Altamirano Registered Nurse  [facility]

## 2025-08-06 ENCOUNTER — OFFICE VISIT (OUTPATIENT)
Dept: FAMILY MEDICINE | Facility: CLINIC | Age: 60
End: 2025-08-06
Payer: COMMERCIAL

## 2025-08-06 VITALS
SYSTOLIC BLOOD PRESSURE: 133 MMHG | BODY MASS INDEX: 36.87 KG/M2 | WEIGHT: 229.4 LBS | TEMPERATURE: 98 F | HEART RATE: 91 BPM | RESPIRATION RATE: 16 BRPM | DIASTOLIC BLOOD PRESSURE: 91 MMHG | OXYGEN SATURATION: 97 % | HEIGHT: 66 IN

## 2025-08-06 DIAGNOSIS — E66.01 MORBID OBESITY (H): ICD-10-CM

## 2025-08-06 DIAGNOSIS — E78.5 HYPERLIPIDEMIA LDL GOAL <100: ICD-10-CM

## 2025-08-06 DIAGNOSIS — Z00.00 ROUTINE GENERAL MEDICAL EXAMINATION AT A HEALTH CARE FACILITY: Primary | ICD-10-CM

## 2025-08-06 DIAGNOSIS — F10.10 ALCOHOL ABUSE: ICD-10-CM

## 2025-08-06 DIAGNOSIS — F41.9 ANXIETY: ICD-10-CM

## 2025-08-06 DIAGNOSIS — I10 BENIGN ESSENTIAL HYPERTENSION: ICD-10-CM

## 2025-08-06 PROBLEM — K63.5 POLYP OF COLON: Status: ACTIVE | Noted: 2024-04-08

## 2025-08-06 PROCEDURE — G2211 COMPLEX E/M VISIT ADD ON: HCPCS | Performed by: FAMILY MEDICINE

## 2025-08-06 PROCEDURE — 3080F DIAST BP >= 90 MM HG: CPT | Performed by: FAMILY MEDICINE

## 2025-08-06 PROCEDURE — 99396 PREV VISIT EST AGE 40-64: CPT | Performed by: FAMILY MEDICINE

## 2025-08-06 PROCEDURE — 99214 OFFICE O/P EST MOD 30 MIN: CPT | Mod: 25 | Performed by: FAMILY MEDICINE

## 2025-08-06 PROCEDURE — 3075F SYST BP GE 130 - 139MM HG: CPT | Performed by: FAMILY MEDICINE

## 2025-08-06 RX ORDER — ATORVASTATIN CALCIUM 10 MG/1
10 TABLET, FILM COATED ORAL DAILY
Qty: 90 TABLET | Refills: 3 | Status: SHIPPED | OUTPATIENT
Start: 2025-08-06

## 2025-08-06 SDOH — HEALTH STABILITY: PHYSICAL HEALTH: ON AVERAGE, HOW MANY DAYS PER WEEK DO YOU ENGAGE IN MODERATE TO STRENUOUS EXERCISE (LIKE A BRISK WALK)?: 3 DAYS

## 2025-08-06 ASSESSMENT — SOCIAL DETERMINANTS OF HEALTH (SDOH): HOW OFTEN DO YOU GET TOGETHER WITH FRIENDS OR RELATIVES?: TWICE A WEEK
